# Patient Record
Sex: MALE | Race: WHITE | NOT HISPANIC OR LATINO | Employment: OTHER | URBAN - METROPOLITAN AREA
[De-identification: names, ages, dates, MRNs, and addresses within clinical notes are randomized per-mention and may not be internally consistent; named-entity substitution may affect disease eponyms.]

---

## 2017-09-20 PROBLEM — M77.32 CALCANEAL SPUR OF LEFT FOOT: Status: ACTIVE | Noted: 2017-09-20

## 2017-10-03 ENCOUNTER — OFFICE VISIT (OUTPATIENT)
Dept: UROLOGY | Facility: CLINIC | Age: 52
End: 2017-10-03
Payer: MEDICAID

## 2017-10-03 VITALS — HEIGHT: 66 IN | WEIGHT: 153 LBS | BODY MASS INDEX: 24.59 KG/M2

## 2017-10-03 DIAGNOSIS — N43.3 HYDROCELE, ACQUIRED: Primary | ICD-10-CM

## 2017-10-03 DIAGNOSIS — N50.819 ORCHALGIA: ICD-10-CM

## 2017-10-03 PROCEDURE — 99999 PR PBB SHADOW E&M-EST. PATIENT-LVL III: CPT | Mod: PBBFAC,,, | Performed by: UROLOGY

## 2017-10-03 PROCEDURE — 99213 OFFICE O/P EST LOW 20 MIN: CPT | Mod: PBBFAC,PN | Performed by: UROLOGY

## 2017-10-03 PROCEDURE — 99205 OFFICE O/P NEW HI 60 MIN: CPT | Mod: S$PBB,,, | Performed by: UROLOGY

## 2017-10-03 RX ORDER — LIDOCAINE HYDROCHLORIDE AND EPINEPHRINE 20; 10 MG/ML; UG/ML
1 INJECTION, SOLUTION INFILTRATION; PERINEURAL ONCE
Status: CANCELLED | OUTPATIENT
Start: 2017-10-03 | End: 2017-10-03

## 2017-10-03 RX ORDER — SODIUM CHLORIDE 9 MG/ML
INJECTION, SOLUTION INTRAVENOUS CONTINUOUS
Status: CANCELLED | OUTPATIENT
Start: 2017-10-03

## 2017-10-03 RX ORDER — CLONIDINE HYDROCHLORIDE 0.1 MG/1
0.1 TABLET ORAL DAILY
COMMUNITY
Start: 2017-09-07 | End: 2018-01-05

## 2017-10-03 RX ORDER — METFORMIN HYDROCHLORIDE 1000 MG/1
1000 TABLET ORAL 2 TIMES DAILY WITH MEALS
Status: ON HOLD | COMMUNITY
Start: 2017-09-19 | End: 2022-12-28 | Stop reason: CLARIF

## 2017-10-03 RX ORDER — BUPROPION HYDROCHLORIDE 150 MG/1
150 TABLET ORAL DAILY
COMMUNITY
Start: 2017-09-07 | End: 2018-01-05

## 2017-10-03 RX ORDER — INSULIN GLARGINE 100 [IU]/ML
22 INJECTION, SOLUTION SUBCUTANEOUS DAILY
COMMUNITY
Start: 2017-09-23 | End: 2022-01-12 | Stop reason: ALTCHOICE

## 2017-10-03 RX ORDER — CIPROFLOXACIN 2 MG/ML
400 INJECTION, SOLUTION INTRAVENOUS
Status: CANCELLED | OUTPATIENT
Start: 2017-10-03

## 2017-10-03 NOTE — PROGRESS NOTES
Subjective:       Patient ID: Ross Crystal is a 52 y.o. male.    Chief Complaint: Other (hydrocele)    51 yo WM with history  of right orchalgia x 4 weeks.. Here for treatment.      Testicle Pain   The patient's primary symptoms include testicular pain. The patient's pertinent negatives include no penile discharge, penile pain or scrotal swelling. This is a new problem. The current episode started 1 to 4 weeks ago. The problem occurs constantly. The problem has been gradually worsening. The pain is medium. Associated symptoms include frequency. Pertinent negatives include no abdominal pain, anorexia, chest pain, chills, constipation, coughing, diarrhea, discolored urine, dysuria, fever, flank pain, headaches, hematuria, hesitancy, joint pain, joint swelling, nausea, painful intercourse, rash, shortness of breath, sore throat, urgency, urinary retention or vomiting.     Review of Systems   Constitutional: Negative for activity change, appetite change, chills, diaphoresis, fatigue, fever and unexpected weight change.   HENT: Negative for congestion, hearing loss, sinus pressure, sore throat and trouble swallowing.    Eyes: Negative for photophobia, pain, discharge and visual disturbance.   Respiratory: Negative for apnea, cough and shortness of breath.    Cardiovascular: Negative for chest pain, palpitations and leg swelling.   Gastrointestinal: Negative for abdominal distention, abdominal pain, anal bleeding, anorexia, blood in stool, constipation, diarrhea, nausea, rectal pain and vomiting.   Endocrine: Negative for cold intolerance, heat intolerance, polydipsia, polyphagia and polyuria.   Genitourinary: Positive for frequency and testicular pain. Negative for decreased urine volume, difficulty urinating, discharge, dysuria, enuresis, flank pain, genital sores, hematuria, hesitancy, penile pain, penile swelling, scrotal swelling and urgency.   Musculoskeletal: Negative for arthralgias, back pain, joint pain and  myalgias.   Skin: Negative for color change, pallor, rash and wound.   Allergic/Immunologic: Negative for environmental allergies, food allergies and immunocompromised state.   Neurological: Negative for dizziness, seizures, weakness and headaches.   Hematological: Negative for adenopathy. Does not bruise/bleed easily.   Psychiatric/Behavioral: Negative.        Objective:      Physical Exam   Nursing note and vitals reviewed.  Constitutional: He is oriented to person, place, and time. He appears well-developed and well-nourished.   HENT:   Head: Normocephalic.   Nose: Nose normal.   Mouth/Throat: Oropharynx is clear and moist.   Eyes: Conjunctivae and EOM are normal. Pupils are equal, round, and reactive to light.   Neck: Normal range of motion. Neck supple.   Cardiovascular: Normal rate, regular rhythm, normal heart sounds and intact distal pulses.    Pulmonary/Chest: Effort normal and breath sounds normal.   Abdominal: Soft. Bowel sounds are normal.   Genitourinary: Penis normal. Right testis shows swelling. Right testis shows no mass and no tenderness. Right testis is descended. Cremasteric reflex is not absent on the right side. Left testis shows no mass, no swelling and no tenderness. Left testis is descended. Cremasteric reflex is not absent on the left side. Circumcised.         Musculoskeletal: Normal range of motion.   Neurological: He is alert and oriented to person, place, and time. He has normal reflexes.   Skin: Skin is warm and dry.     Psychiatric: He has a normal mood and affect. His behavior is normal. Judgment and thought content normal.       Assessment:       1. Hydrocele, acquired    2. Orchalgia        Plan:       Patient Instructions   Right Hydrocelectomy 10/9/17

## 2017-10-03 NOTE — LETTER
October 3, 2017      Ross Valverde MD  [address]  [address] [facility] [place] [address]           [facility]  [id number] [place] [address] [address]  [place] [address]  Phone: [phone]  Fax: [phone]          Patient: Ross Crystal   MR Number: [id number]   YOB: 1965   Date of Visit: 10/3/2017       Dear Dr. Ross Valverde:    Thank you for referring Ross Crystal to me for evaluation. Attached you will find relevant portions of my assessment and plan of care.    If you have questions, please do not hesitate to call me. I look forward to following Ross Crystal along with you.    Sincerely,    Zelalem Osman MD    Enclosure  CC:  No Recipients    If you would like to receive this communication electronically, please contact [email or web address] or [phone] to request more information on [facility] Link access.    For providers and/or their staff who would like to refer a patient to [facility], please contact us through our one-stop-shop provider referral line, [facility], at [phone].    If you feel you have received this communication in error or would no longer like to receive these types of communications, please e-mail [email or web address]

## 2017-10-04 ENCOUNTER — TELEPHONE (OUTPATIENT)
Dept: UROLOGY | Facility: CLINIC | Age: 52
End: 2017-10-04

## 2017-10-04 NOTE — TELEPHONE ENCOUNTER
----- Message from Yesenia Chamberlain sent at 10/4/2017 11:33 AM CDT -----  Contact: Daughter Leanne/[phone]  Patient's daughter requesting to speak with you concerning patient's upcoming surgery. Please call and advise.

## 2017-10-04 NOTE — TELEPHONE ENCOUNTER
----- Message from Yesenia Chamberlain sent at 10/4/2017  1:28 PM CDT -----  Contact: [phone]/Daughter Leanne  Patient's daughter states she is returning your call. Please advise.

## 2017-10-12 ENCOUNTER — TELEPHONE (OUTPATIENT)
Dept: UROLOGY | Facility: CLINIC | Age: 52
End: 2017-10-12

## 2017-10-12 NOTE — TELEPHONE ENCOUNTER
----- Message from Zelalem Osman MD sent at 10/12/2017  8:31 AM CDT -----  Tissue is a benign hydrocele

## 2017-11-01 ENCOUNTER — OFFICE VISIT (OUTPATIENT)
Dept: UROLOGY | Facility: CLINIC | Age: 52
End: 2017-11-01
Payer: MEDICAID

## 2017-11-01 VITALS — WEIGHT: 156 LBS | HEIGHT: 66 IN | BODY MASS INDEX: 25.07 KG/M2

## 2017-11-01 DIAGNOSIS — Z98.890 POSTOPERATIVE STATE: ICD-10-CM

## 2017-11-01 DIAGNOSIS — N43.3 HYDROCELE, ACQUIRED: Primary | ICD-10-CM

## 2017-11-01 DIAGNOSIS — N50.819 ORCHALGIA: ICD-10-CM

## 2017-11-01 PROCEDURE — 99212 OFFICE O/P EST SF 10 MIN: CPT | Mod: PBBFAC,PN | Performed by: UROLOGY

## 2017-11-01 PROCEDURE — 99999 PR PBB SHADOW E&M-EST. PATIENT-LVL II: CPT | Mod: PBBFAC,,, | Performed by: UROLOGY

## 2017-11-01 PROCEDURE — 99024 POSTOP FOLLOW-UP VISIT: CPT | Mod: ,,, | Performed by: UROLOGY

## 2017-11-01 RX ORDER — GABAPENTIN 300 MG/1
CAPSULE ORAL
COMMUNITY
Start: 2017-10-23 | End: 2018-02-22

## 2017-11-01 RX ORDER — SYRINGE,SAFETY WITH NEEDLE,1ML 25GX1"
SYRINGE (EA) MISCELLANEOUS
COMMUNITY
Start: 2017-10-31

## 2017-11-01 NOTE — PROGRESS NOTES
"Ross Crystal is a 52 y.o. male patient.   1. Hydrocele, acquired    2. Orchalgia    3. Postoperative state      Past Medical History:   Diagnosis Date    Diabetes mellitus     Diabetes mellitus type I     Hyperlipemia     Renal disorder     Urinary tract infection      Past Surgical History Pertinent Negatives:   Procedure Date Noted    CYSTOSCOPY 10/03/2017    PROSTATE SURGERY 10/03/2017    VASECTOMY 10/03/2017     Scheduled Meds:  Continuous Infusions:  PRN Meds:    Review of patient's allergies indicates:   Allergen Reactions    Bactrim [sulfamethoxazole-trimethoprim] Rash     There are no hospital problems to display for this patient.    Height 5' 6" (1.676 m), weight 70.8 kg (156 lb).    Subjective:   Diet: Adequate intake.  Patient reports no nausea or vomiting.    Activity level: Returning to normal.    Pain control: Well controlled.      Objective:  Vital signs (most recent): Height 5' 6" (1.676 m), weight 70.8 kg (156 lb).  General appearance: Comfortable, well-appearing, in no acute distress and not in pain.    Lungs:  Normal respiratory rate and normal effort.  Breath sounds normal.    Heart: Normal rate.  Regular rhythm.  S1 normal.    Chest: Symmetric chest wall expansion.    Abdomen: Abdomen is soft.    Bowel sounds:  Bowel sounds are normal.    Tenderness: There is no abdominal tenderness tenderness.    Wound:  Clean (Still with some edema to cord).  There is no drainage.    Extremities: There is normal range of motion.    Neurological: The patient is alert and oriented to person, place and time.  Right pupil is reactive.       Assessment & Plan       Zelalem Osman MD  11/1/2017  "

## 2018-04-10 ENCOUNTER — OFFICE VISIT (OUTPATIENT)
Dept: UROLOGY | Facility: CLINIC | Age: 53
End: 2018-04-10
Payer: MEDICAID

## 2018-04-10 VITALS
BODY MASS INDEX: 25.51 KG/M2 | WEIGHT: 158.75 LBS | OXYGEN SATURATION: 98 % | RESPIRATION RATE: 17 BRPM | DIASTOLIC BLOOD PRESSURE: 78 MMHG | HEART RATE: 76 BPM | HEIGHT: 66 IN | SYSTOLIC BLOOD PRESSURE: 110 MMHG

## 2018-04-10 DIAGNOSIS — R30.0 DYSURIA: ICD-10-CM

## 2018-04-10 DIAGNOSIS — M54.5 BILATERAL LOW BACK PAIN, UNSPECIFIED CHRONICITY, WITH SCIATICA PRESENCE UNSPECIFIED: ICD-10-CM

## 2018-04-10 DIAGNOSIS — R10.30 LOWER ABDOMINAL PAIN: ICD-10-CM

## 2018-04-10 DIAGNOSIS — N50.819 ORCHALGIA: Primary | ICD-10-CM

## 2018-04-10 PROCEDURE — 99999 PR PBB SHADOW E&M-EST. PATIENT-LVL V: CPT | Mod: PBBFAC,,, | Performed by: NURSE PRACTITIONER

## 2018-04-10 PROCEDURE — 87086 URINE CULTURE/COLONY COUNT: CPT

## 2018-04-10 PROCEDURE — 99214 OFFICE O/P EST MOD 30 MIN: CPT | Mod: S$PBB,,, | Performed by: NURSE PRACTITIONER

## 2018-04-10 PROCEDURE — 99215 OFFICE O/P EST HI 40 MIN: CPT | Mod: PBBFAC,PO | Performed by: NURSE PRACTITIONER

## 2018-04-10 RX ORDER — CALCIUM CITRATE/VITAMIN D3 200MG-6.25
TABLET ORAL
COMMUNITY
Start: 2018-03-16 | End: 2022-12-28

## 2018-04-10 RX ORDER — BUPROPION HYDROCHLORIDE 150 MG/1
TABLET ORAL
COMMUNITY
Start: 2018-02-28 | End: 2022-01-12 | Stop reason: ALTCHOICE

## 2018-04-10 RX ORDER — PRAVASTATIN SODIUM 40 MG/1
40 TABLET ORAL NIGHTLY
COMMUNITY
Start: 2018-03-21

## 2018-04-10 RX ORDER — IBUPROFEN 800 MG/1
800 TABLET ORAL 3 TIMES DAILY
Qty: 42 TABLET | Refills: 0 | Status: SHIPPED | OUTPATIENT
Start: 2018-04-10 | End: 2018-04-24

## 2018-04-10 RX ORDER — GLIPIZIDE 10 MG/1
TABLET ORAL
COMMUNITY
Start: 2018-03-21 | End: 2022-01-12 | Stop reason: ALTCHOICE

## 2018-04-10 NOTE — PROGRESS NOTES
Subjective:       Patient ID: Ross Crystal is a 52 y.o. male.    Chief Complaint: Testicle Pain    Patient is a 51 yo WM who is here today with c/o right orchalgia, lower abdominal pain, and low back pain. Patient had a hydrocelectomy (right scrotum) on 10/9/2017.       Testicle Pain   The patient's primary symptoms include scrotal swelling and testicular pain. The patient's pertinent negatives include no penile discharge or penile pain. This is a recurrent problem. Episode onset: 2 weeks ago after fishing. The problem occurs constantly. The problem has been unchanged. The pain is medium (7/10 pain with numbness). Associated symptoms include abdominal pain, dysuria and nausea. Pertinent negatives include no chest pain, chills, constipation, coughing, diarrhea, discolored urine, fever, flank pain, frequency, headaches, hematuria, hesitancy, shortness of breath, urgency, urinary retention or vomiting. The testicular pain affects the right testicle. There is swelling in the right testicle. The color of the testicles is blue. The symptoms are aggravated by heavy lifting. He has tried prescription analgesics for the symptoms. The treatment provided mild relief. He is not sexually active. There is no history of kidney stones, prostatitis or varicocele.     Review of Systems   Constitutional: Negative for chills and fever.   Respiratory: Negative for cough and shortness of breath.    Cardiovascular: Negative for chest pain.   Gastrointestinal: Positive for abdominal pain and nausea. Negative for constipation, diarrhea and vomiting.   Genitourinary: Positive for dysuria, scrotal swelling and testicular pain. Negative for decreased urine volume, difficulty urinating, discharge, flank pain, frequency, hematuria, hesitancy, penile pain, penile swelling and urgency.   Musculoskeletal: Positive for back pain.   Neurological: Negative for dizziness, weakness, light-headedness and headaches.   Psychiatric/Behavioral: Negative.         Objective:      Physical Exam   Constitutional: He is oriented to person, place, and time. He appears well-developed and well-nourished. No distress.   HENT:   Head: Normocephalic and atraumatic.   Eyes: EOM are normal. Pupils are equal, round, and reactive to light.   Neck: Normal range of motion.   Cardiovascular: Normal rate.    Pulmonary/Chest: Effort normal. No respiratory distress.   Abdominal: Soft. There is no tenderness.       Abdominal pain and pressure.   Genitourinary: Right testis shows swelling (mild) and tenderness. Right testis shows no mass. Left testis shows no mass, no swelling and no tenderness.   Genitourinary Comments: No abnormal skin color.   Musculoskeletal:        Back:    Low back pain.  No CVA tenderness.   Neurological: He is alert and oriented to person, place, and time. Coordination normal.   Skin: Skin is warm and dry.   Psychiatric: He has a normal mood and affect. His behavior is normal. Judgment and thought content normal.   Nursing note and vitals reviewed.      Assessment:       1. Orchalgia    2. Dysuria    3. Lower abdominal pain    4. Bilateral low back pain, unspecified chronicity, with sciatica presence unspecified        Plan:       Ross was seen today for testicle pain.    Diagnoses and all orders for this visit:    Orchalgia  -     ibuprofen (ADVIL,MOTRIN) 800 MG tablet; Take 1 tablet (800 mg total) by mouth 3 (three) times daily.  -     US Scrotum And Testicles; Future        -     Scrotal support    Dysuria  -     POCT URINE DIPSTICK WITHOUT MICROSCOPE  -     Urine culture    Lower abdominal pain  -     CT Renal Stone Study ABD Pelvis WO; Future    Bilateral low back pain, unspecified chronicity, with sciatica presence unspecified  -     CT Renal Stone Study ABD Pelvis WO; Future    Follow-up in 1 week    Y'Sabrina Guthrie NP

## 2018-04-10 NOTE — PATIENT INSTRUCTIONS
U/A  Urine culture  US scrotum and testicles  CT renal stone study  Start Ibuprofen 800 mg by mouth  Scrotal support  Follow-up in 1 week

## 2018-04-12 LAB — BACTERIA UR CULT: NO GROWTH

## 2018-04-18 ENCOUNTER — OFFICE VISIT (OUTPATIENT)
Dept: UROLOGY | Facility: CLINIC | Age: 53
End: 2018-04-18
Payer: MEDICAID

## 2018-04-18 VITALS — DIASTOLIC BLOOD PRESSURE: 67 MMHG | SYSTOLIC BLOOD PRESSURE: 98 MMHG | HEART RATE: 79 BPM

## 2018-04-18 DIAGNOSIS — N45.1 EPIDIDYMITIS, RIGHT: Primary | ICD-10-CM

## 2018-04-18 DIAGNOSIS — N50.819 ORCHALGIA: ICD-10-CM

## 2018-04-18 PROCEDURE — 99213 OFFICE O/P EST LOW 20 MIN: CPT | Mod: PBBFAC,PO | Performed by: NURSE PRACTITIONER

## 2018-04-18 PROCEDURE — 99999 PR PBB SHADOW E&M-EST. PATIENT-LVL III: CPT | Mod: PBBFAC,,, | Performed by: NURSE PRACTITIONER

## 2018-04-18 PROCEDURE — 99214 OFFICE O/P EST MOD 30 MIN: CPT | Mod: S$PBB,,, | Performed by: NURSE PRACTITIONER

## 2018-04-18 RX ORDER — CIPROFLOXACIN 500 MG/1
500 TABLET ORAL EVERY 12 HOURS
Qty: 28 TABLET | Refills: 0 | Status: SHIPPED | OUTPATIENT
Start: 2018-04-18 | End: 2018-05-02

## 2018-04-18 NOTE — PROGRESS NOTES
Subjective:       Patient ID: Ross Crystal is a 52 y.o. male.    Chief Complaint: No chief complaint on file.    Patient is a 52 WM who is here today for results and c/o right orchalgia. Patient was seen by me for same problem on 4/10/2018. CT renal stone study and US of scrotum and testicles were ordered at that time. CT showed no acute findings and US of scrotum and testicles showed right epididymal cyst vs spermatocele. Patient currently taking ibuprofen 800 mg for pain.       Other   This is a new (right orchalgia) problem. Episode onset: Approximately 4 weeks ago. The problem occurs intermittently. The problem has been waxing and waning. Associated symptoms include abdominal pain (pressure). Pertinent negatives include no change in bowel habit, chest pain, chills, coughing, fatigue, fever, headaches, myalgias, nausea, numbness, urinary symptoms, vertigo, visual change, vomiting or weakness. Exacerbated by: sitting. He has tried NSAIDs and oral narcotics for the symptoms. The treatment provided mild relief.     Review of Systems   Constitutional: Negative for chills, fatigue and fever.   HENT: Negative.    Eyes: Negative.    Respiratory: Negative for cough, chest tightness and shortness of breath.    Cardiovascular: Negative for chest pain, palpitations and leg swelling.   Gastrointestinal: Positive for abdominal pain (pressure). Negative for anal bleeding, change in bowel habit, diarrhea, nausea and vomiting.   Genitourinary: Negative for decreased urine volume, difficulty urinating, discharge, dysuria, flank pain, frequency, hematuria, penile pain, penile swelling, scrotal swelling, testicular pain and urgency.   Musculoskeletal: Negative for myalgias.   Neurological: Negative for dizziness, vertigo, facial asymmetry, speech difficulty, weakness, light-headedness, numbness and headaches.       Objective:      Physical Exam   Constitutional: He is oriented to person, place, and time. He appears well-developed  and well-nourished. No distress.   HENT:   Head: Normocephalic and atraumatic.   Eyes: EOM are normal. Pupils are equal, round, and reactive to light.   Neck: Normal range of motion.   Cardiovascular: Normal rate.    Pulmonary/Chest: Effort normal. No respiratory distress.   Abdominal: Soft. There is no tenderness.   Genitourinary: Penis normal. Right testis shows tenderness. Right testis shows no swelling. Left testis shows no mass, no swelling and no tenderness. Circumcised.   Musculoskeletal: Normal range of motion.   Neurological: He is alert and oriented to person, place, and time. Coordination normal.   Skin: Skin is warm and dry.   Psychiatric: He has a normal mood and affect. His behavior is normal. Judgment and thought content normal.   Nursing note and vitals reviewed.      Assessment:       1. Epididymitis, right    2. Orchalgia        Plan:       Diagnoses and all orders for this visit:    Epididymitis, right  -     ciprofloxacin HCl (CIPRO) 500 MG tablet; Take 1 tablet (500 mg total) by mouth every 12 (twelve) hours.    Orchalgia    Other orders  Continue ibuprofen 800 mg for right testicular pain.  Start Cipro 500 twice daily for 2 weeks.    Follow-up in 2 weeks.     Vonda Guthrie NP

## 2018-04-18 NOTE — PATIENT INSTRUCTIONS
Continue ibuprofen 800 mg for right testicular pain.  Start Cipro 500 twice daily for 2 weeks.  Follow-up in 2 weeks.

## 2019-08-07 DIAGNOSIS — M25.579 PAIN IN UNSPECIFIED ANKLE AND JOINTS OF UNSPECIFIED FOOT: Primary | ICD-10-CM

## 2021-05-18 ENCOUNTER — OFFICE VISIT (OUTPATIENT)
Dept: URGENT CARE | Facility: CLINIC | Age: 56
End: 2021-05-18
Payer: MEDICAID

## 2021-05-18 VITALS
OXYGEN SATURATION: 98 % | DIASTOLIC BLOOD PRESSURE: 85 MMHG | RESPIRATION RATE: 16 BRPM | TEMPERATURE: 98 F | WEIGHT: 164 LBS | HEART RATE: 74 BPM | SYSTOLIC BLOOD PRESSURE: 157 MMHG | BODY MASS INDEX: 26.36 KG/M2 | HEIGHT: 66 IN

## 2021-05-18 DIAGNOSIS — L23.1 ALLERGIC CONTACT DERMATITIS DUE TO ADHESIVES: ICD-10-CM

## 2021-05-18 DIAGNOSIS — L03.114 CELLULITIS OF LEFT UPPER EXTREMITY: Primary | ICD-10-CM

## 2021-05-18 PROCEDURE — 99203 OFFICE O/P NEW LOW 30 MIN: CPT | Mod: S$GLB,,, | Performed by: PHYSICIAN ASSISTANT

## 2021-05-18 PROCEDURE — 99203 PR OFFICE/OUTPT VISIT, NEW, LEVL III, 30-44 MIN: ICD-10-PCS | Mod: S$GLB,,, | Performed by: PHYSICIAN ASSISTANT

## 2021-05-18 RX ORDER — MIRTAZAPINE 15 MG/1
15 TABLET, FILM COATED ORAL NIGHTLY
COMMUNITY
Start: 2021-04-19 | End: 2022-01-12 | Stop reason: ALTCHOICE

## 2021-05-18 RX ORDER — CLINDAMYCIN HYDROCHLORIDE 300 MG/1
300 CAPSULE ORAL EVERY 6 HOURS
Qty: 20 CAPSULE | Refills: 0 | Status: SHIPPED | OUTPATIENT
Start: 2021-05-18 | End: 2021-05-23

## 2021-05-18 RX ORDER — TRIAMCINOLONE ACETONIDE 1 MG/G
CREAM TOPICAL 2 TIMES DAILY
Qty: 15 G | Refills: 0 | Status: SHIPPED | OUTPATIENT
Start: 2021-05-18 | End: 2022-01-12 | Stop reason: ALTCHOICE

## 2022-01-06 ENCOUNTER — OFFICE VISIT (OUTPATIENT)
Dept: GASTROENTEROLOGY | Facility: CLINIC | Age: 57
End: 2022-01-06
Payer: MEDICAID

## 2022-01-06 VITALS
SYSTOLIC BLOOD PRESSURE: 115 MMHG | BODY MASS INDEX: 25.86 KG/M2 | WEIGHT: 160.94 LBS | HEIGHT: 66 IN | DIASTOLIC BLOOD PRESSURE: 80 MMHG | OXYGEN SATURATION: 98 % | HEART RATE: 78 BPM

## 2022-01-06 DIAGNOSIS — Z01.818 PREOPERATIVE EXAMINATION: ICD-10-CM

## 2022-01-06 DIAGNOSIS — Z86.010 PERSONAL HISTORY OF COLONIC POLYPS: Primary | ICD-10-CM

## 2022-01-06 PROBLEM — F33.0 MILD RECURRENT MAJOR DEPRESSION: Status: ACTIVE | Noted: 2022-01-06

## 2022-01-06 PROBLEM — M54.50 LOW BACK PAIN: Status: ACTIVE | Noted: 2022-01-06

## 2022-01-06 PROBLEM — E11.9 TYPE 2 DIABETES MELLITUS WITHOUT COMPLICATION: Status: ACTIVE | Noted: 2022-01-06

## 2022-01-06 PROBLEM — N40.0 BENIGN PROSTATIC HYPERPLASIA WITHOUT URINARY OBSTRUCTION: Status: ACTIVE | Noted: 2022-01-06

## 2022-01-06 PROBLEM — F17.200 SMOKER: Status: ACTIVE | Noted: 2022-01-06

## 2022-01-06 PROBLEM — Z98.890 POSTOPERATIVE STATE: Status: RESOLVED | Noted: 2017-11-01 | Resolved: 2022-01-06

## 2022-01-06 PROBLEM — G89.4 CHRONIC PAIN SYNDROME: Status: ACTIVE | Noted: 2022-01-06

## 2022-01-06 PROBLEM — Z79.4 LONG TERM CURRENT USE OF INSULIN: Status: ACTIVE | Noted: 2022-01-06

## 2022-01-06 PROBLEM — E78.00 PURE HYPERCHOLESTEROLEMIA: Status: ACTIVE | Noted: 2022-01-06

## 2022-01-06 PROBLEM — Z86.0100 PERSONAL HISTORY OF COLONIC POLYPS: Status: ACTIVE | Noted: 2022-01-06

## 2022-01-06 PROBLEM — I10 ESSENTIAL HYPERTENSION: Status: ACTIVE | Noted: 2022-01-06

## 2022-01-06 PROBLEM — F11.20 OPIOID DEPENDENCE: Status: ACTIVE | Noted: 2022-01-06

## 2022-01-06 PROCEDURE — 99215 OFFICE O/P EST HI 40 MIN: CPT | Mod: PBBFAC,PO | Performed by: NURSE PRACTITIONER

## 2022-01-06 PROCEDURE — 1160F PR REVIEW ALL MEDS BY PRESCRIBER/CLIN PHARMACIST DOCUMENTED: ICD-10-PCS | Mod: CPTII,,, | Performed by: NURSE PRACTITIONER

## 2022-01-06 PROCEDURE — 99203 PR OFFICE/OUTPT VISIT, NEW, LEVL III, 30-44 MIN: ICD-10-PCS | Mod: S$PBB,,, | Performed by: NURSE PRACTITIONER

## 2022-01-06 PROCEDURE — 99999 PR PBB SHADOW E&M-EST. PATIENT-LVL V: CPT | Mod: PBBFAC,,, | Performed by: NURSE PRACTITIONER

## 2022-01-06 PROCEDURE — 1159F PR MEDICATION LIST DOCUMENTED IN MEDICAL RECORD: ICD-10-PCS | Mod: CPTII,,, | Performed by: NURSE PRACTITIONER

## 2022-01-06 PROCEDURE — 1160F RVW MEDS BY RX/DR IN RCRD: CPT | Mod: CPTII,,, | Performed by: NURSE PRACTITIONER

## 2022-01-06 PROCEDURE — 99999 PR PBB SHADOW E&M-EST. PATIENT-LVL V: ICD-10-PCS | Mod: PBBFAC,,, | Performed by: NURSE PRACTITIONER

## 2022-01-06 PROCEDURE — 3008F BODY MASS INDEX DOCD: CPT | Mod: CPTII,,, | Performed by: NURSE PRACTITIONER

## 2022-01-06 PROCEDURE — 99203 OFFICE O/P NEW LOW 30 MIN: CPT | Mod: S$PBB,,, | Performed by: NURSE PRACTITIONER

## 2022-01-06 PROCEDURE — 1159F MED LIST DOCD IN RCRD: CPT | Mod: CPTII,,, | Performed by: NURSE PRACTITIONER

## 2022-01-06 PROCEDURE — 3079F DIAST BP 80-89 MM HG: CPT | Mod: CPTII,,, | Performed by: NURSE PRACTITIONER

## 2022-01-06 PROCEDURE — 3079F PR MOST RECENT DIASTOLIC BLOOD PRESSURE 80-89 MM HG: ICD-10-PCS | Mod: CPTII,,, | Performed by: NURSE PRACTITIONER

## 2022-01-06 PROCEDURE — 3074F PR MOST RECENT SYSTOLIC BLOOD PRESSURE < 130 MM HG: ICD-10-PCS | Mod: CPTII,,, | Performed by: NURSE PRACTITIONER

## 2022-01-06 PROCEDURE — 3074F SYST BP LT 130 MM HG: CPT | Mod: CPTII,,, | Performed by: NURSE PRACTITIONER

## 2022-01-06 PROCEDURE — 3008F PR BODY MASS INDEX (BMI) DOCUMENTED: ICD-10-PCS | Mod: CPTII,,, | Performed by: NURSE PRACTITIONER

## 2022-01-06 RX ORDER — INSULIN ASPART 100 [IU]/ML
INJECTION, SOLUTION INTRAVENOUS; SUBCUTANEOUS
COMMUNITY
Start: 2021-12-28

## 2022-01-06 RX ORDER — IBUPROFEN 800 MG/1
800 TABLET ORAL 3 TIMES DAILY
Status: ON HOLD | COMMUNITY
Start: 2021-12-28 | End: 2022-12-28 | Stop reason: CLARIF

## 2022-01-06 RX ORDER — SODIUM, POTASSIUM,MAG SULFATES 17.5-3.13G
1 SOLUTION, RECONSTITUTED, ORAL ORAL DAILY
Qty: 1 KIT | Refills: 0 | Status: SHIPPED | OUTPATIENT
Start: 2022-01-06 | End: 2022-01-08

## 2022-01-06 RX ORDER — DULOXETIN HYDROCHLORIDE 60 MG/1
60 CAPSULE, DELAYED RELEASE ORAL DAILY
COMMUNITY
Start: 2021-08-17 | End: 2022-01-12 | Stop reason: ALTCHOICE

## 2022-01-06 RX ORDER — DULOXETIN HYDROCHLORIDE 30 MG/1
30 CAPSULE, DELAYED RELEASE ORAL DAILY
COMMUNITY
Start: 2021-08-20 | End: 2022-01-12 | Stop reason: ALTCHOICE

## 2022-01-06 RX ORDER — INSULIN DETEMIR 100 [IU]/ML
INJECTION, SOLUTION SUBCUTANEOUS
COMMUNITY
Start: 2021-11-18 | End: 2022-12-28 | Stop reason: DRUGHIGH

## 2022-01-06 RX ORDER — TAMSULOSIN HYDROCHLORIDE 0.4 MG/1
0.4 CAPSULE ORAL DAILY
COMMUNITY
Start: 2021-12-28

## 2022-01-06 RX ORDER — LIDOCAINE AND PRILOCAINE 25; 25 MG/G; MG/G
CREAM TOPICAL
Status: ON HOLD | COMMUNITY
Start: 2021-11-18 | End: 2022-12-28 | Stop reason: CLARIF

## 2022-01-06 RX ORDER — PREGABALIN 50 MG/1
50 CAPSULE ORAL 3 TIMES DAILY
COMMUNITY
Start: 2021-09-21 | End: 2022-01-12 | Stop reason: ALTCHOICE

## 2022-01-06 NOTE — PROGRESS NOTES
Subjective:       Patient ID: Ross Crystal is a 56 y.o. male.    Chief Complaint: Colon Cancer Screening    57 y/o male with multiple diagnoses as listed in problem list and medical history presents to clinic to schedule colonoscopy. Last colonoscopy in 2016 with multiple polyps. Patient denies any concerning symptoms today. No chest or abdominal pain, n/v, blood in stool, melena, weight loss, fatigue or night sweats.     Old records from [facility] reviewed and summarized, significant for:  -1/25/2016 colonoscopy revealed polyp(s) #1, 4 mm in size, located in the cecum removed by snare cautery and retrieved for pathology;  #2, 4 mm in size, located in the cecum removed by snare cautery and retrieved for pathology; #3, 6 mm in size, located in the splenic flexure removed by snare cautery and retrieved for pathology; #4, 10 mm in size, located in the rectosigmoid removed by snare cautery and retrieved for pathology. Otherwise normal colonoscopy to the cecum.      Past Medical History:   Diagnosis Date    Diabetes mellitus     Diabetes mellitus type I     Hyperlipemia     Renal disorder     Urinary tract infection        Past Surgical History:   Procedure Laterality Date    CARPAL TUNNEL RELEASE  25 YEARS AGO    COLONOSCOPY      COLONOSCOPY N/A 1/25/2016    Procedure: COLONOSCOPY;  Surgeon: Luis Bogran-Reyes, MD;  Location: [facility];  Service: Endoscopy;  Laterality: N/A;    ESOPHAGOGASTRODUODENOSCOPY      HERNIA REPAIR      HERNIA REPAIR         Family History   Problem Relation Age of Onset    Cancer Father     Alzheimer's disease Mother     Prostate cancer Neg Hx     Kidney disease Neg Hx        Social History     Socioeconomic History    Marital status: Single    Years of education: 9th   Tobacco Use    Smoking status: Current Every Day Smoker     Packs/day: 2.00     Years: 25.00     Pack years: 50.00    Smokeless tobacco: Never Used   Substance and Sexual Activity    Alcohol use: No      "Alcohol/week: 0.0 standard drinks    Drug use: No     Comment: 30YEARS AGO, PATIENT ON PRESCRIBED PAIN MEDICATION.     Sexual activity: Not Currently       Review of Systems   Constitutional: Negative for appetite change, fatigue, fever and unexpected weight change.   HENT: Negative for trouble swallowing.    Eyes: Negative for visual disturbance.   Respiratory: Negative for shortness of breath.    Cardiovascular: Negative for chest pain and palpitations.   Gastrointestinal: Negative for abdominal pain, blood in stool and constipation.   Neurological: Negative for weakness and headaches.   Hematological: Negative for adenopathy. Does not bruise/bleed easily.   Psychiatric/Behavioral: Negative for dysphoric mood.         Objective:     Vitals:    01/06/22 0837   BP: 115/80   Pulse: 78   SpO2: 98%   Weight: 73 kg (160 lb 15 oz)   Height: 5' 6" (1.676 m)          Physical Exam  Constitutional:       General: He is not in acute distress.     Appearance: Normal appearance. He is well-developed and well-nourished. He is not ill-appearing.   HENT:      Head: Normocephalic.   Eyes:      Conjunctiva/sclera: Conjunctivae normal.      Pupils: Pupils are equal, round, and reactive to light.   Pulmonary:      Effort: Pulmonary effort is normal. No respiratory distress.   Musculoskeletal:         General: No swelling. Normal range of motion.      Cervical back: Normal range of motion.   Skin:     General: Skin is warm and dry.   Neurological:      Mental Status: He is alert and oriented to person, place, and time.   Psychiatric:         Mood and Affect: Mood and affect and mood normal.         Behavior: Behavior normal.               Assessment:         ICD-10-CM ICD-9-CM   1. Personal history of colonic polyps  Z86.010 V12.72   2. Preoperative examination  Z01.818 V72.84       Plan:       Personal history of colonic polyps  -     SUPREP BOWEL PREP KIT 17.5-3.13-1.6 gram SolR; Take 177 mLs by mouth once daily. for 2 days  " Dispense: 1 kit; Refill: 0  -     Case Request Endoscopy: COLONOSCOPY    Preoperative examination  -     COVID-19 Routine Screening; Future; Expected date: 2022    Follow up if symptoms worsen or fail to improve.     Patient's Medications   New Prescriptions    SUPREP BOWEL PREP KIT 17.5-3.13-1.6 GRAM SOLR    Take 177 mLs by mouth once daily. for 2 days   Previous Medications    ASPIRIN (ECOTRIN) 81 MG EC TABLET    Take 81 mg by mouth once daily.    BUPROPION (WELLBUTRIN XL) 150 MG TB24 TABLET        DICLOFENAC (VOLTAREN) 75 MG EC TABLET    Take 1 tablet (75 mg total) by mouth 2 (two) times daily.    DOXYCYCLINE (MONODOX) 100 MG CAPSULE    Take 1 capsule (100 mg total) by mouth 2 (two) times daily.    DULOXETINE (CYMBALTA) 30 MG CAPSULE    Take 30 mg by mouth once daily.    DULOXETINE (CYMBALTA) 60 MG CAPSULE    Take 60 mg by mouth once daily.    FISH OIL-OMEGA-3 FATTY ACIDS 300-1,000 MG CAPSULE    Take 2 g by mouth once daily.    GLIPIZIDE (GLUCOTROL) 10 MG TABLET        HYDROCODONE-ACETAMINOPHEN 10-325MG (NORCO)  MG TAB    Take by mouth.    IBUPROFEN (ADVIL,MOTRIN) 800 MG TABLET    Take 800 mg by mouth 3 (three) times daily.    INSULIN SYRINGE-NEEDLE U-100 1 ML 31 GAUGE X 5/16 SYRG        LANTUS 100 UNIT/ML INJECTION    Inject 22 Units into the skin once daily.     LEVEMIR U-100 INSULIN 100 UNIT/ML INJECTION    SMARTSI Unit(s) SUB-Q Twice Daily    LIDOCAINE-PRILOCAINE (EMLA) CREAM    Apply topically.    METFORMIN (GLUCOPHAGE) 1000 MG TABLET    1,000 mg 2 (two) times daily with meals.     MIRTAZAPINE (REMERON) 15 MG TABLET    Take 15 mg by mouth nightly.    NOVOLOG FLEXPEN U-100 INSULIN 100 UNIT/ML (3 ML) INPN PEN    SMARTSI-12 Unit(s) SUB-Q As Directed    PRAVASTATIN (PRAVACHOL) 40 MG TABLET        PREGABALIN (LYRICA) 50 MG CAPSULE    Take 50 mg by mouth 3 (three) times daily.    TAMSULOSIN (FLOMAX) 0.4 MG CAP    Take 1 capsule by mouth once daily.    TRIAMCINOLONE ACETONIDE 0.1% (KENALOG) 0.1 %  CREAM    Apply topically 2 (two) times daily. for 5 days    TRUE METRIX GLUCOSE TEST STRIP STRP       Modified Medications    No medications on file   Discontinued Medications    No medications on file

## 2022-04-28 ENCOUNTER — PATIENT OUTREACH (OUTPATIENT)
Dept: EMERGENCY MEDICINE | Facility: HOSPITAL | Age: 57
End: 2022-04-28
Payer: MEDICAID

## 2022-04-28 NOTE — PROGRESS NOTES
Gopal Mckeon  ED Navigator  Emergency Department    [place] [facility] [place]  Role: Community Health Worker    Date: 04/28/2022  Patient Name: Ross Crystal  MRN: [id number]  PCP: Ross Valverde MD    Assessment:     Ross Crystal is a 56 y.o. male who has presented to ED for abscess. Patient has visited the ED 3 times in the past 3 months. Patient did contact PCP.     ED Navigator Initial Assessment    ED Navigator Enrollment Documentation  Consent to Services  Does patient consent to completing the assessment?: Yes  Contact  Method of Initial Contact: Phone  Transportation  Does the patient have issues with Transportation?: No  Does the patient have transportation to and from healthcare appointments?: Yes  Insurance Coverage  Do you have coverage/adequate coverage?: Yes  Type/kind of coverage: MEDICAID HEALTHY BLUE  Is patient able to afford co-pays/deductibles?: Yes  Is patient able to afford HME or supplies?: Yes  Does patient have an established [facility] PCP?: Yes  Able to access?: Yes  Does the patient have a lack of adequate coverage?: No  Specialist Appointment  Did the patient come to the ED to see a specialist?: No  Does the patient have a pending specialist referral?: No  Does the patient have a specialist appointment made?: No  PCP Follow Up Appointment  Has the patient had an appointment with a primary care provider in the past year?: Yes  Approximate date: 2/28/22  Provider: Ross Valverde MD  Does the patient have a follow up appontment with a PCP?: Yes  Upcoming appointment date: 5/3/22  Provider: Ross Valverde MD  When was the last time you saw your PCP?: 2/28/22  Medications  Is patient able to afford medication?: Yes  Is patient unable to get medication due to lack of transportation?: No  Psychological  Does the patient have psycho-social concerns?: No  Food  Does the patient have concerns about food?: No  Communication/Education  Does the patient have limited English proficiency/English not  primary language?: No  Does patient have low literacy and/or low health literacy?: No  Does patient have concerns with care?: No  Does patient have dissatisfaction with care?: No  Other Financial Concerns  Does the patient have immediate financial distress?: No  Does the patient have general financial concerns?: No  Other Social Barriers/Concerns  Does the patient have any additional barriers or concerns?: Work  Primary Barrier  Barriers identified: Structural barrier (service availability, waiting times, etc.)  Root Cause of ED Utilization: Lack of Access to Primary Care  Plan to address Lack of Access to Primary Care: Provided patient with information about the [facility] in their area, Provided [facility] PCP assistance line [phone], Provided information for [facility] ([facility], its learning, etc.), Provided information for [facility] 24/7 Nurse Triage line [phone] or 1-866-[facility] ([phone]), Provided information on COVID-[employer] Line resources and hotline ([phone])  Next steps: Provided Education  Was education/educational materials provided surrounding PCP services/creating a medical home?: No    Was education/educational materials provided surrounding low cost, healthy foods?: No    Was education/educational materials provided surrounding other items? If so, use comment to explain.: No    Plan: Provided information for [facility] 24/7 Nurse triage line, [phone] or [facility] ([phone])  Expected Date of Follow Up 1: 5/13/22  Additional Documentation: Spoke with patient that presented to the ED for an abscess. Patient stated he was doing fine. Patient was asked if he had a PCP he stated he does and was last seen about 2 months ago and he has an appointment on next week. Patient denied needing any other assistance at this time.         Social History     Socioeconomic History     Marital status: Single    Years of education: 9th   Tobacco Use    Smoking status: Current Every Day Smoker     Packs/day: 2.00     Years: 25.00     Pack years: 50.00    Smokeless tobacco: Never Used   Substance and Sexual Activity    Alcohol use: No     Alcohol/week: 0.0 standard drinks    Drug use: No     Comment: 30YEARS AGO, PATIENT ON PRESCRIBED PAIN MEDICATION.     Sexual activity: Not Currently     Social Determinants of Health     Financial Resource Strain: Low Risk     Difficulty of Paying Living Expenses: Not very hard   Food Insecurity: No Food Insecurity    Worried About Running Out of Food in the Last Year: Never true    Ran Out of Food in the Last Year: Never true   Transportation Needs: No Transportation Needs    Lack of Transportation (Medical): No    Lack of Transportation (Non-Medical): No   Physical Activity: Inactive    Days of Exercise per Week: 0 days    Minutes of Exercise per Session: 0 min   Stress: No Stress Concern Present    Feeling of Stress : Only a little   Social Connections: Socially Isolated    Frequency of Communication with Friends and Family: More than three times a week    Frequency of Social Gatherings with Friends and Family: More than three times a week    Attends Gnosticism Services: Never    Active Member of Clubs or Organizations: No    Attends Club or Organization Meetings: Never    Marital Status: Never    Housing Stability: Unknown    Unable to Pay for Housing in the Last Year: No    Unstable Housing in the Last Year: No       Plan:     Spoke with patient that presented to the ED for an abscess. Patient stated he was doing fine. Patient was asked if he had a PCP he stated he does and was last seen about 2 months ago and he has an appointment on next week. Patient denied needing any other assistance at this time.      Appointment made with: Ross Valverde MD

## 2022-05-16 ENCOUNTER — PATIENT OUTREACH (OUTPATIENT)
Dept: EMERGENCY MEDICINE | Facility: HOSPITAL | Age: 57
End: 2022-05-16
Payer: MEDICAID

## 2022-05-16 NOTE — PROGRESS NOTES
Spoke with patient and he stated he was doing fine, with the exception of some pain in his hands. Patient stated he was having problems getting medicine he had been seeing advertised on television but his insurance would not pay for it. Patient denied needing any other assistance at this time.

## 2022-05-30 ENCOUNTER — PATIENT OUTREACH (OUTPATIENT)
Dept: EMERGENCY MEDICINE | Facility: HOSPITAL | Age: 57
End: 2022-05-30
Payer: MEDICAID

## 2022-05-30 NOTE — PROGRESS NOTES
Spoke with patient and he stated he was doing ok. Patient was asked if he had been to see his PCP he stated he had been but he is currently looking for a new PCP and trying to decide which provider to go with. Patient denied needing any other assistance at this time.

## 2022-06-20 ENCOUNTER — PATIENT OUTREACH (OUTPATIENT)
Dept: EMERGENCY MEDICINE | Facility: HOSPITAL | Age: 57
End: 2022-06-20
Payer: MEDICAID

## 2022-06-20 NOTE — PROGRESS NOTES
Spoke with patient and he stated he was doing fine. Patient was asked if he had been to see his PCP he stated he has an appointment next month. Patient denied needing any other assistance at this time.

## 2022-12-28 ENCOUNTER — HOSPITAL ENCOUNTER (INPATIENT)
Facility: HOSPITAL | Age: 57
LOS: 3 days | Discharge: HOME | DRG: 513 | End: 2022-12-31
Attending: FAMILY MEDICINE | Admitting: FAMILY MEDICINE
Payer: MEDICAID

## 2022-12-28 DIAGNOSIS — L03.012 CELLULITIS OF LEFT LITTLE FINGER: Primary | ICD-10-CM

## 2022-12-28 DIAGNOSIS — M79.5 FOREIGN BODY (FB) IN SOFT TISSUE: ICD-10-CM

## 2022-12-28 PROBLEM — L03.114 CELLULITIS OF LEFT UPPER EXTREMITY: Status: ACTIVE | Noted: 2022-12-28

## 2022-12-28 PROBLEM — D72.829 LEUCOCYTOSIS: Status: ACTIVE | Noted: 2022-12-28

## 2022-12-28 PROBLEM — Z72.0 TOBACCO ABUSE: Status: ACTIVE | Noted: 2022-01-06

## 2022-12-28 LAB
ESTIMATED AVG GLUCOSE: 212 MG/DL (ref 68–131)
HBA1C MFR BLD: 9 % (ref 4–5.6)
POCT GLUCOSE: 359 MG/DL (ref 70–110)

## 2022-12-28 PROCEDURE — 63600175 PHARM REV CODE 636 W HCPCS: Performed by: FAMILY MEDICINE

## 2022-12-28 PROCEDURE — 83036 HEMOGLOBIN GLYCOSYLATED A1C: CPT | Performed by: FAMILY MEDICINE

## 2022-12-28 PROCEDURE — 63600175 PHARM REV CODE 636 W HCPCS: Performed by: ORTHOPAEDIC SURGERY

## 2022-12-28 PROCEDURE — 36415 COLL VENOUS BLD VENIPUNCTURE: CPT | Performed by: FAMILY MEDICINE

## 2022-12-28 PROCEDURE — 11000001 HC ACUTE MED/SURG PRIVATE ROOM

## 2022-12-28 PROCEDURE — 25000003 PHARM REV CODE 250: Performed by: FAMILY MEDICINE

## 2022-12-28 PROCEDURE — 87040 BLOOD CULTURE FOR BACTERIA: CPT | Performed by: FAMILY MEDICINE

## 2022-12-28 RX ORDER — VANCOMYCIN HCL IN 5 % DEXTROSE 1G/250ML
1000 PLASTIC BAG, INJECTION (ML) INTRAVENOUS
Status: DISCONTINUED | OUTPATIENT
Start: 2022-12-29 | End: 2022-12-29

## 2022-12-28 RX ORDER — IBUPROFEN 200 MG
24 TABLET ORAL
Status: DISCONTINUED | OUTPATIENT
Start: 2022-12-28 | End: 2022-12-31 | Stop reason: HOSPADM

## 2022-12-28 RX ORDER — KETOROLAC TROMETHAMINE 30 MG/ML
30 INJECTION, SOLUTION INTRAMUSCULAR; INTRAVENOUS ONCE
Status: COMPLETED | OUTPATIENT
Start: 2022-12-29 | End: 2022-12-29

## 2022-12-28 RX ORDER — SODIUM CHLORIDE 9 MG/ML
INJECTION, SOLUTION INTRAVENOUS
Status: DISCONTINUED | OUTPATIENT
Start: 2022-12-28 | End: 2022-12-31 | Stop reason: HOSPADM

## 2022-12-28 RX ORDER — KETOROLAC TROMETHAMINE 30 MG/ML
30 INJECTION, SOLUTION INTRAMUSCULAR; INTRAVENOUS ONCE
Status: COMPLETED | OUTPATIENT
Start: 2022-12-28 | End: 2022-12-28

## 2022-12-28 RX ORDER — INSULIN ASPART 100 [IU]/ML
0-5 INJECTION, SOLUTION INTRAVENOUS; SUBCUTANEOUS
Status: DISCONTINUED | OUTPATIENT
Start: 2022-12-28 | End: 2022-12-31 | Stop reason: HOSPADM

## 2022-12-28 RX ORDER — MUPIROCIN 20 MG/G
OINTMENT TOPICAL 2 TIMES DAILY
Status: DISCONTINUED | OUTPATIENT
Start: 2022-12-28 | End: 2022-12-31 | Stop reason: HOSPADM

## 2022-12-28 RX ORDER — HEPARIN SODIUM 5000 [USP'U]/ML
5000 INJECTION, SOLUTION INTRAVENOUS; SUBCUTANEOUS EVERY 8 HOURS
Status: DISCONTINUED | OUTPATIENT
Start: 2022-12-28 | End: 2022-12-31 | Stop reason: HOSPADM

## 2022-12-28 RX ORDER — SODIUM CHLORIDE 0.9 % (FLUSH) 0.9 %
10 SYRINGE (ML) INJECTION EVERY 12 HOURS PRN
Status: DISCONTINUED | OUTPATIENT
Start: 2022-12-28 | End: 2022-12-31 | Stop reason: HOSPADM

## 2022-12-28 RX ORDER — CEFEPIME HYDROCHLORIDE 1 G/50ML
1 INJECTION, SOLUTION INTRAVENOUS
Status: DISCONTINUED | OUTPATIENT
Start: 2022-12-28 | End: 2022-12-31 | Stop reason: HOSPADM

## 2022-12-28 RX ORDER — PRAVASTATIN SODIUM 40 MG/1
40 TABLET ORAL NIGHTLY
Status: DISCONTINUED | OUTPATIENT
Start: 2022-12-28 | End: 2022-12-31 | Stop reason: HOSPADM

## 2022-12-28 RX ORDER — INSULIN ASPART 100 [IU]/ML
3 INJECTION, SOLUTION INTRAVENOUS; SUBCUTANEOUS
Status: DISCONTINUED | OUTPATIENT
Start: 2022-12-29 | End: 2022-12-30

## 2022-12-28 RX ORDER — GLUCAGON 1 MG
1 KIT INJECTION
Status: DISCONTINUED | OUTPATIENT
Start: 2022-12-28 | End: 2022-12-31 | Stop reason: HOSPADM

## 2022-12-28 RX ORDER — HYDROCODONE BITARTRATE AND ACETAMINOPHEN 10; 325 MG/1; MG/1
1 TABLET ORAL EVERY 6 HOURS PRN
Status: DISCONTINUED | OUTPATIENT
Start: 2022-12-28 | End: 2022-12-31 | Stop reason: HOSPADM

## 2022-12-28 RX ORDER — VANCOMYCIN/0.9 % SOD CHLORIDE 1 G/100 ML
1000 PLASTIC BAG, INJECTION (ML) INTRAVENOUS
Status: DISCONTINUED | OUTPATIENT
Start: 2022-12-29 | End: 2022-12-28

## 2022-12-28 RX ORDER — IBUPROFEN 200 MG
16 TABLET ORAL
Status: DISCONTINUED | OUTPATIENT
Start: 2022-12-28 | End: 2022-12-31 | Stop reason: HOSPADM

## 2022-12-28 RX ORDER — TAMSULOSIN HYDROCHLORIDE 0.4 MG/1
0.4 CAPSULE ORAL DAILY
Status: DISCONTINUED | OUTPATIENT
Start: 2022-12-29 | End: 2022-12-31 | Stop reason: HOSPADM

## 2022-12-28 RX ADMIN — HYDROCODONE BITARTRATE AND ACETAMINOPHEN 1 TABLET: 10; 325 TABLET ORAL at 11:12

## 2022-12-28 RX ADMIN — KETOROLAC TROMETHAMINE 30 MG: 30 INJECTION, SOLUTION INTRAMUSCULAR at 06:12

## 2022-12-28 RX ADMIN — HYDROCODONE BITARTRATE AND ACETAMINOPHEN 1 TABLET: 10; 325 TABLET ORAL at 05:12

## 2022-12-28 RX ADMIN — INSULIN DETEMIR 15 UNITS: 100 INJECTION, SOLUTION SUBCUTANEOUS at 09:12

## 2022-12-28 RX ADMIN — MUPIROCIN: 20 OINTMENT TOPICAL at 09:12

## 2022-12-28 RX ADMIN — PRAVASTATIN SODIUM 40 MG: 40 TABLET ORAL at 09:12

## 2022-12-28 RX ADMIN — INSULIN ASPART 2 UNITS: 100 INJECTION, SOLUTION INTRAVENOUS; SUBCUTANEOUS at 09:12

## 2022-12-28 RX ADMIN — INSULIN ASPART 2 UNITS: 100 INJECTION, SOLUTION INTRAVENOUS; SUBCUTANEOUS at 05:12

## 2022-12-28 RX ADMIN — SODIUM CHLORIDE: 0.9 INJECTION, SOLUTION INTRAVENOUS at 05:12

## 2022-12-28 RX ADMIN — HEPARIN SODIUM 5000 UNITS: 5000 INJECTION INTRAVENOUS; SUBCUTANEOUS at 09:12

## 2022-12-28 RX ADMIN — CEFEPIME HYDROCHLORIDE 1 G: 1 INJECTION, SOLUTION INTRAVENOUS at 05:12

## 2022-12-28 NOTE — PROGRESS NOTES
Pt arrived to unit. Introduced self as VN for this shift. Admission questions completed by VN. Educated pt on safety precautions, and VN's role in pt care. Opportunity given for pt's questions. All questions answered.    12/28/22 1528   Admission   Initial VN Admission Questions Complete   Communication Issues? None   Shift   Virtual Nurse - Patient Verbalized Approval Of Camera Use   Type of Frequent Check   Type Other (see comments)  (Admission)   Safety/Activity   Patient Rounds bed in low position;visualized patient;call light in patient/parent reach;placement of personal items at bedside   Safety Promotion/Fall Prevention assistive device/personal item within reach;Fall Risk reviewed with patient/family;medications reviewed;side rails raised x 2;instructed to call staff for mobility   Positioning   Body Position position changed independently   Pain/Comfort/Sleep   Preferred Pain Scale number (Numeric Rating Pain Scale)   Comfort/Acceptable Pain Level 2   Pain Rating (0-10): Rest 2

## 2022-12-28 NOTE — SUBJECTIVE & OBJECTIVE
Past Medical History:   Diagnosis Date    Benign paroxysmal vertigo, bilateral     Diabetes mellitus     Diabetes mellitus type I     Hyperlipemia     Renal disorder     Urinary tract infection        Past Surgical History:   Procedure Laterality Date    CARPAL TUNNEL RELEASE  25 YEARS AGO    COLONOSCOPY      COLONOSCOPY N/A 2016    Procedure: COLONOSCOPY;  Surgeon: Luis Bogran-Reyes, MD;  Location: [facility];  Service: Endoscopy;  Laterality: N/A;    ESOPHAGOGASTRODUODENOSCOPY      HERNIA REPAIR      HERNIA REPAIR         Review of patient's allergies indicates:   Allergen Reactions    Bactrim [sulfamethoxazole-trimethoprim] Rash       Current Facility-Administered Medications on File Prior to Encounter   Medication    [COMPLETED] cefepime in dextrose 5 % IVPB 2 g    [COMPLETED] ciprofloxacin (CIPRO)400mg/200ml D5W IVPB 400 mg    [COMPLETED] ciprofloxacin (CIPRO)400mg/200ml D5W IVPB 400 mg    [COMPLETED] doxycycline capsule 100 mg    [COMPLETED] doxycycline tablet 100 mg    [COMPLETED] HYDROcodone-acetaminophen 5-325 mg per tablet 2 tablet    [COMPLETED] ketorolac tablet 10 mg    [COMPLETED] LIDOcaine HCL 10 mg/ml (1%) injection 1 mL    [COMPLETED] Tdap (BOOSTRIX) vaccine injection 0.5 mL    [] vancomycin 2 g in 0.9% sodium chloride 500 mL IVPB    [COMPLETED] vancomycin in dextrose 5 % 1 gram/250 mL IVPB    [DISCONTINUED] cefepime in dextrose 5 % IVPB 2 g    [DISCONTINUED] cefepime in dextrose 5 % IVPB 2 g    [DISCONTINUED] dextrose 10% bolus 125 mL 125 mL    [DISCONTINUED] dextrose 10% bolus 250 mL 250 mL    [DISCONTINUED] doxycycline tablet 100 mg    [DISCONTINUED] glucagon (human recombinant) injection 1 mg    [DISCONTINUED] glucose chewable tablet 16 g    [DISCONTINUED] glucose chewable tablet 24 g    [DISCONTINUED] HYDROcodone-acetaminophen  mg per tablet 1 tablet    [DISCONTINUED] HYDROcodone-acetaminophen 5-325 mg per tablet 2 tablet    [DISCONTINUED] insulin aspart U-100 pen 1-10 Units     "[DISCONTINUED] insulin aspart U-100 pen 5 Units    [DISCONTINUED] insulin detemir U-100 pen 25 Units    [DISCONTINUED] pravastatin tablet 40 mg    [DISCONTINUED] tamsulosin 24 hr capsule 0.4 mg    [DISCONTINUED] vancomycin - pharmacy to dose    [DISCONTINUED] vancomycin in dextrose 5 % 1 gram/250 mL IVPB 1,000 mg     Current Outpatient Medications on File Prior to Encounter   Medication Sig    aspirin (ECOTRIN) 81 MG EC tablet Take 81 mg by mouth once daily.    fish oil-omega-3 fatty acids 300-1,000 mg capsule Take 1 capsule by mouth 2 (two) times a day.    hydrocodone-acetaminophen 10-325mg (NORCO)  mg Tab Take 1 tablet by mouth 2 (two) times a day.    LEVEMIR FLEXTOUCH U-100 INSULN 100 unit/mL (3 mL) InPn pen Inject 35 Units into the skin 2 (two) times daily.    meclizine (ANTIVERT) 50 MG tablet Take 50 mg by mouth 2 (two) times daily.    NOVOLOG FLEXPEN U-100 INSULIN 100 unit/mL (3 mL) InPn pen SMARTSI-12 Unit(s) SUB-Q As Directed    pravastatin (PRAVACHOL) 40 MG tablet Take 40 mg by mouth every evening.    tamsulosin (FLOMAX) 0.4 mg Cap Take 0.4 mg by mouth once daily.    BD ULTRA-FINE JENNY PEN NEEDLE 32 gauge x 5/32" Ndle 4 (four) times daily.    DEXCOM G6  Misc     DEXCOM G6 SENSOR Sadaf SMARTSI Each Topical Every 10 Days    DEXCOM G6 TRANSMITTER Sadaf     insulin syringe-needle U-100 1 mL 31 gauge x 5/16 Syrg     [DISCONTINUED] BAQSIMI 3 mg/actuation Spry SMARTSI Spray(s) In Nostril    [DISCONTINUED] BINAXNOW COVID-19 AG SELF TEST Kit Use as Directed on the Package    [DISCONTINUED] flash glucose scanning reader (FREESTYLE JULIO 10 DAY READER) Misc as directed    [DISCONTINUED] FREESTYLE JULIO 14 DAY SENSOR Kit use as directed    [DISCONTINUED] ibuprofen (ADVIL,MOTRIN) 800 MG tablet Take 800 mg by mouth 3 (three) times daily.    [DISCONTINUED] LEVEMIR U-100 INSULIN 100 unit/mL injection SMARTSI Unit(s) SUB-Q Twice Daily    [DISCONTINUED] LIDOcaine-prilocaine (EMLA) cream Apply " topically.    [DISCONTINUED] metformin (GLUCOPHAGE) 1000 MG tablet 1,000 mg 2 (two) times daily with meals.     [DISCONTINUED] mirtazapine (REMERON) 30 MG tablet Take 30 mg by mouth every evening.    [DISCONTINUED] TRUE METRIX GLUCOSE TEST STRIP Strp      Family History       Problem Relation (Age of Onset)    Alzheimer's disease Mother    Cancer Father          Tobacco Use    Smoking status: Every Day     Packs/day: 2.00     Years: 25.00     Pack years: 50.00     Types: Cigarettes    Smokeless tobacco: Never   Substance and Sexual Activity    Alcohol use: No     Alcohol/week: 0.0 standard drinks    Drug use: No     Comment: 30YEARS AGO, PATIENT ON PRESCRIBED PAIN MEDICATION.     Sexual activity: Not Currently     Review of Systems   Constitutional:  Positive for chills. Negative for activity change and fever.   HENT:  Negative for congestion.    Eyes:  Negative for photophobia.   Respiratory:  Negative for shortness of breath.    Gastrointestinal:  Negative for abdominal distention, blood in stool, nausea and vomiting.   Endocrine: Negative for cold intolerance and polyuria.   Genitourinary:  Negative for dysuria and urgency.   Musculoskeletal:  Positive for arthralgias, joint swelling and myalgias.   Skin:  Positive for color change, rash and wound.   Neurological:  Positive for headaches. Negative for dizziness, tremors, weakness and light-headedness.   Psychiatric/Behavioral:  Negative for agitation.    Objective:     Vital Signs (Most Recent):  Temp: 98 °F (36.7 °C) (12/28/22 1525)  Pulse: 70 (12/28/22 1525)  Resp: 18 (12/28/22 1525)  BP: 130/66 (12/28/22 1525)  SpO2: 95 % (12/28/22 1525) Vital Signs (24h Range):  Temp:  [96.7 °F (35.9 °C)-98.1 °F (36.7 °C)] 98 °F (36.7 °C)  Pulse:  [70-88] 70  Resp:  [18-20] 18  SpO2:  [95 %-100 %] 95 %  BP: (121-154)/(66-94) 130/66     Weight: 71 kg (156 lb 8.4 oz)  Body mass index is 28.63 kg/m².    Physical Exam  Constitutional:       General: He is in acute distress.       Appearance: Normal appearance.   HENT:      Head: Normocephalic and atraumatic.      Right Ear: There is no impacted cerumen.      Left Ear: There is no impacted cerumen.      Nose: Nose normal.   Eyes:      Extraocular Movements: Extraocular movements intact.      Pupils: Pupils are equal, round, and reactive to light.   Cardiovascular:      Rate and Rhythm: Normal rate.      Pulses: Normal pulses.   Pulmonary:      Effort: Pulmonary effort is normal.   Abdominal:      General: Bowel sounds are normal.      Palpations: Abdomen is soft.      Tenderness: There is no abdominal tenderness.   Musculoskeletal:         General: Normal range of motion.      Cervical back: Normal range of motion and neck supple.   Skin:     General: Skin is warm.      Findings: Erythema and rash present.      Comments: Erythema of left little finger extending to flexor surface of the UE up to the axilla.    Neurological:      General: No focal deficit present.      Mental Status: He is alert and oriented to person, place, and time.   Psychiatric:         Mood and Affect: Mood normal.         Behavior: Behavior normal.         CRANIAL NERVES     CN III, IV, VI   Pupils are equal, round, and reactive to light.     Significant Labs: A1C:   Recent Labs   Lab 07/13/22  0748 09/26/22  0621   HGBA1C 8.0* 7.9*     ABGs: No results for input(s): PH, PCO2, HCO3, POCSATURATED, BE, TOTALHB, COHB, METHB, O2HB, POCFIO2, PO2 in the last 48 hours.  Blood Culture: No results for input(s): LABBLOO in the last 48 hours.  CBC:   Recent Labs   Lab 12/27/22 2002   WBC 15.95*   HGB 14.7   HCT 43.8        CMP:   Recent Labs   Lab 12/27/22 2035 12/28/22  1048    137   K 3.8 4.4    101   CO2 29 27   * 404*   BUN 20 15   CREATININE 0.86 0.70   CALCIUM 9.2 9.0   PROT 7.2  --    ALBUMIN 4.3  --    BILITOT 0.4  --    ALKPHOS 78  --    AST 23  --    ALT 24  --    ANIONGAP 6* 9     Cardiac Markers: No results for input(s): CKMB, MYOGLOBIN, BNP,  TROPISTAT in the last 48 hours.  Lactic Acid: No results for input(s): LACTATE in the last 48 hours.  Lipase: No results for input(s): LIPASE in the last 48 hours.  Lipid Panel: No results for input(s): CHOL, HDL, LDLCALC, TRIG, CHOLHDL in the last 48 hours.  Magnesium: No results for input(s): MG in the last 48 hours.  Troponin: No results for input(s): TROPONINI, TROPONINIHS in the last 48 hours.  TSH:   Recent Labs   Lab 09/26/22  0621   TSH 2.770     Urine Culture: No results for input(s): LABURIN in the last 48 hours.  Urine Studies: No results for input(s): COLORU, APPEARANCEUA, PHUR, SPECGRAV, PROTEINUA, GLUCUA, KETONESU, BILIRUBINUA, OCCULTUA, NITRITE, UROBILINOGEN, LEUKOCYTESUR, RBCUA, WBCUA, BACTERIA, SQUAMEPITHEL, HYALINECASTS in the last 48 hours.    Invalid input(s): WRIGHTSUR    Significant Imaging: I have reviewed all pertinent imaging results/findings within the past 24 hours.

## 2022-12-28 NOTE — NURSING
Report received from Margie. Received in stable condition in no acute distress. Pt and daughter oriented to room and call light. VS and Bed weight taken. Will continue to monitor.

## 2022-12-28 NOTE — ASSESSMENT & PLAN NOTE
Long term current use of insulin  Patient's FSGs are uncontrolled due to hyperglycemia on current medication regimen.  Last A1c reviewed-   Lab Results   Component Value Date    HGBA1C 7.9 (H) 09/26/2022     Most recent fingerstick glucose reviewed-   Recent Labs   Lab 12/28/22  0753 12/28/22  1018 12/28/22  1148 12/28/22  1542   POCTGLUCOSE 327* 391* 329* 246*     Current correctional scale  High  Maintain anti-hyperglycemic dose as follows-   Antihyperglycemics (From admission, onward)    Start     Stop Route Frequency Ordered    12/29/22 0715  insulin aspart U-100 pen 3 Units         -- SubQ 3 times daily with meals 12/28/22 1648    12/28/22 2100  insulin detemir U-100 pen 15 Units         -- SubQ 2 times daily 12/28/22 1648    12/28/22 1746  insulin aspart U-100 pen 0-5 Units         -- SubQ Before meals & nightly PRN 12/28/22 1648        Hold Oral hypoglycemics while patient is in the hospital.  Continue Levemir  Add prandial insulin

## 2022-12-28 NOTE — ASSESSMENT & PLAN NOTE
Cellulitis of left upper extremity  Blood cx pending  started on Vanc/ Cefepime- continue   Pain control  Elevated Left upper arm  Consult ortho

## 2022-12-28 NOTE — PROGRESS NOTES
Pharmacokinetic Assessment Follow Up: IV Vancomycin    Vancomycin Regimen Plan:    Continue regimen to Vancomycin 1000 mg IV every 12 hours with next serum trough concentration measured at 1100 prior to 4th dose on 12/29/22    Drug levels (last 3 results):  No results for input(s): VANCOMYCINRA, VANCORANDOM, VANCOMYCINPE, VANCOPEAK, VANCOMYCINTR, VANCOTROUGH in the last 72 hours.    Pharmacy will continue to follow and monitor vancomycin.    Please contact pharmacy at extension [phone] for questions regarding this assessment.    Thank you for the consult,   Ebenezer Akins       Patient brief summary:  Ross Crystal is a 57 y.o. male initiated on antimicrobial therapy with IV Vancomycin for treatment of skin & soft tissue infection    The patient's current regimen is Vancomycin 1000 mg q12h    Drug Allergies:   Review of patient's allergies indicates:   Allergen Reactions    Bactrim [sulfamethoxazole-trimethoprim] Rash       Actual Body Weight:   71 kg    Renal Function:   Estimated Creatinine Clearance: 99.6 mL/min (based on SCr of 0.7 mg/dL).,     Dialysis Method (if applicable):  N/A    CBC (last 72 hours):  Recent Labs   Lab Result Units 12/27/22 2002   WBC K/uL 15.95*   Hemoglobin g/dL 14.7   Hematocrit % 43.8   Platelets K/uL 259   Gran % % 68.5   Lymph % % 22.1   Mono % % 6.7   Eosinophil % % 1.9   Basophil % % 0.3   Differential Method  Automated       Metabolic Panel (last 72 hours):  Recent Labs   Lab Result Units 12/27/22 2035 12/28/22  1048   Sodium mmol/L 136 137   Potassium mmol/L 3.8 4.4   Chloride mmol/L 101 101   CO2 mmol/L 29 27   Glucose mg/dL 236* 404*   BUN mg/dL 20 15   Creatinine mg/dL 0.86 0.70   Albumin g/dL 4.3  --    Total Bilirubin mg/dL 0.4  --    Alkaline Phosphatase U/L 78  --    AST U/L 23  --    ALT U/L 24  --        Vancomycin Administrations:  vancomycin given in the last 96 hours                     vancomycin in dextrose 5 % 1 gram/250 mL IVPB 1,000 mg (mg) 1,000 mg New Bag 12/28/22  1207    vancomycin in dextrose 5 % 1 gram/250 mL IVPB (mg) 2,000 mg New Bag 12/27/22 2200                    Microbiologic Results:  Microbiology Results (last 7 days)       Procedure Component Value Units Date/Time    Blood culture [[id number]] Collected: 12/28/22 1701    Order Status: Sent Specimen: Blood Updated: 12/28/22 1701

## 2022-12-29 ENCOUNTER — ANESTHESIA EVENT (OUTPATIENT)
Dept: SURGERY | Facility: HOSPITAL | Age: 57
DRG: 513 | End: 2022-12-29
Payer: MEDICAID

## 2022-12-29 ENCOUNTER — ANESTHESIA (OUTPATIENT)
Dept: SURGERY | Facility: HOSPITAL | Age: 57
DRG: 513 | End: 2022-12-29
Payer: MEDICAID

## 2022-12-29 ENCOUNTER — CLINICAL SUPPORT (OUTPATIENT)
Dept: SMOKING CESSATION | Facility: CLINIC | Age: 57
End: 2022-12-29

## 2022-12-29 DIAGNOSIS — F17.210 CIGARETTE SMOKER: Primary | ICD-10-CM

## 2022-12-29 LAB
ANION GAP SERPL CALC-SCNC: 10 MMOL/L (ref 8–16)
BASOPHILS # BLD AUTO: 0.05 K/UL (ref 0–0.2)
BASOPHILS NFR BLD: 0.4 % (ref 0–1.9)
BUN SERPL-MCNC: 12 MG/DL (ref 6–20)
CALCIUM SERPL-MCNC: 9.4 MG/DL (ref 8.7–10.5)
CHLORIDE SERPL-SCNC: 103 MMOL/L (ref 95–110)
CO2 SERPL-SCNC: 23 MMOL/L (ref 23–29)
CREAT SERPL-MCNC: 0.8 MG/DL (ref 0.5–1.4)
DIFFERENTIAL METHOD: ABNORMAL
EOSINOPHIL # BLD AUTO: 0.3 K/UL (ref 0–0.5)
EOSINOPHIL NFR BLD: 2.6 % (ref 0–8)
ERYTHROCYTE [DISTWIDTH] IN BLOOD BY AUTOMATED COUNT: 12.9 % (ref 11.5–14.5)
EST. GFR  (NO RACE VARIABLE): >60 ML/MIN/1.73 M^2
GLUCOSE SERPL-MCNC: 149 MG/DL (ref 70–110)
HCT VFR BLD AUTO: 43.2 % (ref 40–54)
HGB BLD-MCNC: 14.5 G/DL (ref 14–18)
IMM GRANULOCYTES # BLD AUTO: 0.07 K/UL (ref 0–0.04)
IMM GRANULOCYTES NFR BLD AUTO: 0.6 % (ref 0–0.5)
LYMPHOCYTES # BLD AUTO: 3 K/UL (ref 1–4.8)
LYMPHOCYTES NFR BLD: 25.1 % (ref 18–48)
MCH RBC QN AUTO: 28.5 PG (ref 27–31)
MCHC RBC AUTO-ENTMCNC: 33.6 G/DL (ref 32–36)
MCV RBC AUTO: 85 FL (ref 82–98)
MONOCYTES # BLD AUTO: 0.8 K/UL (ref 0.3–1)
MONOCYTES NFR BLD: 6.3 % (ref 4–15)
NEUTROPHILS # BLD AUTO: 7.8 K/UL (ref 1.8–7.7)
NEUTROPHILS NFR BLD: 65 % (ref 38–73)
NRBC BLD-RTO: 0 /100 WBC
PLATELET # BLD AUTO: 235 K/UL (ref 150–450)
PMV BLD AUTO: 8.7 FL (ref 9.2–12.9)
POCT GLUCOSE: 123 MG/DL (ref 70–110)
POCT GLUCOSE: 133 MG/DL (ref 70–110)
POCT GLUCOSE: 143 MG/DL (ref 70–110)
POCT GLUCOSE: 256 MG/DL (ref 70–110)
POCT GLUCOSE: 382 MG/DL (ref 70–110)
POCT GLUCOSE: 67 MG/DL (ref 70–110)
POTASSIUM SERPL-SCNC: 4.2 MMOL/L (ref 3.5–5.1)
RBC # BLD AUTO: 5.09 M/UL (ref 4.6–6.2)
SODIUM SERPL-SCNC: 136 MMOL/L (ref 136–145)
VANCOMYCIN TROUGH SERPL-MCNC: 8.9 UG/ML (ref 10–22)
WBC # BLD AUTO: 11.92 K/UL (ref 3.9–12.7)

## 2022-12-29 PROCEDURE — D9220A PRA ANESTHESIA: Mod: ANES,,, | Performed by: ANESTHESIOLOGY

## 2022-12-29 PROCEDURE — 99406 PT REFUSED TOBACCO CESSATION: ICD-10-PCS | Mod: S$GLB,,,

## 2022-12-29 PROCEDURE — 80048 BASIC METABOLIC PNL TOTAL CA: CPT | Performed by: FAMILY MEDICINE

## 2022-12-29 PROCEDURE — 63600175 PHARM REV CODE 636 W HCPCS: Performed by: NURSE ANESTHETIST, CERTIFIED REGISTERED

## 2022-12-29 PROCEDURE — 25000003 PHARM REV CODE 250: Performed by: ORTHOPAEDIC SURGERY

## 2022-12-29 PROCEDURE — 87070 CULTURE OTHR SPECIMN AEROBIC: CPT | Performed by: ORTHOPAEDIC SURGERY

## 2022-12-29 PROCEDURE — 87102 FUNGUS ISOLATION CULTURE: CPT | Performed by: ORTHOPAEDIC SURGERY

## 2022-12-29 PROCEDURE — 36415 COLL VENOUS BLD VENIPUNCTURE: CPT | Performed by: FAMILY MEDICINE

## 2022-12-29 PROCEDURE — 99900035 HC TECH TIME PER 15 MIN (STAT)

## 2022-12-29 PROCEDURE — 87075 CULTR BACTERIA EXCEPT BLOOD: CPT | Performed by: ORTHOPAEDIC SURGERY

## 2022-12-29 PROCEDURE — 25000003 PHARM REV CODE 250: Performed by: NURSE ANESTHETIST, CERTIFIED REGISTERED

## 2022-12-29 PROCEDURE — D9220A PRA ANESTHESIA: Mod: CRNA,,, | Performed by: NURSE ANESTHETIST, CERTIFIED REGISTERED

## 2022-12-29 PROCEDURE — 63600175 PHARM REV CODE 636 W HCPCS: Performed by: ORTHOPAEDIC SURGERY

## 2022-12-29 PROCEDURE — 80202 ASSAY OF VANCOMYCIN: CPT | Performed by: FAMILY MEDICINE

## 2022-12-29 PROCEDURE — 36000707: Performed by: ORTHOPAEDIC SURGERY

## 2022-12-29 PROCEDURE — 85025 COMPLETE CBC W/AUTO DIFF WBC: CPT | Performed by: FAMILY MEDICINE

## 2022-12-29 PROCEDURE — 11000001 HC ACUTE MED/SURG PRIVATE ROOM

## 2022-12-29 PROCEDURE — 63600175 PHARM REV CODE 636 W HCPCS: Performed by: FAMILY MEDICINE

## 2022-12-29 PROCEDURE — D9220A PRA ANESTHESIA: ICD-10-PCS | Mod: ANES,,, | Performed by: ANESTHESIOLOGY

## 2022-12-29 PROCEDURE — 11043 PR DEBRIDEMENT, SKIN, SUB-Q TISSUE,MUSCLE,=<20 SQ CM: ICD-10-PCS | Mod: ,,, | Performed by: ORTHOPAEDIC SURGERY

## 2022-12-29 PROCEDURE — 71000033 HC RECOVERY, INTIAL HOUR: Performed by: ORTHOPAEDIC SURGERY

## 2022-12-29 PROCEDURE — 94761 N-INVAS EAR/PLS OXIMETRY MLT: CPT

## 2022-12-29 PROCEDURE — 87116 MYCOBACTERIA CULTURE: CPT | Performed by: ORTHOPAEDIC SURGERY

## 2022-12-29 PROCEDURE — 25000003 PHARM REV CODE 250: Performed by: FAMILY MEDICINE

## 2022-12-29 PROCEDURE — 37000009 HC ANESTHESIA EA ADD 15 MINS: Performed by: ORTHOPAEDIC SURGERY

## 2022-12-29 PROCEDURE — 87206 SMEAR FLUORESCENT/ACID STAI: CPT | Performed by: ORTHOPAEDIC SURGERY

## 2022-12-29 PROCEDURE — D9220A PRA ANESTHESIA: ICD-10-PCS | Mod: CRNA,,, | Performed by: NURSE ANESTHETIST, CERTIFIED REGISTERED

## 2022-12-29 PROCEDURE — 87106 FUNGI IDENTIFICATION YEAST: CPT | Performed by: ORTHOPAEDIC SURGERY

## 2022-12-29 PROCEDURE — S4991 NICOTINE PATCH NONLEGEND: HCPCS | Performed by: FAMILY MEDICINE

## 2022-12-29 PROCEDURE — 37000008 HC ANESTHESIA 1ST 15 MINUTES: Performed by: ORTHOPAEDIC SURGERY

## 2022-12-29 PROCEDURE — 99406 BEHAV CHNG SMOKING 3-10 MIN: CPT | Mod: S$GLB,,,

## 2022-12-29 PROCEDURE — 36000706: Performed by: ORTHOPAEDIC SURGERY

## 2022-12-29 PROCEDURE — 11043 DBRDMT MUSC&/FSCA 1ST 20/<: CPT | Mod: ,,, | Performed by: ORTHOPAEDIC SURGERY

## 2022-12-29 PROCEDURE — 87205 SMEAR GRAM STAIN: CPT | Performed by: ORTHOPAEDIC SURGERY

## 2022-12-29 RX ORDER — PROPOFOL 10 MG/ML
VIAL (ML) INTRAVENOUS CONTINUOUS PRN
Status: DISCONTINUED | OUTPATIENT
Start: 2022-12-29 | End: 2022-12-29

## 2022-12-29 RX ORDER — PROPOFOL 10 MG/ML
VIAL (ML) INTRAVENOUS
Status: DISCONTINUED | OUTPATIENT
Start: 2022-12-29 | End: 2022-12-29

## 2022-12-29 RX ORDER — FENTANYL CITRATE 50 UG/ML
25 INJECTION, SOLUTION INTRAMUSCULAR; INTRAVENOUS EVERY 5 MIN PRN
Status: DISCONTINUED | OUTPATIENT
Start: 2022-12-29 | End: 2022-12-29

## 2022-12-29 RX ORDER — IBUPROFEN 200 MG
1 TABLET ORAL DAILY
Status: DISCONTINUED | OUTPATIENT
Start: 2022-12-29 | End: 2022-12-31 | Stop reason: HOSPADM

## 2022-12-29 RX ORDER — SODIUM CHLORIDE 0.9 % (FLUSH) 0.9 %
3 SYRINGE (ML) INJECTION
Status: DISCONTINUED | OUTPATIENT
Start: 2022-12-29 | End: 2022-12-31 | Stop reason: HOSPADM

## 2022-12-29 RX ORDER — BUPIVACAINE HYDROCHLORIDE 2.5 MG/ML
INJECTION, SOLUTION EPIDURAL; INFILTRATION; INTRACAUDAL
Status: DISCONTINUED | OUTPATIENT
Start: 2022-12-29 | End: 2022-12-29 | Stop reason: HOSPADM

## 2022-12-29 RX ORDER — HYDROMORPHONE HYDROCHLORIDE 2 MG/ML
0.2 INJECTION, SOLUTION INTRAMUSCULAR; INTRAVENOUS; SUBCUTANEOUS EVERY 5 MIN PRN
Status: DISCONTINUED | OUTPATIENT
Start: 2022-12-29 | End: 2022-12-29

## 2022-12-29 RX ORDER — MIDAZOLAM HYDROCHLORIDE 1 MG/ML
INJECTION, SOLUTION INTRAMUSCULAR; INTRAVENOUS
Status: DISCONTINUED | OUTPATIENT
Start: 2022-12-29 | End: 2022-12-29

## 2022-12-29 RX ORDER — ONDANSETRON 2 MG/ML
4 INJECTION INTRAMUSCULAR; INTRAVENOUS ONCE AS NEEDED
Status: DISCONTINUED | OUTPATIENT
Start: 2022-12-29 | End: 2022-12-31 | Stop reason: HOSPADM

## 2022-12-29 RX ORDER — ONDANSETRON 2 MG/ML
4 INJECTION INTRAMUSCULAR; INTRAVENOUS DAILY PRN
Status: DISCONTINUED | OUTPATIENT
Start: 2022-12-29 | End: 2022-12-29

## 2022-12-29 RX ORDER — LIDOCAINE HYDROCHLORIDE 20 MG/ML
INJECTION INTRAVENOUS
Status: DISCONTINUED | OUTPATIENT
Start: 2022-12-29 | End: 2022-12-29

## 2022-12-29 RX ORDER — ONDANSETRON 2 MG/ML
INJECTION INTRAMUSCULAR; INTRAVENOUS
Status: DISCONTINUED | OUTPATIENT
Start: 2022-12-29 | End: 2022-12-29

## 2022-12-29 RX ORDER — LIDOCAINE HYDROCHLORIDE 10 MG/ML
INJECTION, SOLUTION EPIDURAL; INFILTRATION; INTRACAUDAL; PERINEURAL
Status: DISCONTINUED | OUTPATIENT
Start: 2022-12-29 | End: 2022-12-29 | Stop reason: HOSPADM

## 2022-12-29 RX ORDER — IPRATROPIUM BROMIDE AND ALBUTEROL SULFATE 2.5; .5 MG/3ML; MG/3ML
3 SOLUTION RESPIRATORY (INHALATION) EVERY 4 HOURS PRN
Status: DISCONTINUED | OUTPATIENT
Start: 2022-12-29 | End: 2022-12-29

## 2022-12-29 RX ORDER — KETOROLAC TROMETHAMINE 30 MG/ML
30 INJECTION, SOLUTION INTRAMUSCULAR; INTRAVENOUS ONCE
Status: COMPLETED | OUTPATIENT
Start: 2022-12-29 | End: 2022-12-29

## 2022-12-29 RX ADMIN — SODIUM CHLORIDE, SODIUM LACTATE, POTASSIUM CHLORIDE, AND CALCIUM CHLORIDE: .6; .31; .03; .02 INJECTION, SOLUTION INTRAVENOUS at 04:12

## 2022-12-29 RX ADMIN — VANCOMYCIN HYDROCHLORIDE 1000 MG: 1 INJECTION, POWDER, LYOPHILIZED, FOR SOLUTION INTRAVENOUS at 11:12

## 2022-12-29 RX ADMIN — MUPIROCIN: 20 OINTMENT TOPICAL at 09:12

## 2022-12-29 RX ADMIN — VANCOMYCIN HYDROCHLORIDE 1000 MG: 1 INJECTION, POWDER, LYOPHILIZED, FOR SOLUTION INTRAVENOUS at 12:12

## 2022-12-29 RX ADMIN — PROPOFOL 50 MG: 10 INJECTION, EMULSION INTRAVENOUS at 04:12

## 2022-12-29 RX ADMIN — HYDROCODONE BITARTRATE AND ACETAMINOPHEN 1 TABLET: 10; 325 TABLET ORAL at 11:12

## 2022-12-29 RX ADMIN — MUPIROCIN: 20 OINTMENT TOPICAL at 08:12

## 2022-12-29 RX ADMIN — INSULIN DETEMIR 15 UNITS: 100 INJECTION, SOLUTION SUBCUTANEOUS at 08:12

## 2022-12-29 RX ADMIN — NICOTINE 1 PATCH: 21 PATCH, EXTENDED RELEASE TRANSDERMAL at 09:12

## 2022-12-29 RX ADMIN — LIDOCAINE HYDROCHLORIDE 100 MG: 20 INJECTION, SOLUTION INTRAVENOUS at 04:12

## 2022-12-29 RX ADMIN — INSULIN ASPART 1 UNITS: 100 INJECTION, SOLUTION INTRAVENOUS; SUBCUTANEOUS at 08:12

## 2022-12-29 RX ADMIN — KETOROLAC TROMETHAMINE 30 MG: 30 INJECTION, SOLUTION INTRAMUSCULAR; INTRAVENOUS at 06:12

## 2022-12-29 RX ADMIN — CEFEPIME HYDROCHLORIDE 1 G: 1 INJECTION, SOLUTION INTRAVENOUS at 05:12

## 2022-12-29 RX ADMIN — VANCOMYCIN HYDROCHLORIDE 1250 MG: 1.25 INJECTION, POWDER, LYOPHILIZED, FOR SOLUTION INTRAVENOUS at 11:12

## 2022-12-29 RX ADMIN — PRAVASTATIN SODIUM 40 MG: 40 TABLET ORAL at 08:12

## 2022-12-29 RX ADMIN — ONDANSETRON 8 MG: 2 INJECTION, SOLUTION INTRAMUSCULAR; INTRAVENOUS at 04:12

## 2022-12-29 RX ADMIN — HYDROCODONE BITARTRATE AND ACETAMINOPHEN 1 TABLET: 10; 325 TABLET ORAL at 06:12

## 2022-12-29 RX ADMIN — HYDROCODONE BITARTRATE AND ACETAMINOPHEN 1 TABLET: 10; 325 TABLET ORAL at 01:12

## 2022-12-29 RX ADMIN — MIDAZOLAM 2 MG: 1 INJECTION INTRAMUSCULAR; INTRAVENOUS at 04:12

## 2022-12-29 RX ADMIN — HEPARIN SODIUM 5000 UNITS: 5000 INJECTION INTRAVENOUS; SUBCUTANEOUS at 09:12

## 2022-12-29 RX ADMIN — CEFEPIME HYDROCHLORIDE 1 G: 1 INJECTION, SOLUTION INTRAVENOUS at 01:12

## 2022-12-29 RX ADMIN — HEPARIN SODIUM 5000 UNITS: 5000 INJECTION INTRAVENOUS; SUBCUTANEOUS at 05:12

## 2022-12-29 RX ADMIN — CEFEPIME HYDROCHLORIDE 1 G: 1 INJECTION, SOLUTION INTRAVENOUS at 09:12

## 2022-12-29 RX ADMIN — PROPOFOL 100 MCG/KG/MIN: 10 INJECTION, EMULSION INTRAVENOUS at 04:12

## 2022-12-29 RX ADMIN — TAMSULOSIN HYDROCHLORIDE 0.4 MG: 0.4 CAPSULE ORAL at 09:12

## 2022-12-29 NOTE — H&P
[facility]  History & Physical    Patient Name: Ross Crystal  MRN: [id number]  Patient Class: IP- Inpatient  Admission Date: 12/28/2022  Attending Physician: Gege Luis MD  Primary Care Provider: Ross Valverde MD         Patient information was obtained from ER records.     Subjective:     Principal Problem:Cellulitis of left little finger    Chief Complaint:   Chief Complaint   Patient presents with    Finger Pain        HPI: Bonilla Rowan is a 56 y/o M with PM H of DM II, hyperlipidemia, hypertension, BPH, went to ED yesterday with few hours history of his left pinky pain, pin was abou 10/10 throbbing and radiating to the upper arm,  there is associated redness, swelling, headache and chills. Patient reported he had a fishhook stuck in her finger yesterday morning and attempted to pull it out with a knife without success and think part of it was still left in the finger. He denies fever, nausea, vomiting, weakness, numbness.   In the ED Xray of the 5 th finger did not show no radiopaque foreign body, WBC elevated, BG elevated. Transferred to [facility] for orthopedic evaluation and treatment       Past Medical History:   Diagnosis Date    Benign paroxysmal vertigo, bilateral     Diabetes mellitus     Diabetes mellitus type I     Hyperlipemia     Renal disorder     Urinary tract infection        Past Surgical History:   Procedure Laterality Date    CARPAL TUNNEL RELEASE  25 YEARS AGO    COLONOSCOPY      COLONOSCOPY N/A 1/25/2016    Procedure: COLONOSCOPY;  Surgeon: Luis Bogran-Reyes, MD;  Location: [facility];  Service: Endoscopy;  Laterality: N/A;    ESOPHAGOGASTRODUODENOSCOPY      HERNIA REPAIR      HERNIA REPAIR         Review of patient's allergies indicates:   Allergen Reactions    Bactrim [sulfamethoxazole-trimethoprim] Rash       Current Facility-Administered Medications on File Prior to Encounter   Medication    [COMPLETED] cefepime in dextrose 5 % IVPB 2  g    [COMPLETED] ciprofloxacin (CIPRO)400mg/200ml D5W IVPB 400 mg    [COMPLETED] ciprofloxacin (CIPRO)400mg/200ml D5W IVPB 400 mg    [COMPLETED] doxycycline capsule 100 mg    [COMPLETED] doxycycline tablet 100 mg    [COMPLETED] HYDROcodone-acetaminophen 5-325 mg per tablet 2 tablet    [COMPLETED] ketorolac tablet 10 mg    [COMPLETED] LIDOcaine HCL 10 mg/ml (1%) injection 1 mL    [COMPLETED] Tdap (BOOSTRIX) vaccine injection 0.5 mL    [] vancomycin 2 g in 0.9% sodium chloride 500 mL IVPB    [COMPLETED] vancomycin in dextrose 5 % 1 gram/250 mL IVPB    [DISCONTINUED] cefepime in dextrose 5 % IVPB 2 g    [DISCONTINUED] cefepime in dextrose 5 % IVPB 2 g    [DISCONTINUED] dextrose 10% bolus 125 mL 125 mL    [DISCONTINUED] dextrose 10% bolus 250 mL 250 mL    [DISCONTINUED] doxycycline tablet 100 mg    [DISCONTINUED] glucagon (human recombinant) injection 1 mg    [DISCONTINUED] glucose chewable tablet 16 g    [DISCONTINUED] glucose chewable tablet 24 g    [DISCONTINUED] HYDROcodone-acetaminophen  mg per tablet 1 tablet    [DISCONTINUED] HYDROcodone-acetaminophen 5-325 mg per tablet 2 tablet    [DISCONTINUED] insulin aspart U-100 pen 1-10 Units    [DISCONTINUED] insulin aspart U-100 pen 5 Units    [DISCONTINUED] insulin detemir U-100 pen 25 Units    [DISCONTINUED] pravastatin tablet 40 mg    [DISCONTINUED] tamsulosin 24 hr capsule 0.4 mg    [DISCONTINUED] vancomycin - pharmacy to dose    [DISCONTINUED] vancomycin in dextrose 5 % 1 gram/250 mL IVPB 1,000 mg     Current Outpatient Medications on File Prior to Encounter   Medication Sig    aspirin (ECOTRIN) 81 MG EC tablet Take 81 mg by mouth once daily.    fish oil-omega-3 fatty acids 300-1,000 mg capsule Take 1 capsule by mouth 2 (two) times a day.    hydrocodone-acetaminophen 10-325mg (NORCO)  mg Tab Take 1 tablet by mouth 2 (two) times a day.    LEVEMIR FLEXTOUCH U-100 INSULN 100 unit/mL (3 mL) InPn pen Inject 35 Units into  "the skin 2 (two) times daily.    meclizine (ANTIVERT) 50 MG tablet Take 50 mg by mouth 2 (two) times daily.    NOVOLOG FLEXPEN U-100 INSULIN 100 unit/mL (3 mL) InPn pen SMARTSI-12 Unit(s) SUB-Q As Directed    pravastatin (PRAVACHOL) 40 MG tablet Take 40 mg by mouth every evening.    tamsulosin (FLOMAX) 0.4 mg Cap Take 0.4 mg by mouth once daily.    BD ULTRA-FINE JENNY PEN NEEDLE 32 gauge x 5/32" Ndle 4 (four) times daily.    DEXCOM G6  Misc     DEXCOM G6 SENSOR Sadaf SMARTSI Each Topical Every 10 Days    DEXCOM G6 TRANSMITTER Sadaf     insulin syringe-needle U-100 1 mL 31 gauge x 5/16 Syrg     [DISCONTINUED] BAQSIMI 3 mg/actuation Spry SMARTSI Spray(s) In Nostril    [DISCONTINUED] BINAXNOW COVID-19 AG SELF TEST Kit Use as Directed on the Package    [DISCONTINUED] flash glucose scanning reader (FREESTYLE JULIO 10 DAY READER) Misc as directed    [DISCONTINUED] FREESTYLE JULIO 14 DAY SENSOR Kit use as directed    [DISCONTINUED] ibuprofen (ADVIL,MOTRIN) 800 MG tablet Take 800 mg by mouth 3 (three) times daily.    [DISCONTINUED] LEVEMIR U-100 INSULIN 100 unit/mL injection SMARTSI Unit(s) SUB-Q Twice Daily    [DISCONTINUED] LIDOcaine-prilocaine (EMLA) cream Apply topically.    [DISCONTINUED] metformin (GLUCOPHAGE) 1000 MG tablet 1,000 mg 2 (two) times daily with meals.     [DISCONTINUED] mirtazapine (REMERON) 30 MG tablet Take 30 mg by mouth every evening.    [DISCONTINUED] TRUE METRIX GLUCOSE TEST STRIP Strp      Family History       Problem Relation (Age of Onset)    Alzheimer's disease Mother    Cancer Father          Tobacco Use    Smoking status: Every Day     Packs/day: 2.00     Years: 25.00     Pack years: 50.00     Types: Cigarettes    Smokeless tobacco: Never   Substance and Sexual Activity    Alcohol use: No     Alcohol/week: 0.0 standard drinks    Drug use: No     Comment: 30YEARS AGO, PATIENT ON PRESCRIBED PAIN MEDICATION.     Sexual activity: Not Currently     Review " of Systems   Constitutional:  Positive for chills. Negative for activity change and fever.   HENT:  Negative for congestion.    Eyes:  Negative for photophobia.   Respiratory:  Negative for shortness of breath.    Gastrointestinal:  Negative for abdominal distention, blood in stool, nausea and vomiting.   Endocrine: Negative for cold intolerance and polyuria.   Genitourinary:  Negative for dysuria and urgency.   Musculoskeletal:  Positive for arthralgias, joint swelling and myalgias.   Skin:  Positive for color change, rash and wound.   Neurological:  Positive for headaches. Negative for dizziness, tremors, weakness and light-headedness.   Psychiatric/Behavioral:  Negative for agitation.    Objective:     Vital Signs (Most Recent):  Temp: 98 °F (36.7 °C) (12/28/22 1525)  Pulse: 70 (12/28/22 1525)  Resp: 18 (12/28/22 1525)  BP: 130/66 (12/28/22 1525)  SpO2: 95 % (12/28/22 1525) Vital Signs (24h Range):  Temp:  [96.7 °F (35.9 °C)-98.1 °F (36.7 °C)] 98 °F (36.7 °C)  Pulse:  [70-88] 70  Resp:  [18-20] 18  SpO2:  [95 %-100 %] 95 %  BP: (121-154)/(66-94) 130/66     Weight: 71 kg (156 lb 8.4 oz)  Body mass index is 28.63 kg/m².    Physical Exam  Constitutional:       General: He is in acute distress.      Appearance: Normal appearance.   HENT:      Head: Normocephalic and atraumatic.      Right Ear: There is no impacted cerumen.      Left Ear: There is no impacted cerumen.      Nose: Nose normal.   Eyes:      Extraocular Movements: Extraocular movements intact.      Pupils: Pupils are equal, round, and reactive to light.   Cardiovascular:      Rate and Rhythm: Normal rate.      Pulses: Normal pulses.   Pulmonary:      Effort: Pulmonary effort is normal.   Abdominal:      General: Bowel sounds are normal.      Palpations: Abdomen is soft.      Tenderness: There is no abdominal tenderness.   Musculoskeletal:         General: Normal range of motion.      Cervical back: Normal range of motion and neck supple.   Skin:      General: Skin is warm.      Findings: Erythema and rash present.      Comments: Erythema of left little finger extending to flexor surface of the UE up to the axilla.    Neurological:      General: No focal deficit present.      Mental Status: He is alert and oriented to person, place, and time.   Psychiatric:         Mood and Affect: Mood normal.         Behavior: Behavior normal.         CRANIAL NERVES     CN III, IV, VI   Pupils are equal, round, and reactive to light.     Significant Labs: A1C:   Recent Labs   Lab 07/13/22  0748 09/26/22  0621   HGBA1C 8.0* 7.9*     ABGs: No results for input(s): PH, PCO2, HCO3, POCSATURATED, BE, TOTALHB, COHB, METHB, O2HB, POCFIO2, PO2 in the last 48 hours.  Blood Culture: No results for input(s): LABBLOO in the last 48 hours.  CBC:   Recent Labs   Lab 12/27/22 2002   WBC 15.95*   HGB 14.7   HCT 43.8        CMP:   Recent Labs   Lab 12/27/22 2035 12/28/22  1048    137   K 3.8 4.4    101   CO2 29 27   * 404*   BUN 20 15   CREATININE 0.86 0.70   CALCIUM 9.2 9.0   PROT 7.2  --    ALBUMIN 4.3  --    BILITOT 0.4  --    ALKPHOS 78  --    AST 23  --    ALT 24  --    ANIONGAP 6* 9     Cardiac Markers: No results for input(s): CKMB, MYOGLOBIN, BNP, TROPISTAT in the last 48 hours.  Lactic Acid: No results for input(s): LACTATE in the last 48 hours.  Lipase: No results for input(s): LIPASE in the last 48 hours.  Lipid Panel: No results for input(s): CHOL, HDL, LDLCALC, TRIG, CHOLHDL in the last 48 hours.  Magnesium: No results for input(s): MG in the last 48 hours.  Troponin: No results for input(s): TROPONINI, TROPONINIHS in the last 48 hours.  TSH:   Recent Labs   Lab 09/26/22  0621   TSH 2.770     Urine Culture: No results for input(s): LABURIN in the last 48 hours.  Urine Studies: No results for input(s): COLORU, APPEARANCEUA, PHUR, SPECGRAV, PROTEINUA, GLUCUA, KETONESU, BILIRUBINUA, OCCULTUA, NITRITE, UROBILINOGEN, LEUKOCYTESUR, RBCUA, WBCUA, BACTERIA,  SQUAMEPITHEL, HYALINECASTS in the last 48 hours.    Invalid input(s): OLIVE    Significant Imaging: I have reviewed all pertinent imaging results/findings within the past 24 hours.    Assessment/Plan:     * Cellulitis of left little finger  Cellulitis of left upper extremity  Blood cx pending  started on Vanc/ Cefepime- continue   Pain control  Elevated Left upper arm  Consult ortho      Type 2 diabetes mellitus without complication  Long term current use of insulin  Patient's FSGs are uncontrolled due to hyperglycemia on current medication regimen.  Last A1c reviewed-   Lab Results   Component Value Date    HGBA1C 7.9 (H) 09/26/2022     Most recent fingerstick glucose reviewed-   Recent Labs   Lab 12/28/22  0753 12/28/22  1018 12/28/22  1148 12/28/22  1542   POCTGLUCOSE 327* 391* 329* 246*     Current correctional scale  High  Maintain anti-hyperglycemic dose as follows-   Antihyperglycemics (From admission, onward)    Start     Stop Route Frequency Ordered    12/29/22 0715  insulin aspart U-100 pen 3 Units         -- SubQ 3 times daily with meals 12/28/22 1648    12/28/22 2100  insulin detemir U-100 pen 15 Units         -- SubQ 2 times daily 12/28/22 1648    12/28/22 1746  insulin aspart U-100 pen 0-5 Units         -- SubQ Before meals & nightly PRN 12/28/22 1648        Hold Oral hypoglycemics while patient is in the hospital.  Continue Levemir  Add prandial insulin    Tobacco abuse  Counseling on smoking cessation  Declining nicotine patch for now    Pure hypercholesterolemia  Continue pravachol      Opioid dependence  On long term norco      Essential hypertension  Elevated on admit  Not any medication    Chronic pain syndrome  On chronic norco      Benign prostatic hyperplasia without urinary obstruction  Continue flomax        VTE Risk Mitigation (From admission, onward)         Ordered     heparin (porcine) injection 5,000 Units  Every 8 hours         12/28/22 1640     IP VTE HIGH RISK PATIENT  Once          12/28/22 1640     Place sequential compression device  Until discontinued         12/28/22 1640                   Gege N MD Janelle  Department of Hospital Medicine   [facility]

## 2022-12-29 NOTE — ASSESSMENT & PLAN NOTE
Long term current use of insulin  Patient's FSGs are uncontrolled due to hyperglycemia on current medication regimen.  Last A1c reviewed-   Lab Results   Component Value Date    HGBA1C 9.0 (H) 12/28/2022     Most recent fingerstick glucose reviewed-   Recent Labs   Lab 12/28/22  1943 12/29/22  0451 12/29/22  0535 12/29/22  1143   POCTGLUCOSE 359* 67* 133* 123*     Current correctional scale  High  Maintain anti-hyperglycemic dose as follows-   Antihyperglycemics (From admission, onward)    Start     Stop Route Frequency Ordered    12/29/22 0715  insulin aspart U-100 pen 3 Units         -- SubQ 3 times daily with meals 12/28/22 1648    12/28/22 2100  insulin detemir U-100 pen 15 Units         -- SubQ 2 times daily 12/28/22 1648    12/28/22 1746  insulin aspart U-100 pen 0-5 Units         -- SubQ Before meals & nightly PRN 12/28/22 1648        Hold Oral hypoglycemics while patient is in the hospital.  Continue Levemir  Add prandial insulin

## 2022-12-29 NOTE — PROGRESS NOTES
Smoking cessation education note: Pt is a 2 to 3 pk/day cigarette smoker x 33 yrs. He declines referral to Ambulatory Smoking Cessation clinic, stating that he would like to attempt quitting on his own first. Handout provided.

## 2022-12-29 NOTE — ASSESSMENT & PLAN NOTE
History of 2-3 packs of cigarette daily times 30 years and now open to nicotine patch  Counseling on smoking cessation  Nicotine patch

## 2022-12-29 NOTE — PROGRESS NOTES
[facility]  Progress Note    Patient Name: Rsos Crystal  MRN: [id number]  Patient Class: IP- Inpatient   Admission Date: 12/28/2022  Length of Stay: 1 days  Attending Physician: Gege Luis*  Primary Care Provider: Ross Valverde MD        Subjective:     Principal Problem:Cellulitis of left little finger        HPI:  Bonilla Rowan is a 58 y/o M with PM H of DM II, hyperlipidemia, hypertension, BPH, went to ED yesterday with few hours history of his left pinky pain, pin was abou 10/10 throbbing and radiating to the upper arm,  there is associated redness, swelling, headache and chills. Patient reported he had a fishhook stuck in her finger yesterday morning and attempted to pull it out with a knife without success and think part of it was still left in the finger. He denies fever, nausea, vomiting, weakness, numbness.   In the ED Xray of the 5 th finger did not show no radiopaque foreign body, WBC elevated, BG elevated. Transferred to [facility] for orthopedic evaluation and treatment       Overview/Hospital Course:  No notes on file    Interval History:  Awake and alert, pacing the room left upper extremity redness much improved left pinky swelling and redness improved.  Labs pending this morning  Awaiting ortho eval and recs   Patient reported 2-3 packs of cigarette daily times 30 years and now open to nicotine patch    Review of Systems   Constitutional:  Negative for activity change, chills and fever.   Respiratory:  Negative for shortness of breath.    Gastrointestinal:  Negative for abdominal distention, blood in stool, nausea and vomiting.   Genitourinary:  Negative for dysuria and urgency.   Musculoskeletal:  Positive for arthralgias, joint swelling and myalgias.   Skin:  Positive for color change, rash and wound.   Neurological:  Negative for dizziness, tremors, weakness, light-headedness and headaches.   Psychiatric/Behavioral:  Negative for agitation.    Objective:      Vital Signs (Most Recent):  Temp: 98.1 °F (36.7 °C) (12/29/22 1149)  Pulse: 68 (12/29/22 1149)  Resp: 20 (12/29/22 1149)  BP: 109/84 (12/29/22 1149)  SpO2: 96 % (12/29/22 1149) Vital Signs (24h Range):  Temp:  [97.6 °F (36.4 °C)-98.2 °F (36.8 °C)] 98.1 °F (36.7 °C)  Pulse:  [63-80] 68  Resp:  [17-22] 20  SpO2:  [93 %-96 %] 96 %  BP: (105-137)/(55-84) 109/84     Weight: 72.9 kg (160 lb 11.5 oz)  Body mass index is 29.4 kg/m².    Intake/Output Summary (Last 24 hours) at 12/29/2022 1259  Last data filed at 12/29/2022 0326  Gross per 24 hour   Intake 300 ml   Output 1150 ml   Net -850 ml      Physical Exam  Constitutional:       General: He is in acute distress.      Appearance: Normal appearance.   HENT:      Head: Normocephalic and atraumatic.   Cardiovascular:      Rate and Rhythm: Normal rate.      Pulses: Normal pulses.   Pulmonary:      Effort: Pulmonary effort is normal.   Abdominal:      General: Bowel sounds are normal.      Palpations: Abdomen is soft.      Tenderness: There is no abdominal tenderness.   Musculoskeletal:         General: Normal range of motion.      Cervical back: Normal range of motion and neck supple.   Skin:     General: Skin is warm.      Findings: Erythema and rash present.      Comments: Erythema of left little finger extending to flexor surface of the UE up to the axilla resolved.  Left pinky swelling and eryrthematous   Neurological:      General: No focal deficit present.      Mental Status: He is alert and oriented to person, place, and time.   Psychiatric:         Mood and Affect: Mood normal.         Behavior: Behavior normal.       Significant Labs: A1C:   Recent Labs   Lab 09/26/22  0621 12/28/22  1027 12/28/22  1701   HGBA1C 7.9* 9.0* 9.0*     ABGs: No results for input(s): PH, PCO2, HCO3, POCSATURATED, BE, TOTALHB, COHB, METHB, O2HB, POCFIO2, PO2 in the last 48 hours.  Blood Culture:   Recent Labs   Lab 12/28/22  1701   LABBLOO No Growth to date     CBC:   Recent Labs   Lab  12/27/22 2002   WBC 15.95*   HGB 14.7   HCT 43.8        CMP:   Recent Labs   Lab 12/27/22 2035 12/28/22  1048    137   K 3.8 4.4    101   CO2 29 27   * 404*   BUN 20 15   CREATININE 0.86 0.70   CALCIUM 9.2 9.0   PROT 7.2  --    ALBUMIN 4.3  --    BILITOT 0.4  --    ALKPHOS 78  --    AST 23  --    ALT 24  --    ANIONGAP 6* 9     Lactic Acid: No results for input(s): LACTATE in the last 48 hours.  Lipase: No results for input(s): LIPASE in the last 48 hours.  Lipid Panel: No results for input(s): CHOL, HDL, LDLCALC, TRIG, CHOLHDL in the last 48 hours.  Magnesium: No results for input(s): MG in the last 48 hours.  Troponin: No results for input(s): TROPONINI, TROPONINIHS in the last 48 hours.  TSH:   Recent Labs   Lab 09/26/22  0621   TSH 2.770     Urine Culture: No results for input(s): LABURIN in the last 48 hours.  Urine Studies: No results for input(s): COLORU, APPEARANCEUA, PHUR, SPECGRAV, PROTEINUA, GLUCUA, KETONESU, BILIRUBINUA, OCCULTUA, NITRITE, UROBILINOGEN, LEUKOCYTESUR, RBCUA, WBCUA, BACTERIA, SQUAMEPITHEL, HYALINECASTS in the last 48 hours.    Invalid input(s): WRIGHTSUR    Significant Imaging: I have reviewed all pertinent imaging results/findings within the past 24 hours.      Assessment/Plan:      * Cellulitis of left little finger  Cellulitis of left upper extremity  Blood cx NGTD  started on Vanc/ Cefepime- continue   Pain control  Elevated Left upper arm  Consult ortho      Type 2 diabetes mellitus without complication  Long term current use of insulin  Patient's FSGs are uncontrolled due to hyperglycemia on current medication regimen.  Last A1c reviewed-   Lab Results   Component Value Date    HGBA1C 9.0 (H) 12/28/2022     Most recent fingerstick glucose reviewed-   Recent Labs   Lab 12/28/22  1943 12/29/22  0451 12/29/22  0535 12/29/22  1143   POCTGLUCOSE 359* 67* 133* 123*     Current correctional scale  High  Maintain anti-hyperglycemic dose as follows-    Antihyperglycemics (From admission, onward)    Start     Stop Route Frequency Ordered    12/29/22 0715  insulin aspart U-100 pen 3 Units         -- SubQ 3 times daily with meals 12/28/22 1648    12/28/22 2100  insulin detemir U-100 pen 15 Units         -- SubQ 2 times daily 12/28/22 1648    12/28/22 1746  insulin aspart U-100 pen 0-5 Units         -- SubQ Before meals & nightly PRN 12/28/22 1648        Hold Oral hypoglycemics while patient is in the hospital.  Continue Levemir  Add prandial insulin    Tobacco abuse  History of 2-3 packs of cigarette daily times 30 years and now open to nicotine patch  Counseling on smoking cessation  Nicotine patch    Pure hypercholesterolemia  Continue pravachol      Opioid dependence  On long term norco      Essential hypertension  Elevated on admit  Not any medication    Chronic pain syndrome  On chronic norco      Benign prostatic hyperplasia without urinary obstruction  Continue flomax        VTE Risk Mitigation (From admission, onward)         Ordered     heparin (porcine) injection 5,000 Units  Every 8 hours         12/28/22 1640     IP VTE HIGH RISK PATIENT  Once         12/28/22 1640     Place sequential compression device  Until discontinued         12/28/22 1640                Discharge Planning   NAZ:      Code Status: Full Code   Is the patient medically ready for discharge?:     Reason for patient still in hospital (select all that apply): Patient trending condition                     Gege Luis MD  Department of Hospital Medicine   [facility]

## 2022-12-29 NOTE — PLAN OF CARE
[facility] - Med Surg  Initial Discharge Assessment       Primary Care Provider: Ross Valverde MD    Admission Diagnosis: Foreign body (FB) in soft tissue [M79.5]    Admission Date: 12/28/2022  Expected Discharge Date:     Pt oriented. DCA done at bedside with patient. Demographics/Ins/NextofKin/PCP verified with patient/family and updated as needed. Denies any DME needs at present from pt/family. Patient/family plans to return home with family. Educated on bedside RX. Patient consents for TN to discuss discharge plan with next of kin. Preferences appointment times and location obtained. Patient reports they will have transportation home upon discharge.      Discharge Barriers Identified: (P) None    Payor: MEDICAID / Plan[place] / Product Type: Managed Medicaid /     Extended Emergency Contact Information  Primary Emergency Contact: Hallie Crystal   [place]  Mobile Phone: [phone]  Relation: Daughter    Discharge Plan A: (P) Home         [facility] [place] [address]  [address]  [facility] [address]  Phone: [phone] Fax: [phone]    [facility]  [address] [facility] [address]  Phone: [phone] Fax: [phone]    [facility] - [address]  [address]  [facility] [address]  Phone: [phone] Fax: [phone]      Initial Assessment (most recent)       Adult Discharge Assessment - 12/29/22 1336          Discharge Assessment    Assessment Type Discharge Planning Assessment (P)      Confirmed/corrected address, phone number and insurance Yes (P)      Confirmed Demographics Correct on Facesheet (P)      Source of Information patient (P)      Communicated NAZ with patient/caregiver Yes (P)      People in Home alone (P)      Do you expect to return to your current living situation? Yes (P)      Prior to hospitilization cognitive status: Alert/Oriented;No Deficits  (P)      Current cognitive status: Alert/Oriented;No Deficits (P)      Equipment Currently Used at Home none (P)      Readmission within 30 days? No (P)      Patient currently being followed by outpatient case management? No (P)      Do you currently have service(s) that help you manage your care at home? No (P)      Do you take prescription medications? Yes (P)      Is the patient taking medications as prescribed? yes (P)      How do you get to doctors appointments? car, drives self (P)      Discharge Plan A Home (P)      DME Needed Upon Discharge  none (P)      Discharge Plan discussed with: Patient (P)      Discharge Barriers Identified None (P)                         No future appointments.     Follow-up Information       Ross Valverde MD. Go in 1 week(s).    Specialty: Family Medicine  Why: FOLLOW UP WITH PCP  Contact information:  [address]  [address] [facility] [address]  [phone]               Abdon Reid Jr, MD. Go in 1 week(s).    Specialties: Hand Surgery, Orthopedic Surgery  Why: FOLLOW UP WITH ORTHOPEDIC MD AFTER DISCHARGE  Contact information:  [address]   [address]  [facility] [address]  [phone]                            ceFEPime (MAXIPIME) IVPB  1 g Intravenous Q8H    heparin (porcine)  5,000 Units Subcutaneous Q8H    insulin aspart U-100  3 Units Subcutaneous TIDWM    insulin detemir U-100  15 Units Subcutaneous BID    mupirocin   Nasal BID    nicotine  1 patch Transdermal Daily    pravastatin  40 mg Oral QHS    tamsulosin  0.4 mg Oral Daily    [START ON 12/30/2022] vancomycin (VANCOCIN) IVPB  1,250 mg Intravenous Q12H

## 2022-12-29 NOTE — PLAN OF CARE
"   12/29/22 1321   Post-Acute Status   Post-Acute Authorization Other   Other Status Awaiting f/u Appts        Follow-up Information       Ross Valverde MD. Go in 1 week(s).    Specialty: Family Medicine  Why: FOLLOW UP WITH PCP  Contact information:  [address]  [address]  [facility]  [place] [address]  [phone]               Abdon Reid Jr, MD. Go in 1 week(s).    Specialties: Hand Surgery, Orthopedic Surgery  Why: FOLLOW UP WITH ORTHOPEDIC MD AFTER DISCHARGE  Contact information:  [address]  [address]  [place] [address] [phone]                           /84   Pulse 68   Temp 98.1 °F (36.7 °C)   Resp 18   Ht 5' 2" (1.575 m)   Wt 72.9 kg (160 lb 11.5 oz)   SpO2 96%   BMI 29.40 kg/m²      ceFEPime (MAXIPIME) IVPB  1 g Intravenous Q8H    heparin (porcine)  5,000 Units Subcutaneous Q8H    insulin aspart U-100  3 Units Subcutaneous TIDWM    insulin detemir U-100  15 Units Subcutaneous BID    mupirocin   Nasal BID    nicotine  1 patch Transdermal Daily    pravastatin  40 mg Oral QHS    tamsulosin  0.4 mg Oral Daily    [START ON 12/30/2022] vancomycin (VANCOCIN) IVPB  1,250 mg Intravenous Q12H       "

## 2022-12-29 NOTE — PLAN OF CARE
Pt is AAOx4. Pt given medications as ordered per MAR. IV abx given as scheduled. PRN [place] and Scheduled Toradol given for pain. Blood glucose monitoring maintained. LUE elevated on pillow. Urinal provided and within reach. Safety maintained. Instructed to use call light for assistance. Will continue to monitor.        Problem: Adult Inpatient Plan of Care  Goal: Optimal Comfort and Wellbeing  Outcome: Ongoing, Progressing     Problem: Diabetes Comorbidity  Goal: Blood Glucose Level Within Targeted Range  Outcome: Ongoing, Progressing     Problem: Pain Acute  Goal: Acceptable Pain Control and Functional Ability  Outcome: Ongoing, Progressing     Problem: Infection  Goal: Absence of Infection Signs and Symptoms  Outcome: Ongoing, Progressing

## 2022-12-29 NOTE — PLAN OF CARE
Pt AAO. Medication administered per MAR. NPO since midnight. Safety maintained and pt free from falls. Bed at lowest position and locked. Pt instructed to call if need, verbalized understanding. Call light within pt reach.   Problem: Adult Inpatient Plan of Care  Goal: Plan of Care Review  Outcome: Ongoing, Progressing  Goal: Patient-Specific Goal (Individualized)  Outcome: Ongoing, Progressing  Goal: Absence of Hospital-Acquired Illness or Injury  Outcome: Ongoing, Progressing  Goal: Optimal Comfort and Wellbeing  Outcome: Ongoing, Progressing  Goal: Readiness for Transition of Care  Outcome: Ongoing, Progressing

## 2022-12-29 NOTE — PROGRESS NOTES
Pharmacokinetic Assessment Follow Up: IV Vancomycin    Vancomycin serum concentration assessment(s):    The trough level was drawn correctly and can be used to guide therapy at this time. The measurement is below the desired definitive target range of 10 to 15 mcg/mL.    Vancomycin Regimen Plan:    Change regimen to Vancomycin 1250 mg IV every 12 hours with next serum trough concentration measured at 1100 prior to 4th dose on 12/31    Drug levels (last 3 results):  Recent Labs   Lab Result Units 12/29/22  1059   Vancomycin-Trough ug/mL 8.9*       Pharmacy will continue to follow and monitor vancomycin.    Please contact pharmacy at extension [phone] for questions regarding this assessment.    Thank you for the consult,   Paz Cortez       Patient brief summary:  Ross Crystal is a 57 y.o. male initiated on antimicrobial therapy with IV Vancomycin for treatment of skin & soft tissue infection    The patient's current regimen is vancomycin 1000 mg IV q 12 hours    Drug Allergies:   Review of patient's allergies indicates:   Allergen Reactions    Bactrim [sulfamethoxazole-trimethoprim] Rash       Actual Body Weight:   72.9 kg    Renal Function:   Estimated Creatinine Clearance: 100.7 mL/min (based on SCr of 0.7 mg/dL).,     Dialysis Method (if applicable):  N/A    CBC (last 72 hours):  Recent Labs   Lab Result Units 12/27/22 2002 12/28/22  1027 12/28/22  1701   WBC K/uL 15.95*  --   --    Hemoglobin g/dL 14.7  --   --    Hemoglobin A1C %  --  9.0* 9.0*   Hematocrit % 43.8  --   --    Platelets K/uL 259  --   --    Gran % % 68.5  --   --    Lymph % % 22.1  --   --    Mono % % 6.7  --   --    Eosinophil % % 1.9  --   --    Basophil % % 0.3  --   --    Differential Method  Automated  --   --        Metabolic Panel (last 72 hours):  Recent Labs   Lab Result Units 12/27/22 2035 12/28/22  1048   Sodium mmol/L 136 137   Potassium mmol/L 3.8 4.4   Chloride mmol/L 101 101   CO2 mmol/L 29 27   Glucose mg/dL 236* 404*   BUN  mg/dL 20 15   Creatinine mg/dL 0.86 0.70   Albumin g/dL 4.3  --    Total Bilirubin mg/dL 0.4  --    Alkaline Phosphatase U/L 78  --    AST U/L 23  --    ALT U/L 24  --        Vancomycin Administrations:  vancomycin given in the last 96 hours                     vancomycin in dextrose 5 % 1 gram/250 mL IVPB 1,000 mg (mg) 1,000 mg New Bag 12/29/22 1107     1,000 mg New Bag  0000    vancomycin in dextrose 5 % 1 gram/250 mL IVPB 1,000 mg (mg) 1,000 mg New Bag 12/28/22 1207    vancomycin in dextrose 5 % 1 gram/250 mL IVPB (mg) 2,000 mg New Bag 12/27/22 2200                    Microbiologic Results:  Microbiology Results (last 7 days)       Procedure Component Value Units Date/Time    Blood culture [[id number]] Collected: 12/28/22 1701    Order Status: Completed Specimen: Blood Updated: 12/29/22 0915     Blood Culture, Routine No Growth to date    Narrative:      Blood culture x 2

## 2022-12-29 NOTE — H&P (VIEW-ONLY)
Subjective:      Patient ID: Ross Crystal is a 57 y.o. male.    Chief Complaint: No chief complaint on file.      HPI  Ross Crystal is a  57 y.o. male presenting today for left hand pain and swelling after previous puncture wound.  There was a history of trauma.  Onset of symptoms began yesterday morning when he stuck a fish hook into his left small finger   He had rather rapid onset of pain and swelling including redness which radiate up and down the left forearm  He was seen at [facility] yesterday and started on IV antibiotics   At that time he was having some fever chills he does report that feels better no further fever chills and pain in the arm is better   Reporting pain in the left hand and small finger.      Review of patient's allergies indicates:   Allergen Reactions    Bactrim [sulfamethoxazole-trimethoprim] Rash         Current Facility-Administered Medications   Medication Dose Route Frequency Provider Last Rate Last Admin    0.9%  NaCl infusion   Intravenous PRN Gege Luis MD 5 mL/hr at 12/28/22 1731 New Bag at 12/28/22 1731    cefepime in dextrose 5 % 1 gram/50 mL IVPB 1 g  1 g Intravenous Q8H Gege Luis MD   1 g at 12/28/22 1732    dextrose 10% bolus 125 mL 125 mL  12.5 g Intravenous PRN Gege Luis MD        dextrose 10% bolus 250 mL 250 mL  25 g Intravenous PRN Gege Luis MD        glucagon (human recombinant) injection 1 mg  1 mg Intramuscular PRN Gege Luis MD        glucose chewable tablet 16 g  16 g Oral PRN Gege Luis MD        glucose chewable tablet 24 g  24 g Oral PRN Gege Luis MD        heparin (porcine) injection 5,000 Units  5,000 Units Subcutaneous Q8H Gege Luis MD        HYDROcodone-acetaminophen  mg per tablet 1 tablet  1 tablet Oral Q6H PRN Gege Luis MD   1 tablet at 12/28/22 1742    insulin aspart U-100 pen 0-5 Units  0-5 Units  "Subcutaneous QID (AC + HS) PRN Gege Luis MD   2 Units at 12/28/22 1733    [START ON 12/29/2022] insulin aspart U-100 pen 3 Units  3 Units Subcutaneous TIDWM Gege Luis MD        insulin detemir U-100 pen 15 Units  15 Units Subcutaneous BID Gege Luis MD        mupirocin 2 % ointment   Nasal BID Gege Luis MD        pravastatin tablet 40 mg  40 mg Oral QHS Gege Luis MD        sodium chloride 0.9% flush 10 mL  10 mL Intravenous Q12H PRN Gege Luis MD        [START ON 12/29/2022] tamsulosin 24 hr capsule 0.4 mg  0.4 mg Oral Daily Gege Luis MD        vancomycin - pharmacy to dose   Intravenous pharmacy to manage frequency Gege Luis MD        [START ON 12/29/2022] vancomycin in dextrose 5 % 1 gram/250 mL IVPB 1,000 mg  1,000 mg Intravenous Q12H Gege Luis MD           Past Medical History:   Diagnosis Date    Benign paroxysmal vertigo, bilateral     Diabetes mellitus     Diabetes mellitus type I     Hyperlipemia     Renal disorder     Urinary tract infection        Past Surgical History:   Procedure Laterality Date    CARPAL TUNNEL RELEASE  25 YEARS AGO    COLONOSCOPY      COLONOSCOPY N/A 1/25/2016    Procedure: COLONOSCOPY;  Surgeon: Luis Bogran-Reyes, MD;  Location: [facility];  Service: Endoscopy;  Laterality: N/A;    ESOPHAGOGASTRODUODENOSCOPY      HERNIA REPAIR      HERNIA REPAIR         Review of Systems:  ROS    OBJECTIVE:     PHYSICAL EXAM:  Height: 5' 2" (157.5 cm) Weight: 71 kg (156 lb 8.4 oz)  Vitals:    12/28/22 1525 12/28/22 1742   BP: 130/66    Pulse: 70    Resp: 18 18   Temp: 98 °F (36.7 °C)    TempSrc: Oral    SpO2: 95%    Weight: 71 kg (156 lb 8.4 oz)    Height: 5' 2" (1.575 m)      Well developed, well nourished male in no acute distress  Alert and oriented x 3  HEENT- Normal exam  Lungs- Clear to auscultation  Heart- Regular rate and rhythm  Abdomen- Soft " nontender  Extremity exam- examination left hand there is moderate swelling some redness of the small finger and tenderness of the small finger there appears to be a puncture wound on the ulnar side of the PIP joint.  There is no abscess no drainage noted but diffuse tenderness is noted both volar and dorsal  He is able to tolerate some flexion and extension of the digit there is no tenderness in the palm along the flexor tendon sheath   Sensation intact   There is some red streaking left forearm along the ulnar side    RADIOGRAPHS:  AP lateral x-ray left hand showed no retained foreign body or other abnormalities  Comments: I have personally reviewed the imaging and I agree with the above radiologist's report.    ASSESSMENT/PLAN:     IMPRESSION:  1. Puncture wound left small finger with infection.      2. Cellulitis left forearm    PLAN:  I explained the nature of the problem to the patient   I agree with antibiotic coverage for Gram-negative organisms   We will follow closely   We will keep the patient NPO after midnight in case surgery is indicated tomorrow however if symptoms improve we may just treat this non operative       - We talked at length about the anatomy and pathophysiology of @DX@        Disclaimer: This note has been generated using voice-recognition software. There may be typographical errors that have been missed during proof-reading.

## 2022-12-29 NOTE — INTERVAL H&P NOTE
The patient has been examined and the H&P has been reviewed:    I concur with the findings and no changes have occurred since H&P was written.    Surgery risks, benefits and alternative options discussed and understood by patient/family.          Active Hospital Problems    Diagnosis  POA    *Cellulitis of left little finger [L03.012]  Yes    Cellulitis of left upper extremity [L03.114]  Yes    Leukocytosis [D72.829]  Yes    Benign prostatic hyperplasia without urinary obstruction [N40.0]  Yes    Chronic pain syndrome [G89.4]  Yes    Essential hypertension [I10]  Yes    Long term current use of insulin [Z79.4]  Not Applicable    Type 2 diabetes mellitus without complication [E11.9]  Yes    Tobacco abuse [Z72.0]  Yes    Opioid dependence [F11.20]  Yes    Pure hypercholesterolemia [E78.00]  Yes      Resolved Hospital Problems   No resolved problems to display.

## 2022-12-29 NOTE — PROGRESS NOTES
[facility]  [facility] Note    Patient Name: Ross Crystal  MRN: [id number]  Admission Date: 12/28/2022  Hospital Length of Stay: 1 days  Attending Provider: Gege Luis*  Primary Care Provider: Ross Valverde MD  Follow-up For: Procedure(s) (LRB):  DEBRIDEMENT, PHALANX, HAND (Left)    Post-Operative Day:    Subjective:     Principal Problem:Cellulitis of left little finger    Principal Orthopedic Problem:  Infection possible foreign body left small finger    Interval History:  Left hand improved slightly but still red painful motion  Patient is convinced he has a foreign body related to a fishing injury several days ago    Review of patient's allergies indicates:   Allergen Reactions    Bactrim [sulfamethoxazole-trimethoprim] Rash       Current Facility-Administered Medications   Medication    0.9%  NaCl infusion    cefepime in dextrose 5 % 1 gram/50 mL IVPB 1 g    dextrose 10% bolus 125 mL 125 mL    dextrose 10% bolus 250 mL 250 mL    glucagon (human recombinant) injection 1 mg    glucose chewable tablet 16 g    glucose chewable tablet 24 g    heparin (porcine) injection 5,000 Units    HYDROcodone-acetaminophen  mg per tablet 1 tablet    insulin aspart U-100 pen 0-5 Units    insulin aspart U-100 pen 3 Units    insulin detemir U-100 pen 15 Units    mupirocin 2 % ointment    nicotine 21 mg/24 hr 1 patch    pravastatin tablet 40 mg    sodium chloride 0.9% flush 10 mL    tamsulosin 24 hr capsule 0.4 mg    vancomycin - pharmacy to dose    [START ON 12/30/2022] vancomycin 1.25 g in dextrose 5% 250 mL IVPB (ready to mix)     Objective:     Vital Signs (Most Recent):  Temp: 98.1 °F (36.7 °C) (12/29/22 1149)  Pulse: 68 (12/29/22 1149)  Resp: 20 (12/29/22 1149)  BP: 109/84 (12/29/22 1149)  SpO2: 96 % (12/29/22 1149) Vital Signs (24h Range):  Temp:  [97.6 °F (36.4 °C)-98.2 °F (36.8 °C)] 98.1 °F (36.7 °C)  Pulse:  [63-80] 68  Resp:  [17-22] 20  SpO2:  [93 %-96 %] 96 %  BP: (105-137)/(55-84)  "109/84     Weight: 72.9 kg (160 lb 11.5 oz)  Height: 5' 2" (157.5 cm)  Body mass index is 29.4 kg/m².      Intake/Output Summary (Last 24 hours) at 12/29/2022 1200  Last data filed at 12/29/2022 0326  Gross per 24 hour   Intake 300 ml   Output 1150 ml   Net -850 ml       Ortho/SPM Exam examination left hand shows a puncture wound on the ulnar side left small finger mild diffuse tenderness redness swelling range of motion slightly decreased does not appear to involve the extensor or flexor tendon   No drainage    Significant Labs:  Examination    Significant Imaging: I have reviewed and interpreted all pertinent imaging results/findings.    Assessment/Plan:     Active Diagnoses:    Diagnosis Date Noted POA    PRINCIPAL PROBLEM:  Cellulitis of left little finger [L03.012] 12/28/2022 Yes    Cellulitis of left upper extremity [L03.114] 12/28/2022 Yes    Leukocytosis [D72.829] 12/28/2022 Yes    Benign prostatic hyperplasia without urinary obstruction [N40.0] 01/06/2022 Yes    Chronic pain syndrome [G89.4] 01/06/2022 Yes    Essential hypertension [I10] 01/06/2022 Yes    Long term current use of insulin [Z79.4] 01/06/2022 Not Applicable    Type 2 diabetes mellitus without complication [E11.9] 01/06/2022 Yes    Tobacco abuse [Z72.0] 01/06/2022 Yes    Opioid dependence [F11.20] 01/06/2022 Yes    Pure hypercholesterolemia [E78.00] 01/06/2022 Yes      Problems Resolved During this Admission:     Plan:  Although I think foreign body is unlikely since he does have infection we will proceed with I and D of the finger later today continue NPO    Abdon Reid Jr, MD  Orthopedics  [facility] - Med Surg  "

## 2022-12-29 NOTE — TRANSFER OF CARE
"Anesthesia Transfer of Care Note    Patient: Ross Crystal    Procedure(s) Performed: Procedure(s) (LRB):  DEBRIDEMENT, PHALANX, HAND (Left)    Patient location: PACU    Anesthesia Type: general    Transport from OR: Transported from OR on room air with adequate spontaneous ventilation    Post pain: adequate analgesia    Post assessment: no apparent anesthetic complications and tolerated procedure well    Post vital signs: stable    Level of consciousness: awake, alert and oriented    Nausea/Vomiting: no nausea/vomiting    Complications: none    Transfer of care protocol was followed      Last vitals:   Visit Vitals  /84   Pulse 68   Temp 36.7 °C (98.1 °F)   Resp 18   Ht 5' 2" (1.575 m)   Wt 72.9 kg (160 lb 11.5 oz)   SpO2 96%   BMI 29.40 kg/m²     "

## 2022-12-29 NOTE — ANESTHESIA PREPROCEDURE EVALUATION
[facility]  Anesthesia Pre-Operative Evaluation         Patient Name: Ross Crystal  YOB: 1965  MRN: [id number]    SUBJECTIVE:     12/29/2022    Procedure(s) (LRB):  DEBRIDEMENT, PHALANX, HAND (Left)    Ross Crystal is a 57 y.o. male here for Procedure(s) (LRB):  DEBRIDEMENT, PHALANX, HAND (Left)    Drips:     Patient Active Problem List   Diagnosis    Foot pain    Diabetes mellitus type 2, uncontrolled    Mixed hyperlipidemia    Neuropathy    Neuropathic pain    Vertigo    Colon polyps    Calcaneal spur of left foot    Hydrocele, acquired    Orchalgia    Epididymitis, right    Benign prostatic hyperplasia without urinary obstruction    Chronic pain syndrome    Essential hypertension    Long term current use of insulin    Low back pain    Mild recurrent major depression    Opioid dependence    Pure hypercholesterolemia    Tobacco abuse    Type 2 diabetes mellitus without complication    Personal history of colonic polyps    Preoperative examination    Cellulitis of left upper extremity    Leukocytosis    Cellulitis of left little finger       Review of patient's allergies indicates:   Allergen Reactions    Bactrim [sulfamethoxazole-trimethoprim] Rash       No current facility-administered medications on file prior to encounter.     Current Outpatient Medications on File Prior to Encounter   Medication Sig Dispense Refill    aspirin (ECOTRIN) 81 MG EC tablet Take 81 mg by mouth once daily.      fish oil-omega-3 fatty acids 300-1,000 mg capsule Take 1 capsule by mouth 2 (two) times a day.      hydrocodone-acetaminophen 10-325mg (NORCO)  mg Tab Take 1 tablet by mouth 2 (two) times a day.      LEVEMIR FLEXTOUCH U-100 INSULN 100 unit/mL (3 mL) InPn pen Inject 35 Units into the skin 2 (two) times daily.      meclizine (ANTIVERT) 50 MG tablet Take 50 mg  "by mouth 2 (two) times daily.      NOVOLOG FLEXPEN U-100 INSULIN 100 unit/mL (3 mL) InPn pen SMARTSI-12 Unit(s) SUB-Q As Directed      pravastatin (PRAVACHOL) 40 MG tablet Take 40 mg by mouth every evening.      tamsulosin (FLOMAX) 0.4 mg Cap Take 0.4 mg by mouth once daily.      BD ULTRA-FINE JENNY PEN NEEDLE 32 gauge x 5/32" Ndle 4 (four) times daily.      DEXCOM G6  Misc       DEXCOM G6 SENSOR Sadaf SMARTSI Each Topical Every 10 Days      DEXCOM G6 TRANSMITTER Sadaf       insulin syringe-needle U-100 1 mL 31 gauge x 5/16 Syrg          Past Surgical History:   Procedure Laterality Date    CARPAL TUNNEL RELEASE  25 YEARS AGO    COLONOSCOPY      COLONOSCOPY N/A 2016    Procedure: COLONOSCOPY;  Surgeon: Luis Bogran-Reyes, MD;  Location[facility];  Service: Endoscopy;  Laterality: N/A;    ESOPHAGOGASTRODUODENOSCOPY      HERNIA REPAIR      HERNIA REPAIR         Social History     Socioeconomic History    Marital status: Single    Years of education: 9th   Tobacco Use    Smoking status: Every Day     Packs/day: 2.50     Years: 33.00     Pack years: 82.50     Types: Cigarettes     Start date:     Smokeless tobacco: Never    Tobacco comments:     Pt declines referral to Ambulatory Smoking Cessation clinic, stating that he would like to attempt quitting on his own first. Handout provided.   Substance and Sexual Activity    Alcohol use: No     Alcohol/week: 0.0 standard drinks    Drug use: No     Comment: 30YEARS AGO, PATIENT ON PRESCRIBED PAIN MEDICATION.     Sexual activity: Not Currently     Social Determinants of Health     Financial Resource Strain: Low Risk     Difficulty of Paying Living Expenses: Not very hard   Food Insecurity: No Food Insecurity    Worried About Running Out of Food in the Last Year: Never true    Ran Out of Food in the Last Year: Never true   Transportation Needs: No Transportation Needs    Lack of Transportation (Medical): No    Lack of " Transportation (Non-Medical): No   Physical Activity: Inactive    Days of Exercise per Week: 0 days    Minutes of Exercise per Session: 0 min   Stress: No Stress Concern Present    Feeling of Stress : Only a little   Social Connections: Socially Isolated    Frequency of Communication with Friends and Family: More than three times a week    Frequency of Social Gatherings with Friends and Family: More than three times a week    Attends Catholic Services: Never    Active Member of Clubs or Organizations: No    Attends Club or Organization Meetings: Never    Marital Status: Never    Housing Stability: Unknown    Unable to Pay for Housing in the Last Year: No    Unstable Housing in the Last Year: No         OBJECTIVE:     Vital Signs Range (Last 24H):  Temp:  [36.4 °C (97.6 °F)-36.8 °C (98.2 °F)] 36.7 °C (98.1 °F)  Pulse:  [63-80] 68  Resp:  [17-22] 20  SpO2:  [93 %-96 %] 96 %  BP: (105-137)/(55-84) 109/84    Significant Labs:  Lab Results   Component Value Date    WBC 15.95 (H) 12/27/2022    HGB 14.7 12/27/2022    HCT 43.8 12/27/2022     12/27/2022    CHOL 195 09/26/2022    TRIG 99 09/26/2022    HDL 47 09/26/2022    ALT 24 12/27/2022    AST 23 12/27/2022     12/28/2022    K 4.4 12/28/2022     12/28/2022    CREATININE 0.70 12/28/2022    BUN 15 12/28/2022    CO2 27 12/28/2022    TSH 2.770 09/26/2022    PSA 0.94 10/19/2015    INR 1.0 02/22/2018    HGBA1C 9.0 (H) 12/28/2022       Diagnostic Studies:    EKG:   Results for orders placed or performed during the hospital encounter of 02/22/18   EKG 12-lead    Collection Time: 02/22/18  1:28 PM    Narrative    Test Reason : R07.9  Vent. Rate : 080 BPM     Atrial Rate : 080 BPM     P-R Int : 142 ms          QRS Dur : 094 ms      QT Int : 368 ms       P-R-T Axes : 065 062 038 degrees     QTc Int : 424 ms    Normal sinus rhythm  Normal ECG  When compared with ECG of 17-JUL-2016 17:39,  No significant change was found  Confirmed by Sancho BURTON,  Jaret ([id number]) on 2/23/2018 9:14:12 AM    Referred By: VÍCTOR SILVERIO           Confirmed By:Jaret Kingsley MD         Pre-op Assessment    I have reviewed the Patient Summary Reports.     I have reviewed the Nursing Notes. I have reviewed the NPO Status.   I have reviewed the Medications.     Review of Systems  Anesthesia Hx:  Pt reports that he doesn't like how anesthesia makes him feel.   History of prior surgery of interest to airway management or planning:  Denies Personal Hx of Anesthesia complications.   Social:  Smoker, No Alcohol Use 2 PPD smoker   Hematology/Oncology:  Hematology Normal   Oncology Normal     EENT/Dental:EENT/Dental Normal   Cardiovascular:   Exercise tolerance: good Hypertension hyperlipidemia    Pulmonary:  Pulmonary Normal  Denies COPD.  Denies Asthma.  Denies Sleep Apnea.    Renal/:   BPH    Hepatic/GI:  Hepatic/GI Normal  Denies GERD.    Musculoskeletal:   Skin over Left hand erythematous, warm, and painful   Neurological:  Neurology Normal Denies TIA.  Denies CVA. Denies Seizures.    Endocrine:   Diabetes, poorly controlled, using insulin Denies Hypothyroidism. Denies Hyperthyroidism.    Psych:   depression          Physical Exam  General: Well nourished, Alert, Oriented and Cooperative    Airway:  Mallampati: III / II  Mouth Opening: Normal  TM Distance: Normal  Tongue: Normal  Neck ROM: Normal ROM    Dental:  Edentulous, Dentures    Chest/Lungs:  Clear to auscultation, Normal Respiratory Rate    Heart:  Rate: Normal  Rhythm: Regular Rhythm        Anesthesia Plan  Type of Anesthesia, risks & benefits discussed:    Anesthesia Type: Gen Natural Airway, MAC  Intra-op Monitoring Plan: Standard ASA Monitors  Post Op Pain Control Plan: multimodal analgesia and IV/PO Opioids PRN  Induction:  IV  Informed Consent: Informed consent signed with the Patient and all parties understand the risks and agree with anesthesia plan.  All questions answered.   ASA Score: 3  Day of Surgery Review of  History & Physical: H&P Update referred to the surgeon/provider.    Ready For Surgery From Anesthesia Perspective.     .

## 2022-12-29 NOTE — OP NOTE
[facility] Surg  Surgery Department  Operative Note    SUMMARY     Date of Procedure: 12/29/2022     Procedure: Procedure(s) (LRB):  DEBRIDEMENT, PHALANX, HAND (Left)     Surgeon(s) and Role:     * Abdon Reid Jr., MD - Primary    Assisting Surgeon: None    Pre-Operative Diagnosis: Cellulitis of left little finger [L03.012]    Post-Operative Diagnosis: Post-Op Diagnosis Codes:     * Cellulitis of left little finger [L03.012]    Anesthesia: Local MAC    Operative Findings (including complications, if any):  Septic flexor tenosynovitis left small finger    Description of Technical Procedures:     Preop diagnosis: Septic flexor tenosynovitis left small finger.      Postop diagnosis: Same.      Operative procedure:  Excisional debridement of flexor tenosynovitis left small finger.      Surgeon: Franklin.      Anesthesia:  Mac.    .   EBL:  Minimal    Complications:  None.      Specimen:  Cultures.      Operative procedure in detail as follows:     After operative consent the patient brought the operating room placed supine operating table.  Anesthesia by IV sedation followed by injection of xylocaine Marcaine combination at the base of the left small finger performing a digital block.  Tourniquet applied to the left arm left upper extremity prepped and draped out in the normal sterile fashion.  The Esmarch used to exsanguinate the limb and tourniquet inflated to 225 mmHg.  Following this an ulnar side elliptical incision made with the 15 blade removing the puncture wound on the ulnar side of the small finger.  Deep dissection used to expose some purulence which was cultured and dissection continued down into the flexor tendon sheath which was involved.  The sheath and the wound thoroughly irrigated with antibiotic saline solution.  Excisional debridement performed with the scissors and scalpel down to the level of the flexor tendon sheath.  There was no foreign body but the wound was thoroughly irrigated and some  black material was removed.  After thorough debridement and removal of all pus the wound was packed with iodoform gauze closed loosely with 5 0 nylon suture sterile dressing applied followed by light wrap.  Tourniquet deflated patient brought to the recovery room in stable condition all sponge needle counts reported as correct no complications    Significant Surgical Tasks Conducted by the Assistant(s), if Applicable: na    Estimated Blood Loss (EBL): * No values recorded between 12/29/2022  4:54 PM and 12/29/2022  5:26 PM *           Implants: * No implants in log *    Specimens:   Specimen (24h ago, onward)      None                    Condition: Good    Disposition: PACU - hemodynamically stable.    Attestation: I was present and scrubbed for the entire procedure.

## 2022-12-29 NOTE — ASSESSMENT & PLAN NOTE
Cellulitis of left upper extremity  Blood cx NGTD  started on Vanc/ Cefepime- continue   Pain control  Elevated Left upper arm  Consult ortho

## 2022-12-29 NOTE — CONSULTS
Subjective:      Patient ID: Ross Crystal is a 57 y.o. male.    Chief Complaint: No chief complaint on file.      HPI  Ross Crystal is a  57 y.o. male presenting today for left hand pain and swelling after previous puncture wound.  There was a history of trauma.  Onset of symptoms began yesterday morning when he stuck a fish hook into his left small finger   He had rather rapid onset of pain and swelling including redness which radiate up and down the left forearm  He was seen at [facility] yesterday and started on IV antibiotics   At that time he was having some fever chills he does report that feels better no further fever chills and pain in the arm is better   Reporting pain in the left hand and small finger.      Review of patient's allergies indicates:   Allergen Reactions    Bactrim [sulfamethoxazole-trimethoprim] Rash         Current Facility-Administered Medications   Medication Dose Route Frequency Provider Last Rate Last Admin    0.9%  NaCl infusion   Intravenous PRN Gege Luis MD 5 mL/hr at 12/28/22 1731 New Bag at 12/28/22 1731    cefepime in dextrose 5 % 1 gram/50 mL IVPB 1 g  1 g Intravenous Q8H Gege Luis MD   1 g at 12/28/22 1732    dextrose 10% bolus 125 mL 125 mL  12.5 g Intravenous PRN Gege Luis MD        dextrose 10% bolus 250 mL 250 mL  25 g Intravenous PRN Gege Luis MD        glucagon (human recombinant) injection 1 mg  1 mg Intramuscular PRN Gege Luis MD        glucose chewable tablet 16 g  16 g Oral PRN Gege Luis MD        glucose chewable tablet 24 g  24 g Oral PRN Gege Luis MD        heparin (porcine) injection 5,000 Units  5,000 Units Subcutaneous Q8H Gege Luis MD        HYDROcodone-acetaminophen  mg per tablet 1 tablet  1 tablet Oral Q6H PRN Gege Luis MD   1 tablet at 12/28/22 1742    insulin aspart U-100 pen 0-5 Units  0-5 Units  "Subcutaneous QID (AC + HS) PRN Gege Luis MD   2 Units at 12/28/22 1733    [START ON 12/29/2022] insulin aspart U-100 pen 3 Units  3 Units Subcutaneous TIDWM Gege Luis MD        insulin detemir U-100 pen 15 Units  15 Units Subcutaneous BID Gege Luis MD        mupirocin 2 % ointment   Nasal BID Gege Luis MD        pravastatin tablet 40 mg  40 mg Oral QHS Gege Luis MD        sodium chloride 0.9% flush 10 mL  10 mL Intravenous Q12H PRN Gege Luis MD        [START ON 12/29/2022] tamsulosin 24 hr capsule 0.4 mg  0.4 mg Oral Daily Gege Luis MD        vancomycin - pharmacy to dose   Intravenous pharmacy to manage frequency Gege Luis MD        [START ON 12/29/2022] vancomycin in dextrose 5 % 1 gram/250 mL IVPB 1,000 mg  1,000 mg Intravenous Q12H Gege Luis MD           Past Medical History:   Diagnosis Date    Benign paroxysmal vertigo, bilateral     Diabetes mellitus     Diabetes mellitus type I     Hyperlipemia     Renal disorder     Urinary tract infection        Past Surgical History:   Procedure Laterality Date    CARPAL TUNNEL RELEASE  25 YEARS AGO    COLONOSCOPY      COLONOSCOPY N/A 1/25/2016    Procedure: COLONOSCOPY;  Surgeon: Luis Bogran-Reyes, MD;  Location: [facility];  Service: Endoscopy;  Laterality: N/A;    ESOPHAGOGASTRODUODENOSCOPY      HERNIA REPAIR      HERNIA REPAIR         Review of Systems:  ROS    OBJECTIVE:     PHYSICAL EXAM:  Height: 5' 2" (157.5 cm) Weight: 71 kg (156 lb 8.4 oz)  Vitals:    12/28/22 1525 12/28/22 1742   BP: 130/66    Pulse: 70    Resp: 18 18   Temp: 98 °F (36.7 °C)    TempSrc: Oral    SpO2: 95%    Weight: 71 kg (156 lb 8.4 oz)    Height: 5' 2" (1.575 m)      Well developed, well nourished male in no acute distress  Alert and oriented x 3  HEENT- Normal exam  Lungs- Clear to auscultation  Heart- Regular rate and rhythm  Abdomen- Soft " nontender  Extremity exam- examination left hand there is moderate swelling some redness of the small finger and tenderness of the small finger there appears to be a puncture wound on the ulnar side of the PIP joint.  There is no abscess no drainage noted but diffuse tenderness is noted both volar and dorsal  He is able to tolerate some flexion and extension of the digit there is no tenderness in the palm along the flexor tendon sheath   Sensation intact   There is some red streaking left forearm along the ulnar side    RADIOGRAPHS:  AP lateral x-ray left hand showed no retained foreign body or other abnormalities  Comments: I have personally reviewed the imaging and I agree with the above radiologist's report.    ASSESSMENT/PLAN:     IMPRESSION:  1. Puncture wound left small finger with infection.      2. Cellulitis left forearm    PLAN:  I explained the nature of the problem to the patient   I agree with antibiotic coverage for Gram-negative organisms   We will follow closely   We will keep the patient NPO after midnight in case surgery is indicated tomorrow however if symptoms improve we may just treat this non operative       - We talked at length about the anatomy and pathophysiology of @DX@        Disclaimer: This note has been generated using voice-recognition software. There may be typographical errors that have been missed during proof-reading.

## 2022-12-29 NOTE — HPI
Bonilla Rowna is a 58 y/o M with PM H of DM II, hyperlipidemia, hypertension, BPH, went to ED yesterday with few hours history of his left pinky pain, pin was abou 10/10 throbbing and radiating to the upper arm,  there is associated redness, swelling, headache and chills. Patient reported he had a fishhook stuck in her finger yesterday morning and attempted to pull it out with a knife without success and think part of it was still left in the finger. He denies fever, nausea, vomiting, weakness, numbness.   In the ED Xray of the 5 th finger did not show no radiopaque foreign body, WBC elevated, BG elevated. Transferred to [facility] for orthopedic evaluation and treatment

## 2022-12-29 NOTE — SUBJECTIVE & OBJECTIVE
Interval History:  Awake and alert, pacing the room left upper extremity redness much improved left pinky swelling and redness improved.  Labs pending this morning  Awaiting ortho eval and recs   Patient reported 2-3 packs of cigarette daily times 30 years and now open to nicotine patch    Review of Systems   Constitutional:  Negative for activity change, chills and fever.   Respiratory:  Negative for shortness of breath.    Gastrointestinal:  Negative for abdominal distention, blood in stool, nausea and vomiting.   Genitourinary:  Negative for dysuria and urgency.   Musculoskeletal:  Positive for arthralgias, joint swelling and myalgias.   Skin:  Positive for color change, rash and wound.   Neurological:  Negative for dizziness, tremors, weakness, light-headedness and headaches.   Psychiatric/Behavioral:  Negative for agitation.    Objective:     Vital Signs (Most Recent):  Temp: 98.1 °F (36.7 °C) (12/29/22 1149)  Pulse: 68 (12/29/22 1149)  Resp: 20 (12/29/22 1149)  BP: 109/84 (12/29/22 1149)  SpO2: 96 % (12/29/22 1149) Vital Signs (24h Range):  Temp:  [97.6 °F (36.4 °C)-98.2 °F (36.8 °C)] 98.1 °F (36.7 °C)  Pulse:  [63-80] 68  Resp:  [17-22] 20  SpO2:  [93 %-96 %] 96 %  BP: (105-137)/(55-84) 109/84     Weight: 72.9 kg (160 lb 11.5 oz)  Body mass index is 29.4 kg/m².    Intake/Output Summary (Last 24 hours) at 12/29/2022 1259  Last data filed at 12/29/2022 0326  Gross per 24 hour   Intake 300 ml   Output 1150 ml   Net -850 ml      Physical Exam  Constitutional:       General: He is in acute distress.      Appearance: Normal appearance.   HENT:      Head: Normocephalic and atraumatic.   Cardiovascular:      Rate and Rhythm: Normal rate.      Pulses: Normal pulses.   Pulmonary:      Effort: Pulmonary effort is normal.   Abdominal:      General: Bowel sounds are normal.      Palpations: Abdomen is soft.      Tenderness: There is no abdominal tenderness.   Musculoskeletal:         General: Normal range of motion.       Cervical back: Normal range of motion and neck supple.   Skin:     General: Skin is warm.      Findings: Erythema and rash present.      Comments: Erythema of left little finger extending to flexor surface of the UE up to the axilla resolved.  Left pinky swelling and eryrthematous   Neurological:      General: No focal deficit present.      Mental Status: He is alert and oriented to person, place, and time.   Psychiatric:         Mood and Affect: Mood normal.         Behavior: Behavior normal.       Significant Labs: A1C:   Recent Labs   Lab 09/26/22  0621 12/28/22  1027 12/28/22  1701   HGBA1C 7.9* 9.0* 9.0*     ABGs: No results for input(s): PH, PCO2, HCO3, POCSATURATED, BE, TOTALHB, COHB, METHB, O2HB, POCFIO2, PO2 in the last 48 hours.  Blood Culture:   Recent Labs   Lab 12/28/22  1701   LABBLOO No Growth to date     CBC:   Recent Labs   Lab 12/27/22 2002   WBC 15.95*   HGB 14.7   HCT 43.8        CMP:   Recent Labs   Lab 12/27/22 2035 12/28/22  1048    137   K 3.8 4.4    101   CO2 29 27   * 404*   BUN 20 15   CREATININE 0.86 0.70   CALCIUM 9.2 9.0   PROT 7.2  --    ALBUMIN 4.3  --    BILITOT 0.4  --    ALKPHOS 78  --    AST 23  --    ALT 24  --    ANIONGAP 6* 9     Lactic Acid: No results for input(s): LACTATE in the last 48 hours.  Lipase: No results for input(s): LIPASE in the last 48 hours.  Lipid Panel: No results for input(s): CHOL, HDL, LDLCALC, TRIG, CHOLHDL in the last 48 hours.  Magnesium: No results for input(s): MG in the last 48 hours.  Troponin: No results for input(s): TROPONINI, TROPONINIHS in the last 48 hours.  TSH:   Recent Labs   Lab 09/26/22  0621   TSH 2.770     Urine Culture: No results for input(s): LABURIN in the last 48 hours.  Urine Studies: No results for input(s): COLORU, APPEARANCEUA, PHUR, SPECGRAV, PROTEINUA, GLUCUA, KETONESU, BILIRUBINUA, OCCULTUA, NITRITE, UROBILINOGEN, LEUKOCYTESUR, RBCUA, WBCUA, BACTERIA, SQUAMEPITHEL, HYALINECASTS in the last 48  hours.    Invalid input(s): OLIVE    Significant Imaging: I have reviewed all pertinent imaging results/findings within the past 24 hours.

## 2022-12-30 LAB
BASOPHILS # BLD AUTO: 0.04 K/UL (ref 0–0.2)
BASOPHILS NFR BLD: 0.4 % (ref 0–1.9)
DIFFERENTIAL METHOD: ABNORMAL
EOSINOPHIL # BLD AUTO: 0.3 K/UL (ref 0–0.5)
EOSINOPHIL NFR BLD: 3 % (ref 0–8)
ERYTHROCYTE [DISTWIDTH] IN BLOOD BY AUTOMATED COUNT: 12.6 % (ref 11.5–14.5)
GRAM STN SPEC: NORMAL
GRAM STN SPEC: NORMAL
HCT VFR BLD AUTO: 40.7 % (ref 40–54)
HGB BLD-MCNC: 13.5 G/DL (ref 14–18)
IMM GRANULOCYTES # BLD AUTO: 0.06 K/UL (ref 0–0.04)
IMM GRANULOCYTES NFR BLD AUTO: 0.6 % (ref 0–0.5)
LYMPHOCYTES # BLD AUTO: 2.1 K/UL (ref 1–4.8)
LYMPHOCYTES NFR BLD: 20.2 % (ref 18–48)
MCH RBC QN AUTO: 28.6 PG (ref 27–31)
MCHC RBC AUTO-ENTMCNC: 33.2 G/DL (ref 32–36)
MCV RBC AUTO: 86 FL (ref 82–98)
MONOCYTES # BLD AUTO: 0.7 K/UL (ref 0.3–1)
MONOCYTES NFR BLD: 6.7 % (ref 4–15)
NEUTROPHILS # BLD AUTO: 7.3 K/UL (ref 1.8–7.7)
NEUTROPHILS NFR BLD: 69.1 % (ref 38–73)
NRBC BLD-RTO: 0 /100 WBC
PLATELET # BLD AUTO: 229 K/UL (ref 150–450)
PMV BLD AUTO: 9.2 FL (ref 9.2–12.9)
POCT GLUCOSE: 250 MG/DL (ref 70–110)
POCT GLUCOSE: 285 MG/DL (ref 70–110)
POCT GLUCOSE: 302 MG/DL (ref 70–110)
POCT GLUCOSE: 322 MG/DL (ref 70–110)
RBC # BLD AUTO: 4.72 M/UL (ref 4.6–6.2)
WBC # BLD AUTO: 10.47 K/UL (ref 3.9–12.7)

## 2022-12-30 PROCEDURE — 36415 COLL VENOUS BLD VENIPUNCTURE: CPT | Performed by: FAMILY MEDICINE

## 2022-12-30 PROCEDURE — 85025 COMPLETE CBC W/AUTO DIFF WBC: CPT | Performed by: FAMILY MEDICINE

## 2022-12-30 PROCEDURE — 99900035 HC TECH TIME PER 15 MIN (STAT)

## 2022-12-30 PROCEDURE — 63600175 PHARM REV CODE 636 W HCPCS: Performed by: NURSE PRACTITIONER

## 2022-12-30 PROCEDURE — S4991 NICOTINE PATCH NONLEGEND: HCPCS | Performed by: ORTHOPAEDIC SURGERY

## 2022-12-30 PROCEDURE — 11000001 HC ACUTE MED/SURG PRIVATE ROOM

## 2022-12-30 PROCEDURE — 25000003 PHARM REV CODE 250: Performed by: ORTHOPAEDIC SURGERY

## 2022-12-30 PROCEDURE — 25000003 PHARM REV CODE 250: Performed by: FAMILY MEDICINE

## 2022-12-30 PROCEDURE — 94761 N-INVAS EAR/PLS OXIMETRY MLT: CPT

## 2022-12-30 PROCEDURE — 63600175 PHARM REV CODE 636 W HCPCS: Performed by: ORTHOPAEDIC SURGERY

## 2022-12-30 PROCEDURE — 63600175 PHARM REV CODE 636 W HCPCS: Performed by: FAMILY MEDICINE

## 2022-12-30 RX ORDER — INSULIN ASPART 100 [IU]/ML
10 INJECTION, SOLUTION INTRAVENOUS; SUBCUTANEOUS ONCE
Status: COMPLETED | OUTPATIENT
Start: 2022-12-30 | End: 2022-12-30

## 2022-12-30 RX ORDER — OXYCODONE AND ACETAMINOPHEN 7.5; 325 MG/1; MG/1
1 TABLET ORAL EVERY 4 HOURS PRN
Qty: 20 TABLET | Refills: 0 | Status: SHIPPED | OUTPATIENT
Start: 2022-12-30 | End: 2023-01-18

## 2022-12-30 RX ORDER — INSULIN ASPART 100 [IU]/ML
5 INJECTION, SOLUTION INTRAVENOUS; SUBCUTANEOUS
Status: DISCONTINUED | OUTPATIENT
Start: 2022-12-30 | End: 2022-12-31 | Stop reason: HOSPADM

## 2022-12-30 RX ORDER — AMOXICILLIN AND CLAVULANATE POTASSIUM 875; 125 MG/1; MG/1
1 TABLET, FILM COATED ORAL 2 TIMES DAILY
Qty: 20 TABLET | Refills: 1 | Status: SHIPPED | OUTPATIENT
Start: 2022-12-30 | End: 2023-01-18 | Stop reason: ALTCHOICE

## 2022-12-30 RX ADMIN — INSULIN ASPART 3 UNITS: 100 INJECTION, SOLUTION INTRAVENOUS; SUBCUTANEOUS at 07:12

## 2022-12-30 RX ADMIN — INSULIN ASPART 4 UNITS: 100 INJECTION, SOLUTION INTRAVENOUS; SUBCUTANEOUS at 11:12

## 2022-12-30 RX ADMIN — HEPARIN SODIUM 5000 UNITS: 5000 INJECTION INTRAVENOUS; SUBCUTANEOUS at 05:12

## 2022-12-30 RX ADMIN — HYDROCODONE BITARTRATE AND ACETAMINOPHEN 1 TABLET: 10; 325 TABLET ORAL at 05:12

## 2022-12-30 RX ADMIN — INSULIN ASPART 5 UNITS: 100 INJECTION, SOLUTION INTRAVENOUS; SUBCUTANEOUS at 11:12

## 2022-12-30 RX ADMIN — INSULIN DETEMIR 15 UNITS: 100 INJECTION, SOLUTION SUBCUTANEOUS at 07:12

## 2022-12-30 RX ADMIN — MUPIROCIN: 20 OINTMENT TOPICAL at 07:12

## 2022-12-30 RX ADMIN — CEFEPIME HYDROCHLORIDE 1 G: 1 INJECTION, SOLUTION INTRAVENOUS at 04:12

## 2022-12-30 RX ADMIN — INSULIN ASPART 5 UNITS: 100 INJECTION, SOLUTION INTRAVENOUS; SUBCUTANEOUS at 05:12

## 2022-12-30 RX ADMIN — HEPARIN SODIUM 5000 UNITS: 5000 INJECTION INTRAVENOUS; SUBCUTANEOUS at 03:12

## 2022-12-30 RX ADMIN — INSULIN ASPART 3 UNITS: 100 INJECTION, SOLUTION INTRAVENOUS; SUBCUTANEOUS at 05:12

## 2022-12-30 RX ADMIN — PRAVASTATIN SODIUM 40 MG: 40 TABLET ORAL at 08:12

## 2022-12-30 RX ADMIN — MUPIROCIN: 20 OINTMENT TOPICAL at 08:12

## 2022-12-30 RX ADMIN — TAMSULOSIN HYDROCHLORIDE 0.4 MG: 0.4 CAPSULE ORAL at 07:12

## 2022-12-30 RX ADMIN — VANCOMYCIN HYDROCHLORIDE 1250 MG: 1.25 INJECTION, POWDER, LYOPHILIZED, FOR SOLUTION INTRAVENOUS at 11:12

## 2022-12-30 RX ADMIN — CEFEPIME HYDROCHLORIDE 1 G: 1 INJECTION, SOLUTION INTRAVENOUS at 07:12

## 2022-12-30 RX ADMIN — HEPARIN SODIUM 5000 UNITS: 5000 INJECTION INTRAVENOUS; SUBCUTANEOUS at 10:12

## 2022-12-30 RX ADMIN — INSULIN ASPART 10 UNITS: 100 INJECTION, SOLUTION INTRAVENOUS; SUBCUTANEOUS at 12:12

## 2022-12-30 RX ADMIN — INSULIN ASPART 4 UNITS: 100 INJECTION, SOLUTION INTRAVENOUS; SUBCUTANEOUS at 05:12

## 2022-12-30 RX ADMIN — INSULIN ASPART 1 UNITS: 100 INJECTION, SOLUTION INTRAVENOUS; SUBCUTANEOUS at 08:12

## 2022-12-30 RX ADMIN — HYDROCODONE BITARTRATE AND ACETAMINOPHEN 1 TABLET: 10; 325 TABLET ORAL at 11:12

## 2022-12-30 RX ADMIN — NICOTINE 1 PATCH: 21 PATCH, EXTENDED RELEASE TRANSDERMAL at 07:12

## 2022-12-30 RX ADMIN — CEFEPIME HYDROCHLORIDE 1 G: 1 INJECTION, SOLUTION INTRAVENOUS at 01:12

## 2022-12-30 NOTE — ASSESSMENT & PLAN NOTE
Cellulitis of left upper extremity  Blood cx NGTD  started on Vanc/ Cefepime- continue   Pain control  Elevated Left upper arm  Consult ortho- appreciate rec's- s/p excisional debridement of flexor tenosynovitis left small finger on 12/29

## 2022-12-30 NOTE — PROGRESS NOTES
[facility] Medicine  Progress Note    Patient Name: oRss Crystal  MRN: [id number]  Patient Class: IP- Inpatient   Admission Date: 12/28/2022  Length of Stay: 2 days  Attending Physician: Gege Luis*  Primary Care Provider: Ross Valverde MD        Subjective:     Principal Problem:Cellulitis of left little finger        HPI:  Bonilla Rowan is a 58 y/o M with PM H of DM II, hyperlipidemia, hypertension, BPH, went to ED yesterday with few hours history of his left pinky pain, pin was abou 10/10 throbbing and radiating to the upper arm,  there is associated redness, swelling, headache and chills. Patient reported he had a fishhook stuck in her finger yesterday morning and attempted to pull it out with a knife without success and think part of it was still left in the finger. He denies fever, nausea, vomiting, weakness, numbness.   In the ED Xray of the 5 th finger did not show no radiopaque foreign body, WBC elevated, BG elevated. Transferred to [facility] for orthopedic evaluation and treatment       Overview/Hospital Course:  No notes on file    Interval History:  Awake and alert, s/p excisional debridement of flexor tenosynovitis left small finger on 12/29.  Continue vanc and cefepime pending wound culture-deescalate antibiotics with culture report  BG improving- continue present regimen      Review of Systems   Constitutional:  Negative for activity change, chills and fever.   Respiratory:  Negative for shortness of breath.    Gastrointestinal:  Negative for abdominal distention, blood in stool, nausea and vomiting.   Genitourinary:  Negative for dysuria and urgency.   Musculoskeletal:  Positive for arthralgias, joint swelling and myalgias.   Skin:  Positive for color change, rash and wound.   Neurological:  Negative for dizziness, tremors, weakness, light-headedness and headaches.   Psychiatric/Behavioral:  Negative for agitation.    Objective:     Vital Signs (Most Recent):  Temp: 98.3  °F (36.8 °C) (12/30/22 0748)  Pulse: 73 (12/30/22 0748)  Resp: 16 (12/30/22 0748)  BP: 126/66 (12/30/22 0748)  SpO2: 95 % (12/30/22 0825) Vital Signs (24h Range):  Temp:  [97.7 °F (36.5 °C)-98.3 °F (36.8 °C)] 98.3 °F (36.8 °C)  Pulse:  [] 73  Resp:  [16-23] 16  SpO2:  [92 %-97 %] 95 %  BP: (109-163)/(59-86) 126/66     Weight: 75 kg (165 lb 5.5 oz)  Body mass index is 30.24 kg/m².    Intake/Output Summary (Last 24 hours) at 12/30/2022 0858  Last data filed at 12/30/2022 0422  Gross per 24 hour   Intake 930 ml   Output 1000 ml   Net -70 ml        Physical Exam  Constitutional:       General: He is not in acute distress.     Appearance: Normal appearance.   HENT:      Head: Normocephalic and atraumatic.   Cardiovascular:      Rate and Rhythm: Normal rate.      Pulses: Normal pulses.   Pulmonary:      Effort: Pulmonary effort is normal.   Abdominal:      General: Bowel sounds are normal.      Palpations: Abdomen is soft.      Tenderness: There is no abdominal tenderness.   Musculoskeletal:         General: Normal range of motion.      Cervical back: Normal range of motion and neck supple.   Skin:     General: Skin is warm.      Findings: Erythema and rash present.      Comments: Left pinky finger dressing clean and dry.      Neurological:      General: No focal deficit present.      Mental Status: He is alert and oriented to person, place, and time.   Psychiatric:         Mood and Affect: Mood normal.         Behavior: Behavior normal.       Significant Labs: A1C:   Recent Labs   Lab 09/26/22  0621 12/28/22  1027 12/28/22  1701   HGBA1C 7.9* 9.0* 9.0*       ABGs: No results for input(s): PH, PCO2, HCO3, POCSATURATED, BE, TOTALHB, COHB, METHB, O2HB, POCFIO2, PO2 in the last 48 hours.  Blood Culture:   Recent Labs   Lab 12/28/22  1701   LABBLOO No Growth to date  No Growth to date       CBC:   Recent Labs   Lab 12/29/22  1316   WBC 11.92   HGB 14.5   HCT 43.2          CMP:   Recent Labs   Lab 12/28/22  1048  12/29/22  1316    136   K 4.4 4.2    103   CO2 27 23   * 149*   BUN 15 12   CREATININE 0.70 0.8   CALCIUM 9.0 9.4   ANIONGAP 9 10       Lactic Acid: No results for input(s): LACTATE in the last 48 hours.  Lipase: No results for input(s): LIPASE in the last 48 hours.  Lipid Panel: No results for input(s): CHOL, HDL, LDLCALC, TRIG, CHOLHDL in the last 48 hours.  Magnesium: No results for input(s): MG in the last 48 hours.  Troponin: No results for input(s): TROPONINI, TROPONINIHS in the last 48 hours.  TSH:   Recent Labs   Lab 09/26/22  0621   TSH 2.770       Urine Culture: No results for input(s): LABURIN in the last 48 hours.  Urine Studies: No results for input(s): COLORU, APPEARANCEUA, PHUR, SPECGRAV, PROTEINUA, GLUCUA, KETONESU, BILIRUBINUA, OCCULTUA, NITRITE, UROBILINOGEN, LEUKOCYTESUR, RBCUA, WBCUA, BACTERIA, SQUAMEPITHEL, HYALINECASTS in the last 48 hours.    Invalid input(s): WRIGHTSUR    Significant Imaging: I have reviewed all pertinent imaging results/findings within the past 24 hours.      Assessment/Plan:      * Cellulitis of left little finger  Cellulitis of left upper extremity  Blood cx NGTD  started on Vanc/ Cefepime- continue   Pain control  Elevated Left upper arm  Consult ortho- appreciate rec's- s/p excisional debridement of flexor tenosynovitis left small finger on 12/29        Type 2 diabetes mellitus without complication  Long term current use of insulin  Patient's FSGs are uncontrolled due to hyperglycemia on current medication regimen.  Last A1c reviewed-   Lab Results   Component Value Date    HGBA1C 9.0 (H) 12/28/2022     Most recent fingerstick glucose reviewed-   Recent Labs   Lab 12/28/22  1943 12/29/22  0451 12/29/22  0535 12/29/22  1143   POCTGLUCOSE 359* 67* 133* 123*     Current correctional scale  High  Maintain anti-hyperglycemic dose as follows-   Antihyperglycemics (From admission, onward)    Start     Stop Route Frequency Ordered    12/29/22 0715  insulin  aspart U-100 pen 3 Units         -- SubQ 3 times daily with meals 12/28/22 1648    12/28/22 2100  insulin detemir U-100 pen 15 Units         -- SubQ 2 times daily 12/28/22 1648    12/28/22 1746  insulin aspart U-100 pen 0-5 Units         -- SubQ Before meals & nightly PRN 12/28/22 1648        Hold Oral hypoglycemics while patient is in the hospital.  Continue Levemir  Add prandial insulin    Tobacco abuse  History of 2-3 packs of cigarette daily times 30 years and now open to nicotine patch  Counseling on smoking cessation  Nicotine patch    Pure hypercholesterolemia  Continue pravachol      Opioid dependence  On long term norco      Essential hypertension  Elevated on admit- likely due to the  Not any medication  BP within normal limits  Continue to monitor    Chronic pain syndrome  On chronic norco      Benign prostatic hyperplasia without urinary obstruction  Continue flomax        VTE Risk Mitigation (From admission, onward)         Ordered     heparin (porcine) injection 5,000 Units  Every 8 hours         12/28/22 1640     IP VTE HIGH RISK PATIENT  Once         12/28/22 1640     Place sequential compression device  Until discontinued         12/28/22 1640                Discharge Planning   NAZ:      Code Status: Full Code   Is the patient medically ready for discharge?:     Reason for patient still in hospital (select all that apply): Patient trending condition  Discharge Plan A: Home, Home with family   Discharge Delays: Orders Needed              Gege Luis MD  Department of Hospital Medicine   [facility]

## 2022-12-30 NOTE — SUBJECTIVE & OBJECTIVE
Interval History:  Awake and alert, s/p excisional debridement of flexor tenosynovitis left small finger on 12/29.  Continue vanc and cefepime pending wound culture-deescalate antibiotics with culture report  BG improving- continue present regimen      Review of Systems   Constitutional:  Negative for activity change, chills and fever.   Respiratory:  Negative for shortness of breath.    Gastrointestinal:  Negative for abdominal distention, blood in stool, nausea and vomiting.   Genitourinary:  Negative for dysuria and urgency.   Musculoskeletal:  Positive for arthralgias, joint swelling and myalgias.   Skin:  Positive for color change, rash and wound.   Neurological:  Negative for dizziness, tremors, weakness, light-headedness and headaches.   Psychiatric/Behavioral:  Negative for agitation.    Objective:     Vital Signs (Most Recent):  Temp: 98.3 °F (36.8 °C) (12/30/22 0748)  Pulse: 73 (12/30/22 0748)  Resp: 16 (12/30/22 0748)  BP: 126/66 (12/30/22 0748)  SpO2: 95 % (12/30/22 0825) Vital Signs (24h Range):  Temp:  [97.7 °F (36.5 °C)-98.3 °F (36.8 °C)] 98.3 °F (36.8 °C)  Pulse:  [] 73  Resp:  [16-23] 16  SpO2:  [92 %-97 %] 95 %  BP: (109-163)/(59-86) 126/66     Weight: 75 kg (165 lb 5.5 oz)  Body mass index is 30.24 kg/m².    Intake/Output Summary (Last 24 hours) at 12/30/2022 0858  Last data filed at 12/30/2022 0422  Gross per 24 hour   Intake 930 ml   Output 1000 ml   Net -70 ml        Physical Exam  Constitutional:       General: He is not in acute distress.     Appearance: Normal appearance.   HENT:      Head: Normocephalic and atraumatic.   Cardiovascular:      Rate and Rhythm: Normal rate.      Pulses: Normal pulses.   Pulmonary:      Effort: Pulmonary effort is normal.   Abdominal:      General: Bowel sounds are normal.      Palpations: Abdomen is soft.      Tenderness: There is no abdominal tenderness.   Musculoskeletal:         General: Normal range of motion.      Cervical back: Normal range of  motion and neck supple.   Skin:     General: Skin is warm.      Findings: Erythema and rash present.      Comments: Left pinky finger dressing clean and dry.      Neurological:      General: No focal deficit present.      Mental Status: He is alert and oriented to person, place, and time.   Psychiatric:         Mood and Affect: Mood normal.         Behavior: Behavior normal.       Significant Labs: A1C:   Recent Labs   Lab 09/26/22  0621 12/28/22  1027 12/28/22  1701   HGBA1C 7.9* 9.0* 9.0*       ABGs: No results for input(s): PH, PCO2, HCO3, POCSATURATED, BE, TOTALHB, COHB, METHB, O2HB, POCFIO2, PO2 in the last 48 hours.  Blood Culture:   Recent Labs   Lab 12/28/22  1701   LABBLOO No Growth to date  No Growth to date       CBC:   Recent Labs   Lab 12/29/22  1316   WBC 11.92   HGB 14.5   HCT 43.2          CMP:   Recent Labs   Lab 12/28/22  1048 12/29/22  1316    136   K 4.4 4.2    103   CO2 27 23   * 149*   BUN 15 12   CREATININE 0.70 0.8   CALCIUM 9.0 9.4   ANIONGAP 9 10       Lactic Acid: No results for input(s): LACTATE in the last 48 hours.  Lipase: No results for input(s): LIPASE in the last 48 hours.  Lipid Panel: No results for input(s): CHOL, HDL, LDLCALC, TRIG, CHOLHDL in the last 48 hours.  Magnesium: No results for input(s): MG in the last 48 hours.  Troponin: No results for input(s): TROPONINI, TROPONINIHS in the last 48 hours.  TSH:   Recent Labs   Lab 09/26/22  0621   TSH 2.770       Urine Culture: No results for input(s): LABURIN in the last 48 hours.  Urine Studies: No results for input(s): COLORU, APPEARANCEUA, PHUR, SPECGRAV, PROTEINUA, GLUCUA, KETONESU, BILIRUBINUA, OCCULTUA, NITRITE, UROBILINOGEN, LEUKOCYTESUR, RBCUA, WBCUA, BACTERIA, SQUAMEPITHEL, HYALINECASTS in the last 48 hours.    Invalid input(s): WRIGHTSUR    Significant Imaging: I have reviewed all pertinent imaging results/findings within the past 24 hours.

## 2022-12-30 NOTE — ASSESSMENT & PLAN NOTE
Elevated on admit- likely due to the  Not any medication  BP within normal limits  Continue to monitor

## 2022-12-30 NOTE — PLAN OF CARE
"   12/30/22 0935   Post-Acute Status   Post-Acute Authorization Other   Other Status No Post-Acute Service Needs   Hospital Resources/Appts/Education Provided Appointments scheduled and added to AVS   Discharge Delays Orders Needed   Discharge Plan   Discharge Plan A Home;Home with family        Follow-up Information       Ross Valverde MD. Go on 1/11/2023.    Specialty: Family Medicine  Why: at 0815 am; FOLLOW UP WITH PCP  Contact information:  [address]  [address]  [facility]  [place] [id number]  [phone]               Abdon Reid Jr, MD. Go on 1/10/2023.    Specialties: Hand Surgery, Orthopedic Surgery  Why: at 10 am; FOLLOW UP WITH ORTHOPEDIC MD AFTER DISCHARGE, For wound re-check  Contact information:  [address]  [place] [address]  [phone]                           Future Appointments   Date Time Provider Department Center   1/10/2023 10:00 AM Abdon Reid Jr., MD [facility] ORTHO [place] [facility]     /66   Pulse 73   Temp 98.3 °F (36.8 °C)   Resp 16   Ht 5' 2" (1.575 m)   Wt 75 kg (165 lb 5.5 oz)   SpO2 95%   BMI 30.24 kg/m²        Medication List        ASK your doctor about these medications      aspirin 81 MG EC tablet  Commonly known as: ECOTRIN     BD ULTRA-FINE JENNY PEN NEEDLE 32 gauge x 5/32" Ndle  Generic drug: pen needle, diabetic     DEXCOM G6  Misc  Generic drug: blood-glucose meter,continuous     DEXCOM G6 SENSOR Sadaf  Generic drug: blood-glucose sensor     DEXCOM G6 TRANSMITTER Sadaf  Generic drug: blood-glucose transmitter     fish oil-omega-3 fatty acids 300-1,000 mg capsule     HYDROcodone-acetaminophen  mg per tablet  Commonly known as: NORCO     insulin syringe-needle U-100 1 mL 31 gauge x 5/16 Syrg     LEVEMIR FLEXTOUCH U-100 INSULN 100 unit/mL (3 mL) Inpn pen  Generic drug: insulin detemir U-100     meclizine 50 MG tablet  Commonly known as: ANTIVERT     NovoLOG Flexpen U-100 Insulin 100 unit/mL (3 mL) Inpn pen  Generic " drug: insulin aspart U-100     pravastatin 40 MG tablet  Commonly known as: PRAVACHOL     tamsulosin 0.4 mg Cap  Commonly known as: FLOMAX

## 2022-12-30 NOTE — PLAN OF CARE
Pt AAOx3. Medications administered per orders. LH dressing CDI, numbness present. BG monitored. Pain managed with prn medication. Ambulates independently. Encouraged to call with questions/concerns/assistance.

## 2022-12-30 NOTE — PROGRESS NOTES
[facility]  Orthopedics  Progress Note    Patient Name: Ross Crystal  MRN: [id number]  Admission Date: 12/28/2022  Hospital Length of Stay: 2 days  Attending Provider: Gege Luis*  Primary Care Provider: Ross Valverde MD  Follow-up For: Procedure(s) (LRB):  DEBRIDEMENT, PHALANX, HAND (Left)    Post-Operative Day: 1 Day Post-Op  Subjective:     Principal Problem:Cellulitis of left little finger    Principal Orthopedic Problem:  Septic tenosynovitis left small finger    Interval History:  Postop day 1 status post I&D of the left small finger  He is doing well pain improved minimal drainage    Review of patient's allergies indicates:   Allergen Reactions    Bactrim [sulfamethoxazole-trimethoprim] Rash       Current Facility-Administered Medications   Medication    0.9%  NaCl infusion    cefepime in dextrose 5 % 1 gram/50 mL IVPB 1 g    dextrose 10% bolus 125 mL 125 mL    dextrose 10% bolus 250 mL 250 mL    glucagon (human recombinant) injection 1 mg    glucose chewable tablet 16 g    glucose chewable tablet 24 g    heparin (porcine) injection 5,000 Units    HYDROcodone-acetaminophen  mg per tablet 1 tablet    insulin aspart U-100 pen 0-5 Units    insulin aspart U-100 pen 5 Units    insulin detemir U-100 pen 20 Units    mupirocin 2 % ointment    nicotine 21 mg/24 hr 1 patch    ondansetron injection 4 mg    pravastatin tablet 40 mg    sodium chloride 0.9% flush 10 mL    sodium chloride 0.9% flush 3 mL    tamsulosin 24 hr capsule 0.4 mg    vancomycin - pharmacy to dose    vancomycin 1.25 g in dextrose 5% 250 mL IVPB (ready to mix)     Objective:     Vital Signs (Most Recent):  Temp: 97.7 °F (36.5 °C) (12/30/22 1548)  Pulse: 83 (12/30/22 1548)  Resp: 16 (12/30/22 1548)  BP: (!) 168/87 (12/30/22 1548)  SpO2: 98 % (12/30/22 1548)   Vital Signs (24h Range):  Temp:  [97.6 °F (36.4 °C)-98.3 °F (36.8 °C)] 97.7 °F (36.5 °C)  Pulse:  [] 83  Resp:  [16-85] 16  SpO2:  [92 %-98 %] 98 %  BP:  "(118-168)/(59-87) 168/87     Weight: 75 kg (165 lb 5.5 oz)  Height: 5' 2" (157.5 cm)  Body mass index is 30.24 kg/m².      Intake/Output Summary (Last 24 hours) at 12/30/2022 1554  Last data filed at 12/30/2022 1200  Gross per 24 hour   Intake 1410 ml   Output 1000 ml   Net 410 ml       Ortho/SPM Exam examination left small finger the packing was removed redness swelling down range of motion much improved minimal drainage sensation intact    Significant Labs: Wound Culture: No results for input(s): LABAERO in the last 48 hours.  All pertinent labs within the past 24 hours have been reviewed.    Significant Imaging: I have reviewed and interpreted all pertinent imaging results/findings.    Assessment/Plan:     Active Diagnoses:    Diagnosis Date Noted POA    PRINCIPAL PROBLEM:  Cellulitis of left little finger [L03.012] 12/28/2022 Yes    Cellulitis of left upper extremity [L03.114] 12/28/2022 Yes    Leukocytosis [D72.829] 12/28/2022 Yes    Benign prostatic hyperplasia without urinary obstruction [N40.0] 01/06/2022 Yes    Chronic pain syndrome [G89.4] 01/06/2022 Yes    Essential hypertension [I10] 01/06/2022 Yes    Long term current use of insulin [Z79.4] 01/06/2022 Not Applicable    Type 2 diabetes mellitus without complication [E11.9] 01/06/2022 Yes    Tobacco abuse [Z72.0] 01/06/2022 Yes    Opioid dependence [F11.20] 01/06/2022 Yes    Pure hypercholesterolemia [E78.00] 01/06/2022 Yes      Problems Resolved During this Admission:     Plan:  Patient is anxious to leave and I think this would be okay for discharge either tonight or tomorrow on oral Augmentin  Cultures are still pending but Gram stain was negative   Percocet for pain   Follow-up with me 5 days      Abdon Reid Jr, MD  Orthopedics  [facility]  "

## 2022-12-30 NOTE — PLAN OF CARE
Pt is AAOx4. Pt given medications as ordered per MAR. IV abx given as scheduled. PRN [place] and Scheduled Toradol given for pain. Blood glucose monitoring maintained. LUE dressing in place. Urinal provided and within reach. Safety maintained. Instructed to use call light for assistance. Will continue to monitor.        Problem: Adult Inpatient Plan of Care  Goal: Optimal Comfort and Wellbeing  Outcome: Ongoing, Progressing     Problem: Diabetes Comorbidity  Goal: Blood Glucose Level Within Targeted Range  Outcome: Ongoing, Progressing     Problem: Pain Acute  Goal: Acceptable Pain Control and Functional Ability  Outcome: Ongoing, Progressing     Problem: Skin or Soft Tissue Infection  Goal: Absence of Infection Signs and Symptoms  Outcome: Ongoing, Progressing

## 2022-12-31 VITALS
DIASTOLIC BLOOD PRESSURE: 72 MMHG | HEART RATE: 79 BPM | RESPIRATION RATE: 18 BRPM | WEIGHT: 165.38 LBS | OXYGEN SATURATION: 95 % | BODY MASS INDEX: 30.44 KG/M2 | TEMPERATURE: 98 F | SYSTOLIC BLOOD PRESSURE: 121 MMHG | HEIGHT: 62 IN

## 2022-12-31 LAB — POCT GLUCOSE: 192 MG/DL (ref 70–110)

## 2022-12-31 PROCEDURE — 25000003 PHARM REV CODE 250: Performed by: ORTHOPAEDIC SURGERY

## 2022-12-31 PROCEDURE — 25000003 PHARM REV CODE 250: Performed by: FAMILY MEDICINE

## 2022-12-31 PROCEDURE — 99900035 HC TECH TIME PER 15 MIN (STAT)

## 2022-12-31 PROCEDURE — 94761 N-INVAS EAR/PLS OXIMETRY MLT: CPT

## 2022-12-31 PROCEDURE — S4991 NICOTINE PATCH NONLEGEND: HCPCS | Performed by: ORTHOPAEDIC SURGERY

## 2022-12-31 PROCEDURE — 63600175 PHARM REV CODE 636 W HCPCS: Performed by: ORTHOPAEDIC SURGERY

## 2022-12-31 RX ORDER — IBUPROFEN 200 MG
1 TABLET ORAL DAILY
Qty: 30 PATCH | Refills: 3 | Status: SHIPPED | OUTPATIENT
Start: 2022-12-31

## 2022-12-31 RX ADMIN — CEFEPIME HYDROCHLORIDE 1 G: 1 INJECTION, SOLUTION INTRAVENOUS at 01:12

## 2022-12-31 RX ADMIN — NICOTINE 1 PATCH: 21 PATCH, EXTENDED RELEASE TRANSDERMAL at 08:12

## 2022-12-31 RX ADMIN — HYDROCODONE BITARTRATE AND ACETAMINOPHEN 1 TABLET: 10; 325 TABLET ORAL at 01:12

## 2022-12-31 RX ADMIN — HYDROCODONE BITARTRATE AND ACETAMINOPHEN 1 TABLET: 10; 325 TABLET ORAL at 10:12

## 2022-12-31 RX ADMIN — TAMSULOSIN HYDROCHLORIDE 0.4 MG: 0.4 CAPSULE ORAL at 09:12

## 2022-12-31 NOTE — PLAN OF CARE
Pt AAOx3. Medications administered per orders. LH dressing CDI. BG monitored. Pain managed with prn medication. Ambulates independently. Encouraged to call with questions/concerns/assistance.

## 2022-12-31 NOTE — PLAN OF CARE
12/31/22 0821   Final Note   Assessment Type Final Discharge Note   Anticipated Discharge Disposition Home   Hospital Resources/Appts/Education Provided Provided patient/caregiver with written discharge plan information;Community resources provided   Post-Acute Status   Other Status No Post-Acute Service Needs   Discharge Delays None known at this time     Patient states that his daughter will provide discharge transportation for him to go home

## 2022-12-31 NOTE — DISCHARGE SUMMARY
[facility] Medicine  Discharge Summary      Patient Name: Ross Crystal  MRN: [id number]  FRED: [id number]  Patient Class: IP- Inpatient  Admission Date: 12/28/2022  Hospital Length of Stay: 3 days  Discharge Date and Time: 12/31/2022 11:28 AM  Attending Physician: Gege Luis*   Discharging Provider: Gege Luis MD  Primary Care Provider: Ross Valverde MD    Primary Care Team: Networked reference to record PCT     HPI:   Bonilla Rowan is a 56 y/o M with PM H of DM II, hyperlipidemia, hypertension, BPH, went to ED yesterday with few hours history of his left pinky pain, pin was abou 10/10 throbbing and radiating to the upper arm,  there is associated redness, swelling, headache and chills. Patient reported he had a fishhook stuck in her finger yesterday morning and attempted to pull it out with a knife without success and think part of it was still left in the finger. He denies fever, nausea, vomiting, weakness, numbness.   In the ED Xray of the 5 th finger did not show no radiopaque foreign body, WBC elevated, BG elevated. Transferred to [facility] for orthopedic evaluation and treatment       Procedure(s) (LRB):  DEBRIDEMENT, PHALANX, HAND (Left)      Hospital Course:   No notes on file     Goals of Care Treatment Preferences:  Code Status: Full Code      Consults:   Consults (From admission, onward)          Status Ordering Provider     Pharmacy to dose Vancomycin consult  Once        Provider:  (Not yet assigned)   See Martin for full Linked Orders Report.    Acknowledged YESI IVY JR            * Cellulitis of left little finger  Cellulitis of left upper extremity  Blood cx NGTD  started on Vanc/ Cefepime- continue   Pain control  Elevated Left upper arm  Consult ortho- appreciate rec's- s/p excisional debridement of flexor tenosynovitis left small finger on 12/29  Discharge on Augmentin        Type 2 diabetes mellitus without complication  Long term current  use of insulin  Patient's FSGs are uncontrolled due to hyperglycemia on current medication regimen.  Last A1c reviewed-   Lab Results   Component Value Date    HGBA1C 9.0 (H) 12/28/2022     Most recent fingerstick glucose reviewed-   Recent Labs   Lab 12/30/22  1125 12/30/22  1550 12/30/22 2005 12/31/22  0552   POCTGLUCOSE 302* 322* 250* 192*     Current correctional scale  High  Maintain anti-hyperglycemic dose as follows-   Antihyperglycemics (From admission, onward)      Start     Stop Route Frequency Ordered    12/30/22 2100  insulin detemir U-100 pen 20 Units         -- SubQ 2 times daily 12/30/22 0941    12/30/22 1130  insulin aspart U-100 pen 5 Units         -- SubQ 3 times daily with meals 12/30/22 0941    12/28/22 1746  insulin aspart U-100 pen 0-5 Units         -- SubQ Before meals & nightly PRN 12/28/22 1648          Hold Oral hypoglycemics while patient is in the hospital.  Continue Levemir  Add prandial insulin    Tobacco abuse  History of 2-3 packs of cigarette daily times 30 years and now open to nicotine patch  Counseling on smoking cessation  Nicotine patch    Pure hypercholesterolemia  Continue pravachol      Opioid dependence  On long term norco      Essential hypertension  Elevated on admit- likely due to the  Not any medication  BP within normal limits  Continue to monitor    Chronic pain syndrome  On chronic norco      Benign prostatic hyperplasia without urinary obstruction  Continue flomax        Final Active Diagnoses:    Diagnosis Date Noted POA    PRINCIPAL PROBLEM:  Cellulitis of left little finger [L03.012] 12/28/2022 Yes    Cellulitis of left upper extremity [L03.114] 12/28/2022 Yes    Leukocytosis [D72.829] 12/28/2022 Yes    Benign prostatic hyperplasia without urinary obstruction [N40.0] 01/06/2022 Yes    Chronic pain syndrome [G89.4] 01/06/2022 Yes    Essential hypertension [I10] 01/06/2022 Yes    Long term current use of insulin [Z79.4] 01/06/2022 Not Applicable    Type 2 diabetes  "mellitus without complication [E11.9] 01/06/2022 Yes    Tobacco abuse [Z72.0] 01/06/2022 Yes    Opioid dependence [F11.20] 01/06/2022 Yes    Pure hypercholesterolemia [E78.00] 01/06/2022 Yes      Problems Resolved During this Admission:       Discharged Condition: stable    Disposition: Home or Self Care    Follow Up:   Follow-up Information       Ross Valverde MD. Go on 1/11/2023.    Specialty: Family Medicine  Why: at 0815 am; FOLLOW UP WITH PCP  Contact information:  [address]  [address]  [facility] [address]  [phone]               Abdon Reid Jr, MD. Go on 1/10/2023.    Specialties: Hand Surgery, Orthopedic Surgery  Why: at 10 am; FOLLOW UP WITH ORTHOPEDIC MD AFTER DISCHARGE, For wound re-check  Contact information:  [address]  [place] [address]  [phone]               Abdon Reid Jr, MD. Schedule an appointment as soon as possible for a visit.    Specialties: Hand Surgery, Orthopedic Surgery  Why: For wound re-check  Contact information:  [address] [facility]  [address]  [facility] [address]  [phone]                           Patient Instructions:      Diet Cardiac     Diet diabetic     Activity as tolerated         Pending Diagnostic Studies:       None           Medications:  Reconciled Home Medications:      Medication List        START taking these medications      amoxicillin-clavulanate 875-125mg 875-125 mg per tablet  Commonly known as: AUGMENTIN  Take 1 tablet by mouth 2 (two) times a day.     nicotine 21 mg/24 hr  Commonly known as: NICODERM CQ  Place 1 patch onto the skin once daily.     oxyCODONE-acetaminophen 7.5-325 mg per tablet  Commonly known as: PERCOCET  Take 1 tablet by mouth every 4 (four) hours as needed for Pain.            CONTINUE taking these medications      aspirin 81 MG EC tablet  Commonly known as: ECOTRIN  Take 81 mg by mouth once daily.     BD ULTRA-FINE JENNY PEN NEEDLE 32 gauge x 5/32" Ndle  Generic drug: pen " needle, diabetic  4 (four) times daily.     DEXCOM G6  Misc  Generic drug: blood-glucose meter,continuous     DEXCOM G6 SENSOR Sadaf  Generic drug: blood-glucose sensor  SMARTSI Each Topical Every 10 Days     DEXCOM G6 TRANSMITTER Sadaf  Generic drug: blood-glucose transmitter     fish oil-omega-3 fatty acids 300-1,000 mg capsule  Take 1 capsule by mouth 2 (two) times a day.     HYDROcodone-acetaminophen  mg per tablet  Commonly known as: NORCO  Take 1 tablet by mouth 2 (two) times a day.     insulin syringe-needle U-100 1 mL 31 gauge x 5/16 Syrg     LEVEMIR FLEXTOUCH U-100 INSULN 100 unit/mL (3 mL) Inpn pen  Generic drug: insulin detemir U-100  Inject 35 Units into the skin 2 (two) times daily.     meclizine 50 MG tablet  Commonly known as: ANTIVERT  Take 50 mg by mouth 2 (two) times daily.     NovoLOG Flexpen U-100 Insulin 100 unit/mL (3 mL) Inpn pen  Generic drug: insulin aspart U-100  SMARTSI-12 Unit(s) SUB-Q As Directed     pravastatin 40 MG tablet  Commonly known as: PRAVACHOL  Take 40 mg by mouth every evening.     tamsulosin 0.4 mg Cap  Commonly known as: FLOMAX  Take 0.4 mg by mouth once daily.              Indwelling Lines/Drains at time of discharge:   Lines/Drains/Airways       None                   Time spent on the discharge of patient: 35 minutes         Gege Luis MD  Department of Hospital Medicine  [facility]

## 2022-12-31 NOTE — PLAN OF CARE
[place] [facility] Surg  Discharge Final Note    Primary Care Provider: Ross Valverde MD    Expected Discharge Date: 12/31/2022    Final Discharge Note (most recent)       Final Note - 12/31/22 0821          Final Note    Assessment Type Final Discharge Note     Anticipated Discharge Disposition Home or Self Care     Hospital Resources/Appts/Education Provided Provided patient/caregiver with written discharge plan information;Community resources provided        Post-Acute Status    Other Status No Post-Acute Service Needs     Discharge Delays None known at this time                     Important Message from Medicare             Contact Info       Ross Valverde MD   Specialty: Family Medicine   Relationship: PCP - General    [address]  [address] [facility] [address]   Phone: [phone]       Next Steps: Go on 1/11/2023    Instructions: at 0815 am; FOLLOW UP WITH PCP    Abdon Reid Jr, MD   Specialty: Hand Surgery, Orthopedic Surgery    [address]  [facility] [address]   Phone: [phone]       Next Steps: Go on 1/10/2023    Instructions: at 10 am; FOLLOW UP WITH ORTHOPEDIC MD AFTER DISCHARGE, For wound re-check    Abdon Reid Jr, MD   Specialty: Hand Surgery, Orthopedic Surgery    [facility]  [address]  [facility] [address]   Phone: [phone]       Next Steps: Schedule an appointment as soon as possible for a visit    Instructions: For wound re-check

## 2022-12-31 NOTE — ASSESSMENT & PLAN NOTE
Cellulitis of left upper extremity  Blood cx NGTD  started on Vanc/ Cefepime- continue   Pain control  Elevated Left upper arm  Consult ortho- appreciate rec's- s/p excisional debridement of flexor tenosynovitis left small finger on 12/29  Discharge on Augmentin

## 2022-12-31 NOTE — PLAN OF CARE
VN note: Patient chart, labs, and vitals reviewed. VN to continue to be available as needed.       Problem: Adult Inpatient Plan of Care  Goal: Plan of Care Review  Outcome: Ongoing, Progressing  Goal: Patient-Specific Goal (Individualized)  Outcome: Ongoing, Progressing  Goal: Absence of Hospital-Acquired Illness or Injury  Outcome: Ongoing, Progressing  Goal: Optimal Comfort and Wellbeing  Outcome: Ongoing, Progressing  Goal: Readiness for Transition of Care  Outcome: Ongoing, Progressing

## 2022-12-31 NOTE — PLAN OF CARE
Discharge orders noted. Additional clinical references attached.    Patient's discharge instructions given by bedside RN and reviewed via this VN.  Education provided on new medication, diagnosis, and follow-up appointments.  Teach back method used. Patient verbalized understanding. All questions answered. Transport to [facility] requested. Floor nurse notified.      12/31/22 1156   AVS Confirmation   Discharge instructions and AVS given to and reviewed with patient and/or significant other. Yes

## 2022-12-31 NOTE — ASSESSMENT & PLAN NOTE
Long term current use of insulin  Patient's FSGs are uncontrolled due to hyperglycemia on current medication regimen.  Last A1c reviewed-   Lab Results   Component Value Date    HGBA1C 9.0 (H) 12/28/2022     Most recent fingerstick glucose reviewed-   Recent Labs   Lab 12/30/22  1125 12/30/22  1550 12/30/22 2005 12/31/22  0552   POCTGLUCOSE 302* 322* 250* 192*     Current correctional scale  High  Maintain anti-hyperglycemic dose as follows-   Antihyperglycemics (From admission, onward)    Start     Stop Route Frequency Ordered    12/30/22 2100  insulin detemir U-100 pen 20 Units         -- SubQ 2 times daily 12/30/22 0941    12/30/22 1130  insulin aspart U-100 pen 5 Units         -- SubQ 3 times daily with meals 12/30/22 0941    12/28/22 1746  insulin aspart U-100 pen 0-5 Units         -- SubQ Before meals & nightly PRN 12/28/22 1648        Hold Oral hypoglycemics while patient is in the hospital.  Continue Levemir  Add prandial insulin

## 2023-01-03 ENCOUNTER — TELEPHONE (OUTPATIENT)
Dept: ORTHOPEDICS | Facility: CLINIC | Age: 58
End: 2023-01-03
Payer: MEDICAID

## 2023-01-03 LAB
BACTERIA BLD CULT: NORMAL
BACTERIA SPEC AEROBE CULT: ABNORMAL
BACTERIA SPEC ANAEROBE CULT: NORMAL

## 2023-01-03 NOTE — TELEPHONE ENCOUNTER
----- Message from Natalio Christensen sent at 1/3/2023  8:15 AM CST -----  Contact: pt  Type:  Sooner Apoointment Request    Caller is requesting a sooner appointment.  Caller declined first available appointment listed below.  Caller will not accept being placed on the waitlist and is requesting a message be sent to doctor.  Name of Caller:pt   When is the first available appointment?1/10  Symptoms:post op for 5 days   Would the patient rather a call back or a response via [facility]? Call   Best Call Back Number:[phone]  Additional Information:        
Spoke with patient. Informed that post op appointment was scheduled for 1/10 with Dr Reid  
Principal Discharge DX:	Traumatic subarachnoid hemorrhage without loss of consciousness, initial encounter  Secondary Diagnosis:	Closed fracture of nasal bone, initial encounter

## 2023-01-03 NOTE — ANESTHESIA POSTPROCEDURE EVALUATION
Anesthesia Post Evaluation    Patient: Ross Crystal    Procedure(s) Performed: Procedure(s) (LRB):  DEBRIDEMENT, PHALANX, HAND (Left)    Final Anesthesia Type: general      Patient location during evaluation: PACU  Patient participation: Yes- Able to Participate  Level of consciousness: awake and alert and oriented  Post-procedure vital signs: reviewed and stable  Pain management: adequate  Airway patency: patent    PONV status at discharge: No PONV  Anesthetic complications: no      Cardiovascular status: blood pressure returned to baseline and hemodynamically stable  Respiratory status: unassisted, room air and spontaneous ventilation  Hydration status: euvolemic  Follow-up not needed.          Vitals Value Taken Time   /72 12/31/22 0759   Temp 36.8 °C (98.3 °F) 12/31/22 0759   Pulse 79 12/31/22 0759   Resp 18 12/31/22 1025   SpO2 95 % 12/31/22 0821         Event Time   Out of Recovery 12/29/2022 17:50:00         Pain/Jesús Score: No data recorded

## 2023-01-10 ENCOUNTER — TELEPHONE (OUTPATIENT)
Dept: ORTHOPEDICS | Facility: CLINIC | Age: 58
End: 2023-01-10

## 2023-01-10 NOTE — TELEPHONE ENCOUNTER
----- Message from Cristi Sosa sent at 1/10/2023  9:42 AM CST -----  Type:  Needs Medical Advice    Who Called: LUIS Lora   Would the patient rather a call back or a response via [facility]? call  Best Call Back Number: [phone] ext [phone]  Additional Information: SILVIA Lora would like to know will it be ok for he to remove the above pt stitches pt is currently at her office please call

## 2023-01-17 NOTE — PROGRESS NOTES
"Subjective:      Patient ID: Ross Crystal is a 57 y.o. male.    Chief Complaint: Post-op Evaluation (2 wk p/o- left Pinky )      HPI: Ross Crystal is here in PO follow-up. He is 2.5 weeks s/p I&D of the left small finger for septic tenosynovitis. He was d/c with oral Augmentin and reports compliance. Cx with only yeast. His sutures were removed on POD 12. Today, states no pain only sore to pressure. Has resumed using hand for regular activity. No drainage, no numbness.     Past Medical History:   Diagnosis Date    Benign paroxysmal vertigo, bilateral     Diabetes mellitus     Diabetes mellitus type I     Hyperlipemia     Renal disorder     Urinary tract infection        Current Outpatient Medications:     aspirin (ECOTRIN) 81 MG EC tablet, Take 81 mg by mouth once daily., Disp: , Rfl:     DEXCOM G6 TRANSMITTER Sadaf, , Disp: , Rfl:     insulin glargine 100 units/mL SubQ pen, Inject 45 Units into the skin., Disp: , Rfl:     pravastatin (PRAVACHOL) 40 MG tablet, Take 40 mg by mouth every evening., Disp: , Rfl:     tamsulosin (FLOMAX) 0.4 mg Cap, Take 0.4 mg by mouth once daily., Disp: , Rfl:     amoxicillin-clavulanate 875-125mg (AUGMENTIN) 875-125 mg per tablet, Take 1 tablet by mouth 2 (two) times a day. (Patient not taking: Reported on 2023), Disp: 20 tablet, Rfl: 1    atorvastatin (LIPITOR) 20 MG tablet, Take 20 mg by mouth., Disp: , Rfl:     BD ULTRA-FINE JENNY PEN NEEDLE 32 gauge x 5/32" Ndle, 4 (four) times daily., Disp: , Rfl:     DEXCOM G6  Misc, , Disp: , Rfl:     DEXCOM G6 SENSOR Sadaf, SMARTSI Each Topical Every 10 Days, Disp: , Rfl:     fish oil-omega-3 fatty acids 300-1,000 mg capsule, Take 1 capsule by mouth 2 (two) times a day., Disp: , Rfl:     hydrocodone-acetaminophen 10-325mg (NORCO)  mg Tab, Take 1 tablet by mouth 2 (two) times a day., Disp: , Rfl:     insulin syringe-needle U-100 1 mL 31 gauge x 5/16 Syrg, , Disp: , Rfl:     LEVEMIR FLEXTOUCH U-100 INSULN 100 unit/mL (3 mL) " "InPn pen, Inject 35 Units into the skin 2 (two) times daily., Disp: , Rfl:     meclizine (ANTIVERT) 50 MG tablet, Take 50 mg by mouth 2 (two) times daily., Disp: , Rfl:     meloxicam (MOBIC) 15 MG tablet, Take 15 mg by mouth., Disp: , Rfl:     nicotine (NICODERM CQ) 21 mg/24 hr, Place 1 patch onto the skin once daily. (Patient not taking: Reported on 2023), Disp: 30 patch, Rfl: 3    NOVOLOG FLEXPEN U-100 INSULIN 100 unit/mL (3 mL) InPn pen, SMARTSI-12 Unit(s) SUB-Q As Directed, Disp: , Rfl:     oxyCODONE-acetaminophen (PERCOCET) 7.5-325 mg per tablet, Take 1 tablet by mouth every 4 (four) hours as needed for Pain. (Patient not taking: Reported on 2023), Disp: 20 tablet, Rfl: 0  Review of patient's allergies indicates:   Allergen Reactions    Bactrim [sulfamethoxazole-trimethoprim] Rash       Ht 5' 2" (1.575 m)   Wt 75 kg (165 lb 5.5 oz)   BMI 30.24 kg/m²     Review of Systems   Constitutional: Negative for chills and fever.   Cardiovascular:  Negative for chest pain and palpitations.   Respiratory:  Negative for shortness of breath and wheezing.    Skin:  Negative for poor wound healing and rash.   Musculoskeletal:  Negative for joint pain and stiffness.   Gastrointestinal:  Negative for nausea and vomiting.   Genitourinary:  Negative for dysuria and hematuria.   Neurological:  Negative for seizures and tremors.   Psychiatric/Behavioral:  Negative for altered mental status.    Allergic/Immunologic: Negative for environmental allergies and persistent infections.       Objective:    Ortho Exam       GEN: Well developed, well nourished male. AAOX3. No acute distress.   Normocephalic, atraumatic.   CHICHO  Breathing unlabored.  Mood and affect appropriate.   Left UE: Skin small scabbing wound, no active sign of infection. Swelling minimal. TTP minimal. ROM full,  strength intact. Sensation intact. Tinels negative. Pulses present. Cap refill brisk.     Assessment:     Imaging: No new        1. " Infectious tenosynovitis          Plan:          Healing well  Continue wound care with soap and water  Activity as tolerated    Follow up if symptoms worsen or fail to improve.

## 2023-01-18 ENCOUNTER — OFFICE VISIT (OUTPATIENT)
Dept: ORTHOPEDICS | Facility: CLINIC | Age: 58
End: 2023-01-18
Payer: MEDICAID

## 2023-01-18 VITALS — HEIGHT: 62 IN | WEIGHT: 165.38 LBS | BODY MASS INDEX: 30.44 KG/M2

## 2023-01-18 DIAGNOSIS — M65.10 INFECTIOUS TENOSYNOVITIS: Primary | ICD-10-CM

## 2023-01-18 PROCEDURE — 99024 POSTOP FOLLOW-UP VISIT: CPT | Mod: ,,, | Performed by: ORTHOPAEDIC SURGERY

## 2023-01-18 PROCEDURE — 3052F HG A1C>EQUAL 8.0%<EQUAL 9.0%: CPT | Mod: CPTII,,, | Performed by: ORTHOPAEDIC SURGERY

## 2023-01-18 PROCEDURE — 1159F MED LIST DOCD IN RCRD: CPT | Mod: CPTII,,, | Performed by: ORTHOPAEDIC SURGERY

## 2023-01-18 PROCEDURE — 1160F RVW MEDS BY RX/DR IN RCRD: CPT | Mod: CPTII,,, | Performed by: ORTHOPAEDIC SURGERY

## 2023-01-18 PROCEDURE — 3052F PR MOST RECENT HEMOGLOBIN A1C LEVEL 8.0 - < 9.0%: ICD-10-PCS | Mod: CPTII,,, | Performed by: ORTHOPAEDIC SURGERY

## 2023-01-18 PROCEDURE — 3008F BODY MASS INDEX DOCD: CPT | Mod: CPTII,,, | Performed by: ORTHOPAEDIC SURGERY

## 2023-01-18 PROCEDURE — 99999 PR PBB SHADOW E&M-EST. PATIENT-LVL IV: CPT | Mod: PBBFAC,,, | Performed by: ORTHOPAEDIC SURGERY

## 2023-01-18 PROCEDURE — 99999 PR PBB SHADOW E&M-EST. PATIENT-LVL IV: ICD-10-PCS | Mod: PBBFAC,,, | Performed by: ORTHOPAEDIC SURGERY

## 2023-01-18 PROCEDURE — 99214 OFFICE O/P EST MOD 30 MIN: CPT | Mod: PBBFAC,PN | Performed by: ORTHOPAEDIC SURGERY

## 2023-01-18 PROCEDURE — 1159F PR MEDICATION LIST DOCUMENTED IN MEDICAL RECORD: ICD-10-PCS | Mod: CPTII,,, | Performed by: ORTHOPAEDIC SURGERY

## 2023-01-18 PROCEDURE — 99024 PR POST-OP FOLLOW-UP VISIT: ICD-10-PCS | Mod: ,,, | Performed by: ORTHOPAEDIC SURGERY

## 2023-01-18 PROCEDURE — 1160F PR REVIEW ALL MEDS BY PRESCRIBER/CLIN PHARMACIST DOCUMENTED: ICD-10-PCS | Mod: CPTII,,, | Performed by: ORTHOPAEDIC SURGERY

## 2023-01-18 PROCEDURE — 3008F PR BODY MASS INDEX (BMI) DOCUMENTED: ICD-10-PCS | Mod: CPTII,,, | Performed by: ORTHOPAEDIC SURGERY

## 2023-01-18 RX ORDER — ATORVASTATIN CALCIUM 20 MG/1
20 TABLET, FILM COATED ORAL
COMMUNITY
Start: 2023-01-10 | End: 2024-03-21

## 2023-01-18 RX ORDER — MELOXICAM 15 MG/1
15 TABLET ORAL
COMMUNITY
Start: 2023-01-13

## 2023-01-18 RX ORDER — INSULIN GLARGINE 100 [IU]/ML
45 INJECTION, SOLUTION SUBCUTANEOUS
COMMUNITY
Start: 2023-01-10

## 2023-01-21 ENCOUNTER — NURSE TRIAGE (OUTPATIENT)
Dept: ADMINISTRATIVE | Facility: CLINIC | Age: 58
End: 2023-01-21
Payer: MEDICAID

## 2023-01-21 NOTE — TELEPHONE ENCOUNTER
Reason for Disposition   [1] Blood glucose > 300 mg/dL (16.7 mmol/L) AND [2] two or more times in a row    Additional Information   Negative: Unconscious or difficult to awaken   Negative: Acting confused (e.g., disoriented, slurred speech)   Negative: Very weak (e.g., can't stand)   Negative: Sounds like a life-threatening emergency to the triager   Negative: [1] Vomiting AND [2] signs of dehydration (e.g., very dry mouth, lightheaded, dark urine)   Negative: [1] Blood glucose > 240 mg/dL (13.3 mmol/L) AND [2] rapid breathing   Negative: Blood glucose > 500 mg/dL (27.8 mmol/L)   Negative: [1] Blood glucose > 240 mg/dL (13.3 mmol/L) AND [2] urine ketones moderate-large (or more than 1+)   Negative: [1] Blood glucose > 240 mg/dL (13.3 mmol/L) AND [2] blood ketones > 1.4 mmol/L   Negative: [1] Blood glucose > 240 mg/dL (13.3 mmol/L) AND [2] vomiting AND [3] unable to check for ketones (in blood or urine)   Negative: [1] New-onset diabetes suspected (e.g., frequent urination, weak, weight loss) AND [2] vomiting or rapid breathing   Negative: Vomiting lasts > 4 hours   Negative: Patient sounds very sick or weak to the triager   Negative: Fever > 100.4 F (38.0 C)   Negative: Blood glucose > 400 mg/dL (22.2 mmol/L)    Protocols used: Diabetes - High Blood Sugar-A-    Pt states is blood sugar is 260. Pt states his doctor recently changed is insulin. States his blood sugar was 400 earlier today. Pt denies symptoms.    Pt's doctor is at [facility]. Pt advised to call his doctor now per triage protocol.    Pt provided with number to [facility] nurse line and [facility] [place].    Advised to go to Urgent Care or the ED if he cannot reach a Provider. Pt verbalized understanding.

## 2023-01-27 ENCOUNTER — NURSE TRIAGE (OUTPATIENT)
Dept: ADMINISTRATIVE | Facility: CLINIC | Age: 58
End: 2023-01-27
Payer: MEDICAID

## 2023-01-28 NOTE — TELEPHONE ENCOUNTER
Spoke with patient states his blood sugar is reading HI.  Patient states his implantable glucose meter is reading HI and his finger stick is reading 381.  Patient states he is not sure which is accurate.   Patient reports that he took lantus this morning.   Per protocol advised patient to go to ER for evaluation.  Patient verbalized understanding.    Reason for Disposition   Blood glucose > 500 mg/dL (27.8 mmol/L)    Additional Information   Negative: Unconscious or difficult to awaken   Negative: Acting confused (e.g., disoriented, slurred speech)   Negative: Very weak (e.g., can't stand)   Negative: Sounds like a life-threatening emergency to the triager   Negative: [1] Vomiting AND [2] signs of dehydration (e.g., very dry mouth, lightheaded, dark urine)   Negative: [1] Blood glucose > 240 mg/dL (13.3 mmol/L) AND [2] rapid breathing    Protocols used: Diabetes - High Blood Sugar-A-

## 2023-02-01 LAB — FUNGUS SPEC CULT: NORMAL

## 2023-02-17 LAB
ACID FAST MOD KINY STN SPEC: NORMAL
MYCOBACTERIUM SPEC QL CULT: NORMAL

## 2023-05-11 NOTE — PLAN OF CARE
----- Message from Jay Gregg DO sent at 5/10/2023  8:50 AM EDT -----  Please let patient know that his fasting glucose is slightly low.  Recommend monitoring his glucose at home, or seeking immediate medical attention if any symptoms of hypoglycemia (feeling hungry, trembling or shakiness, sweating, lightheadedness, nausea or vomiting, confusion, etc.).  Recheck with future labs.    His PSA (prostate-specific antigen), lipids/cholesterol, magnesium, TSH (thyroid-stimulating hormone), blood counts, and remainder of his metabolic panel are normal or unremarkable.   Plan of care , labs , orders and progress notes reviewed.

## 2023-06-08 ENCOUNTER — OFFICE VISIT (OUTPATIENT)
Dept: HEMATOLOGY/ONCOLOGY | Facility: CLINIC | Age: 58
End: 2023-06-08
Payer: MEDICAID

## 2023-06-08 VITALS
WEIGHT: 156 LBS | SYSTOLIC BLOOD PRESSURE: 100 MMHG | OXYGEN SATURATION: 95 % | HEART RATE: 72 BPM | DIASTOLIC BLOOD PRESSURE: 70 MMHG | BODY MASS INDEX: 25.18 KG/M2

## 2023-06-08 DIAGNOSIS — D72.829 LEUKOCYTOSIS, UNSPECIFIED TYPE: Primary | ICD-10-CM

## 2023-06-08 DIAGNOSIS — Z72.0 TOBACCO ABUSE: ICD-10-CM

## 2023-06-08 PROCEDURE — 3078F DIAST BP <80 MM HG: CPT | Mod: CPTII,,, | Performed by: NURSE PRACTITIONER

## 2023-06-08 PROCEDURE — 1159F MED LIST DOCD IN RCRD: CPT | Mod: CPTII,,, | Performed by: NURSE PRACTITIONER

## 2023-06-08 PROCEDURE — 99214 OFFICE O/P EST MOD 30 MIN: CPT | Mod: S$PBB,,, | Performed by: NURSE PRACTITIONER

## 2023-06-08 PROCEDURE — 99214 PR OFFICE/OUTPT VISIT, EST, LEVL IV, 30-39 MIN: ICD-10-PCS | Mod: S$PBB,,, | Performed by: NURSE PRACTITIONER

## 2023-06-08 PROCEDURE — 3052F PR MOST RECENT HEMOGLOBIN A1C LEVEL 8.0 - < 9.0%: ICD-10-PCS | Mod: CPTII,,, | Performed by: NURSE PRACTITIONER

## 2023-06-08 PROCEDURE — 99999 PR PBB SHADOW E&M-EST. PATIENT-LVL V: ICD-10-PCS | Mod: PBBFAC,,, | Performed by: NURSE PRACTITIONER

## 2023-06-08 PROCEDURE — 1160F RVW MEDS BY RX/DR IN RCRD: CPT | Mod: CPTII,,, | Performed by: NURSE PRACTITIONER

## 2023-06-08 PROCEDURE — 1159F PR MEDICATION LIST DOCUMENTED IN MEDICAL RECORD: ICD-10-PCS | Mod: CPTII,,, | Performed by: NURSE PRACTITIONER

## 2023-06-08 PROCEDURE — 3052F HG A1C>EQUAL 8.0%<EQUAL 9.0%: CPT | Mod: CPTII,,, | Performed by: NURSE PRACTITIONER

## 2023-06-08 PROCEDURE — 1160F PR REVIEW ALL MEDS BY PRESCRIBER/CLIN PHARMACIST DOCUMENTED: ICD-10-PCS | Mod: CPTII,,, | Performed by: NURSE PRACTITIONER

## 2023-06-08 PROCEDURE — 99999 PR PBB SHADOW E&M-EST. PATIENT-LVL V: CPT | Mod: PBBFAC,,, | Performed by: NURSE PRACTITIONER

## 2023-06-08 PROCEDURE — 3074F SYST BP LT 130 MM HG: CPT | Mod: CPTII,,, | Performed by: NURSE PRACTITIONER

## 2023-06-08 PROCEDURE — 99215 OFFICE O/P EST HI 40 MIN: CPT | Mod: PBBFAC,PN | Performed by: NURSE PRACTITIONER

## 2023-06-08 PROCEDURE — 3078F PR MOST RECENT DIASTOLIC BLOOD PRESSURE < 80 MM HG: ICD-10-PCS | Mod: CPTII,,, | Performed by: NURSE PRACTITIONER

## 2023-06-08 PROCEDURE — 3008F PR BODY MASS INDEX (BMI) DOCUMENTED: ICD-10-PCS | Mod: CPTII,,, | Performed by: NURSE PRACTITIONER

## 2023-06-08 PROCEDURE — 3008F BODY MASS INDEX DOCD: CPT | Mod: CPTII,,, | Performed by: NURSE PRACTITIONER

## 2023-06-08 PROCEDURE — 3074F PR MOST RECENT SYSTOLIC BLOOD PRESSURE < 130 MM HG: ICD-10-PCS | Mod: CPTII,,, | Performed by: NURSE PRACTITIONER

## 2023-06-08 NOTE — PROGRESS NOTES
Patient ID: Ross Crystal is a 58 y.o. male.    Chief Complaint: Lymphocytosis          History     HPI    Ross Crystal is a 58yr old male, new to [facility] and this provider, referred for elevated wbc and anc.    Per review of labs, pt with intermittent elevations in wbc since .    Pt with T1DM, mixed HLD, chronic pain syndrome, recurrent depression.    Pt reports he has had frequent skin infections and can attribute some time frames when wbc elevated was related to this. Late Dec 22/2023 pt states in hospital several days related to fish hook injury and infection with noted wbc elevations.     Denies HA, dizziness, night sweats, fevers, weight loss, early satiety or loss of appetite, chest pain, abdominal pain, n/v/d, hematemesis, melena, hematuria, easy bruising. He does have intermittent clifford, chronic back pain. Weight stable, pt states he is in the 150s usually 152-155#.  Reports his diabetes is currently doing better, dexcom reading in clinic at present 126 g/dL.      Family history includes father with diagnosis throat cancer and  of complications related to this at age 62 yrs of age. No other cancer, blood disorder history.     Works full time as a .     Past medical, surgical, and medication history, past family history reviewed and uptdated with patient today as noted.    Past Medical History:   Diagnosis Date    Benign paroxysmal vertigo, bilateral     Diabetes mellitus     Diabetes mellitus type I     Hyperlipemia     Renal disorder     Urinary tract infection        Past Surgical History:   Procedure Laterality Date    CARPAL TUNNEL RELEASE  25 YEARS AGO    COLONOSCOPY      COLONOSCOPY N/A 2016    Procedure: COLONOSCOPY;  Surgeon: Luis Bogran-Reyes, MD;  [facility];  Service: Endoscopy;  Laterality: N/A;    DEBRIDEMENT, PHALANX, HAND Left 2022    Procedure: DEBRIDEMENT, PHALANX, HAND;  Surgeon: Abdon Reid Jr., MD;  Location: [facility] OR;  Service:  Orthopedics;  Laterality: Left;    ESOPHAGOGASTRODUODENOSCOPY      HERNIA REPAIR      HERNIA REPAIR         Oncology History:  Oncology History    No history exists.        Review of Systems:  Review of Systems   Constitutional:  Negative for chills, fatigue, fever and unexpected weight change.        Neg night sweats.   HENT:  Negative for nosebleeds.    Eyes:  Negative for visual disturbance.   Respiratory:  Positive for shortness of breath (ocassional when running with grandkids).    Cardiovascular:  Negative for chest pain and leg swelling.   Gastrointestinal:  Negative for abdominal pain, blood in stool, constipation, diarrhea, nausea and vomiting.   Genitourinary:  Negative for hematuria.   Musculoskeletal:  Positive for back pain (chronic, sees pain management).   Neurological:  Negative for dizziness, weakness and headaches.   Hematological:  Negative for adenopathy. Does not bruise/bleed easily.   Psychiatric/Behavioral:          Depression history, recurrent, reports doing well right now        Physical Exam   Vitals:  /70   Pulse 72   Wt 70.8 kg (155 lb 15.6 oz)   SpO2 95%   BMI 25.18 kg/m²     Labs:  Lab Results   Component Value Date    WBC 12.53 01/27/2023    HGB 15.5 01/27/2023    HCT 46.0 01/27/2023    MCV 85 01/27/2023     01/27/2023              Physical Exam:  Physical Exam  Vitals and nursing note reviewed.   Constitutional:       General: He is not in acute distress.     Appearance: He is normal weight. He is not ill-appearing, toxic-appearing or diaphoretic.   HENT:      Head: Normocephalic and atraumatic.      Mouth/Throat:      Mouth: Mucous membranes are moist.      Pharynx: Oropharynx is clear.   Eyes:      General: No scleral icterus.     Conjunctiva/sclera: Conjunctivae normal.   Cardiovascular:      Rate and Rhythm: Normal rate and regular rhythm.      Pulses: Normal pulses.      Heart sounds: Normal heart sounds. No murmur heard.  Pulmonary:      Effort: Pulmonary  effort is normal. No respiratory distress.      Breath sounds: Normal breath sounds.   Abdominal:      General: Bowel sounds are normal. There is no distension.      Palpations: Abdomen is soft. There is no mass.      Tenderness: There is no abdominal tenderness. There is no guarding.   Musculoskeletal:         General: No swelling or tenderness. Normal range of motion.      Cervical back: Normal range of motion and neck supple. No rigidity.      Right lower leg: No edema.      Left lower leg: No edema.   Lymphadenopathy:      Cervical: No cervical adenopathy.   Skin:     General: Skin is warm and dry.      Coloration: Skin is not jaundiced or pale.      Findings: No bruising.   Neurological:      Mental Status: He is alert and oriented to person, place, and time.      Gait: Gait normal.   Psychiatric:         Attention and Perception: Attention normal.         Mood and Affect: Mood normal.         Speech: Speech normal.         Behavior: Behavior normal.         Thought Content: Thought content normal.        ECOG:   ECOG SCORE    0 - Fully active-able to carry on all pre-disease performance without restriction            PROCEDURES/IMAGING      Discussion     Problem List:  Problem List Items Addressed This Visit          Oncology    Leukocytosis - Primary    Relevant Orders    CBC Auto Differential    Pathologist Interpretation Differential    Lactate Dehydrogenase    Uric Acid    Sedimentation rate    C-REACTIVE PROTEIN       Other    Tobacco abuse    Relevant Orders    CBC Auto Differential    Pathologist Interpretation Differential    Lactate Dehydrogenase    Uric Acid    Sedimentation rate    C-REACTIVE PROTEIN      Lymphocytosis, elevated ANC  - Intermittent elevations of wbc, anc since 2015. There is some correlation pt reports with frequent infection issues.  - Pt has had frequent skin infections being a  along with diabetes, harder time healing. Dec 22/Jan 23 hospitalization with wbc  elevations 2/2 fish hook injury and infection.  - Pt smokes 2- 2.5 ppd of cigarettes, there is demonstration in studies of lymphocytosis due to this.  - No b symptoms.   - Will trend cbc, complete patho review and order inflammatory markers. Repeat labs in 3 months.     Tobacco abuse  - 2 to 2.5 ppd  - pt reports no interest in cessation, encouraged  - this can directly affect cbc indices which was explained to pt including increased risk for atherosclerosis, pvc, copd, cvd.  - continue to monitor    Mixed HLD  - tolerating atorvastatin  - management deferred to cardiology    Advance Care Planning     Date: 06/08/2023     Patient did not wish or was not able to name a surrogate decision maker or provide an Advance Care Plan.               Janey Lua, SILVIA-C  [facility]  Hematology/Oncology  [address]  [place]  [id number] [phone]

## 2023-06-12 ENCOUNTER — TELEPHONE (OUTPATIENT)
Dept: HEMATOLOGY/ONCOLOGY | Facility: CLINIC | Age: 58
End: 2023-06-12
Payer: MEDICAID

## 2023-06-12 NOTE — TELEPHONE ENCOUNTER
Reviewed labs with pt, normal wbc on cbc at present, normal inflammatory markers, normal path review. Will trend labs, next lab and clinic appts 9/2023. Questions answered. Pt verbalizes understanding.

## 2023-08-24 ENCOUNTER — TELEPHONE (OUTPATIENT)
Dept: HEMATOLOGY/ONCOLOGY | Facility: CLINIC | Age: 58
End: 2023-08-24
Payer: MEDICAID

## 2023-08-24 NOTE — TELEPHONE ENCOUNTER
Spoke with the pt about his appointment being changed to the 6th of September.The pt verbalized understanding the change

## 2023-09-06 ENCOUNTER — OFFICE VISIT (OUTPATIENT)
Dept: HEMATOLOGY/ONCOLOGY | Facility: CLINIC | Age: 58
End: 2023-09-06
Payer: MEDICAID

## 2023-09-06 VITALS
HEART RATE: 76 BPM | DIASTOLIC BLOOD PRESSURE: 75 MMHG | SYSTOLIC BLOOD PRESSURE: 110 MMHG | OXYGEN SATURATION: 94 % | BODY MASS INDEX: 25.6 KG/M2 | WEIGHT: 158.63 LBS

## 2023-09-06 DIAGNOSIS — D72.829 LEUKOCYTOSIS, UNSPECIFIED TYPE: Primary | ICD-10-CM

## 2023-09-06 PROCEDURE — 3052F PR MOST RECENT HEMOGLOBIN A1C LEVEL 8.0 - < 9.0%: ICD-10-PCS | Mod: CPTII,,, | Performed by: NURSE PRACTITIONER

## 2023-09-06 PROCEDURE — 99214 OFFICE O/P EST MOD 30 MIN: CPT | Mod: PBBFAC,PN | Performed by: NURSE PRACTITIONER

## 2023-09-06 PROCEDURE — 1159F PR MEDICATION LIST DOCUMENTED IN MEDICAL RECORD: ICD-10-PCS | Mod: CPTII,,, | Performed by: NURSE PRACTITIONER

## 2023-09-06 PROCEDURE — 3074F PR MOST RECENT SYSTOLIC BLOOD PRESSURE < 130 MM HG: ICD-10-PCS | Mod: CPTII,,, | Performed by: NURSE PRACTITIONER

## 2023-09-06 PROCEDURE — 3052F HG A1C>EQUAL 8.0%<EQUAL 9.0%: CPT | Mod: CPTII,,, | Performed by: NURSE PRACTITIONER

## 2023-09-06 PROCEDURE — 99214 PR OFFICE/OUTPT VISIT, EST, LEVL IV, 30-39 MIN: ICD-10-PCS | Mod: S$PBB,,, | Performed by: NURSE PRACTITIONER

## 2023-09-06 PROCEDURE — 99999 PR PBB SHADOW E&M-EST. PATIENT-LVL IV: CPT | Mod: PBBFAC,,, | Performed by: NURSE PRACTITIONER

## 2023-09-06 PROCEDURE — 1160F RVW MEDS BY RX/DR IN RCRD: CPT | Mod: CPTII,,, | Performed by: NURSE PRACTITIONER

## 2023-09-06 PROCEDURE — 99999 PR PBB SHADOW E&M-EST. PATIENT-LVL IV: ICD-10-PCS | Mod: PBBFAC,,, | Performed by: NURSE PRACTITIONER

## 2023-09-06 PROCEDURE — 1160F PR REVIEW ALL MEDS BY PRESCRIBER/CLIN PHARMACIST DOCUMENTED: ICD-10-PCS | Mod: CPTII,,, | Performed by: NURSE PRACTITIONER

## 2023-09-06 PROCEDURE — 99214 OFFICE O/P EST MOD 30 MIN: CPT | Mod: S$PBB,,, | Performed by: NURSE PRACTITIONER

## 2023-09-06 PROCEDURE — 3074F SYST BP LT 130 MM HG: CPT | Mod: CPTII,,, | Performed by: NURSE PRACTITIONER

## 2023-09-06 PROCEDURE — 3078F PR MOST RECENT DIASTOLIC BLOOD PRESSURE < 80 MM HG: ICD-10-PCS | Mod: CPTII,,, | Performed by: NURSE PRACTITIONER

## 2023-09-06 PROCEDURE — 3008F BODY MASS INDEX DOCD: CPT | Mod: CPTII,,, | Performed by: NURSE PRACTITIONER

## 2023-09-06 PROCEDURE — 3078F DIAST BP <80 MM HG: CPT | Mod: CPTII,,, | Performed by: NURSE PRACTITIONER

## 2023-09-06 PROCEDURE — 3008F PR BODY MASS INDEX (BMI) DOCUMENTED: ICD-10-PCS | Mod: CPTII,,, | Performed by: NURSE PRACTITIONER

## 2023-09-06 PROCEDURE — 1159F MED LIST DOCD IN RCRD: CPT | Mod: CPTII,,, | Performed by: NURSE PRACTITIONER

## 2023-09-06 NOTE — PROGRESS NOTES
"  Patient ID: Ross Crystal is a 58 y.o. male.    Chief Complaint: Leukocytosis          History     HPI    Ross Crystal is a 58yr old male, new to [facility] and this provider, referred for elevated wbc and anc.    Per review of labs, pt with intermittent elevations in wbc since .    Pt with T1DM, mixed HLD, chronic pain syndrome, recurrent depression.    Pt reports he has had frequent skin infections and can attribute some time frames when wbc elevated was related to this. Late Dec 22/2023 pt states in hospital several days related to fish hook injury and infection with noted wbc elevations.     Denies HA, dizziness, night sweats, fevers, weight loss, early satiety or loss of appetite, chest pain, abdominal pain, n/v/d, hematemesis, melena, hematuria, easy bruising. He does have intermittent clifford, chronic back pain. Weight stable, pt states he is in the 150s usually 152-155#.  Reports his diabetes is currently doing better, dexcom reading in clinic at present 126 g/dL.      Family history includes father with diagnosis throat cancer and  of complications related to this at age 62 yrs of age. No other cancer, blood disorder history.     Works full time as a .     INTERVAL  - pt here for leukocytosis follow up  - reports intermittent fatigue, stays active getting out with grandkids for volleyball and football. Heat has limited how much he has felt like being out in boat fishing.  - Denies HA, dizziness, night sweats, fevers, weight loss, early satiety or loss of appetite, chest pain, abdominal pain, n/v/d, hematemesis, melena, hematuria, easy bruising.   - continues with around the clock blood sugar monitoring, 160s to 170s he states "and that is without taking all that insulin they want"; states he has taken "few units less than what they say, don't like when it drops too low"; encouraged close follow up with his endocrinologist to discuss this      Past medical, surgical, and medication " history, past family history reviewed and uptdated with patient today as noted.    Past Medical History:   Diagnosis Date    Benign paroxysmal vertigo, bilateral     Diabetes mellitus     Diabetes mellitus type I     Hyperlipemia     Renal disorder     Urinary tract infection        Past Surgical History:   Procedure Laterality Date    CARPAL TUNNEL RELEASE  25 YEARS AGO    COLONOSCOPY      COLONOSCOPY N/A 1/25/2016    Procedure: COLONOSCOPY;  Surgeon: Luis Bogran-Reyes, MD;  Location: [facility];  Service: Endoscopy;  Laterality: N/A;    DEBRIDEMENT, PHALANX, HAND Left 12/29/2022    Procedure: DEBRIDEMENT, PHALANX, HAND;  Surgeon: Abdon Reid Jr., MD;  Location: [facility];  Service: Orthopedics;  Laterality: Left;    ESOPHAGOGASTRODUODENOSCOPY      HERNIA REPAIR      HERNIA REPAIR         Oncology History:  Oncology History    No history exists.        Review of Systems:  Review of Systems   Constitutional:  Negative for chills, fatigue, fever and unexpected weight change.        Neg night sweats.   HENT:  Negative for nosebleeds.    Eyes:  Negative for visual disturbance.   Respiratory:  Positive for shortness of breath (ocassional when running with grandkids).    Cardiovascular:  Negative for chest pain and leg swelling.   Gastrointestinal:  Negative for abdominal pain, blood in stool, constipation, diarrhea, nausea and vomiting.   Genitourinary:  Negative for hematuria.   Musculoskeletal:  Positive for back pain (chronic, sees pain management).   Neurological:  Negative for dizziness, weakness and headaches.   Hematological:  Negative for adenopathy. Does not bruise/bleed easily.   Psychiatric/Behavioral:          Depression history, recurrent, reports doing well right now          Physical Exam   Vitals:  /75   Pulse 76   Wt 72 kg (158 lb 9.9 oz)   SpO2 (!) 94%   BMI 25.60 kg/m²     Labs:  Lab Results   Component Value Date    WBC 11.09 09/05/2023    HGB 15.4 09/05/2023    HCT 47.4 09/05/2023    MCV  87 09/05/2023     09/05/2023              Physical Exam:  Physical Exam  Vitals and nursing note reviewed.   Constitutional:       General: He is not in acute distress.     Appearance: He is normal weight. He is not ill-appearing, toxic-appearing or diaphoretic.   HENT:      Head: Normocephalic and atraumatic.      Mouth/Throat:      Mouth: Mucous membranes are moist.      Pharynx: Oropharynx is clear.   Eyes:      General: No scleral icterus.     Conjunctiva/sclera: Conjunctivae normal.   Cardiovascular:      Rate and Rhythm: Normal rate and regular rhythm.      Pulses: Normal pulses.      Heart sounds: Normal heart sounds. No murmur heard.  Pulmonary:      Effort: Pulmonary effort is normal. No respiratory distress.      Breath sounds: Normal breath sounds.   Abdominal:      General: Bowel sounds are normal. There is no distension.      Palpations: Abdomen is soft. There is no mass.      Tenderness: There is no abdominal tenderness. There is no guarding.   Musculoskeletal:         General: No swelling or tenderness. Normal range of motion.      Cervical back: Normal range of motion and neck supple. No rigidity.      Right lower leg: No edema.      Left lower leg: No edema.   Lymphadenopathy:      Head:      Right side of head: No submental, submandibular, tonsillar, preauricular, posterior auricular or occipital adenopathy.      Left side of head: No submental, submandibular, tonsillar, preauricular, posterior auricular or occipital adenopathy.      Cervical: No cervical adenopathy.      Right cervical: No superficial, deep or posterior cervical adenopathy.     Left cervical: No superficial, deep or posterior cervical adenopathy.      Upper Body:      Right upper body: No supraclavicular, axillary, pectoral or epitrochlear adenopathy.      Left upper body: No supraclavicular, axillary, pectoral or epitrochlear adenopathy.   Skin:     General: Skin is warm and dry.      Coloration: Skin is not jaundiced or  pale.      Findings: No bruising.   Neurological:      Mental Status: He is alert and oriented to person, place, and time.      Gait: Gait normal.   Psychiatric:         Attention and Perception: Attention normal.         Mood and Affect: Mood normal.         Speech: Speech normal.         Behavior: Behavior normal.         Thought Content: Thought content normal.          ECOG:   ECOG SCORE    0 - Fully active-able to carry on all pre-disease performance without restriction            PROCEDURES/IMAGING      Discussion     Problem List:  Problem List Items Addressed This Visit          Oncology    Leukocytosis - Primary    Relevant Orders    CMP        Lymphocytosis, elevated ANC  - Intermittent elevations of wbc, anc since 2015. There is some correlation pt reports with frequent infection issues.  - Pt has had frequent skin infections being a  along with diabetes, harder time healing. Dec 22/Zach 23 hospitalization with wbc elevations 2/2 fish hook injury and infection.  - Pt smokes 2- 2.5 ppd of cigarettes, there is demonstration in studies of lymphocytosis due to this.  - No b symptoms.   - Will trend cbc, complete patho review and order inflammatory markers.   - 6/8/23 wbc normal 11.76 K/uL, normal path review  - 9/5/23 wbc normal 11.09 k/uL, immature grans 0.6%, path review with inflammatory pattern  - rtc 4 months, if remain stable will put on 6 month follow up schedule    Tobacco abuse  - 2 to 2.5 ppd  - pt reports no interest in cessation, encouraged  - this can directly affect cbc indices which was explained to pt including increased risk for atherosclerosis, pvc, copd, cvd.  - continue to monitor    Mixed HLD  - tolerating atorvastatin  - management deferred to cardiology    Diabetes with insulin  - as above improved blood sugars  - pt has reduced his insulin to what he feels is a comfortable dosing  - advised close follow up with endocrinologist for this, states he has decided only  seeing his pcp for his diabetes and has appt tomorrow    Advance Care Planning     Date: 06/08/2023     Patient did not wish or was not able to name a surrogate decision maker or provide an Advance Care Plan.               Janey Lua, NP-C  [facility]  Hematology/Oncology  [address] [address]  [place]  [id number]  [phone]

## 2024-01-18 ENCOUNTER — OFFICE VISIT (OUTPATIENT)
Dept: HEMATOLOGY/ONCOLOGY | Facility: CLINIC | Age: 59
End: 2024-01-18
Payer: MEDICAID

## 2024-01-18 VITALS
OXYGEN SATURATION: 96 % | WEIGHT: 154.75 LBS | TEMPERATURE: 98 F | SYSTOLIC BLOOD PRESSURE: 124 MMHG | RESPIRATION RATE: 20 BRPM | BODY MASS INDEX: 25.78 KG/M2 | HEART RATE: 79 BPM | DIASTOLIC BLOOD PRESSURE: 74 MMHG | HEIGHT: 65 IN

## 2024-01-18 DIAGNOSIS — Z72.0 TOBACCO ABUSE: ICD-10-CM

## 2024-01-18 DIAGNOSIS — Z79.4 TYPE 2 DIABETES MELLITUS WITHOUT COMPLICATION, WITH LONG-TERM CURRENT USE OF INSULIN: ICD-10-CM

## 2024-01-18 DIAGNOSIS — D72.829 LEUKOCYTOSIS, UNSPECIFIED TYPE: Primary | ICD-10-CM

## 2024-01-18 DIAGNOSIS — E78.2 MIXED HYPERLIPIDEMIA: ICD-10-CM

## 2024-01-18 DIAGNOSIS — E11.9 TYPE 2 DIABETES MELLITUS WITHOUT COMPLICATION, WITH LONG-TERM CURRENT USE OF INSULIN: ICD-10-CM

## 2024-01-18 PROCEDURE — 99215 OFFICE O/P EST HI 40 MIN: CPT | Mod: PBBFAC,PO | Performed by: NURSE PRACTITIONER

## 2024-01-18 PROCEDURE — 3008F BODY MASS INDEX DOCD: CPT | Mod: CPTII,,, | Performed by: NURSE PRACTITIONER

## 2024-01-18 PROCEDURE — 1160F RVW MEDS BY RX/DR IN RCRD: CPT | Mod: CPTII,,, | Performed by: NURSE PRACTITIONER

## 2024-01-18 PROCEDURE — 1159F MED LIST DOCD IN RCRD: CPT | Mod: CPTII,,, | Performed by: NURSE PRACTITIONER

## 2024-01-18 PROCEDURE — 99999 PR PBB SHADOW E&M-EST. PATIENT-LVL V: CPT | Mod: PBBFAC,,, | Performed by: NURSE PRACTITIONER

## 2024-01-18 PROCEDURE — 3078F DIAST BP <80 MM HG: CPT | Mod: CPTII,,, | Performed by: NURSE PRACTITIONER

## 2024-01-18 PROCEDURE — 99214 OFFICE O/P EST MOD 30 MIN: CPT | Mod: S$PBB,,, | Performed by: NURSE PRACTITIONER

## 2024-01-18 PROCEDURE — 3074F SYST BP LT 130 MM HG: CPT | Mod: CPTII,,, | Performed by: NURSE PRACTITIONER

## 2024-01-18 NOTE — PROGRESS NOTES
"  Patient ID: Ross Crystal is a 58 y.o. male.    Chief Complaint: Leukocytosis          History     HPI    Ross Crystal is a 58yr old male, new to [facility] and this provider, referred for elevated wbc and anc.    Per review of labs, pt with intermittent elevations in wbc since .    Pt with T1DM, mixed HLD, chronic pain syndrome, recurrent depression.    Pt reports he has had frequent skin infections and can attribute some time frames when wbc elevated was related to this. Late Dec 22/2023 pt states in hospital several days related to fish hook injury and infection with noted wbc elevations.     Denies HA, dizziness, night sweats, fevers, weight loss, early satiety or loss of appetite, chest pain, abdominal pain, n/v/d, hematemesis, melena, hematuria, easy bruising. He does have intermittent clifford, chronic back pain. Weight stable, pt states he is in the 150s usually 152-155#.  Reports his diabetes is currently doing better, dexcom reading in clinic at present 126 g/dL.      Family history includes father with diagnosis throat cancer and  of complications related to this at age 62 yrs of age. No other cancer, blood disorder history.     Works full time as a .     INTERVAL  - pt here for leukocytosis follow up  - Denies fatigue today. 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  - Has been dealing with left middle finger infection and on oral antibiotics the last week, drained of pus, "got a piece of shrimp stuck in there".   - Denies HA, dizziness, night sweats, fevers, weight loss, early satiety or loss of appetite, lymph node enlargement, chest pain, abdominal pain, n/v/d, hematemesis, melena, hematuria, easy bruising.   - continues with around the clock blood sugar monitoring, he reports avg of 170s and states "not taking that long acting like they want, they want 80u and I am only taking 30-25u because I drop quickly to 70s and below at night", took A1c this morning for " "pcp and pending results; 3 months ago "over 8". Encouraged close follow up with his endocrinologist.  - working on smoking cessation, did not tolerate chantix and had "gun thoughts, stopped that medicine"    Past medical, surgical, and medication history, past family history reviewed and uptdated with patient today as noted.    Past Medical History:   Diagnosis Date    Benign paroxysmal vertigo, bilateral     Diabetes mellitus     Diabetes mellitus type I     Hyperlipemia     Renal disorder     Urinary tract infection        Past Surgical History:   Procedure Laterality Date    CARPAL TUNNEL RELEASE  25 YEARS AGO    COLONOSCOPY      COLONOSCOPY N/A 1/25/2016    Procedure: COLONOSCOPY;  Surgeon: Luis Bogran-Reyes, MD;  Location: [facility];  Service: Endoscopy;  Laterality: N/A;    DEBRIDEMENT, PHALANX, HAND Left 12/29/2022    Procedure: DEBRIDEMENT, PHALANX, HAND;  Surgeon: Abdon Reid Jr., MD;  Location: [facility];  Service: Orthopedics;  Laterality: Left;    ESOPHAGOGASTRODUODENOSCOPY      HERNIA REPAIR      HERNIA REPAIR         Oncology History:  Oncology History    No history exists.        Review of Systems:  Review of Systems   Constitutional:  Negative for chills, fatigue, fever and unexpected weight change.        Neg night sweats.   HENT:  Negative for nosebleeds.    Eyes:  Negative for visual disturbance.   Respiratory:  Positive for shortness of breath (ocassional when running with grandkids).    Cardiovascular:  Negative for chest pain and leg swelling.   Gastrointestinal:  Negative for abdominal pain, blood in stool, constipation, diarrhea, nausea and vomiting.   Genitourinary:  Negative for hematuria.   Musculoskeletal:  Positive for back pain (chronic, sees pain management).   Neurological:  Negative for dizziness, weakness and headaches.   Hematological:  Negative for adenopathy. Does not bruise/bleed easily.   Psychiatric/Behavioral:          Depression history, recurrent, reports doing well " "right now          Physical Exam   Vitals:  /74 (BP Location: Right arm, Patient Position: Sitting, BP Method: Medium (Automatic))   Pulse 79   Temp 97.5 °F (36.4 °C) (Oral)   Resp 20   Ht 5' 5" (1.651 m)   Wt 70.2 kg (154 lb 12.2 oz)   SpO2 96%   BMI 25.75 kg/m²     Labs:  Lab Results   Component Value Date    WBC 12.93 (H) 01/09/2024    HGB 16.5 01/09/2024    HCT 50.3 01/09/2024    MCV 87 01/09/2024     01/09/2024              Physical Exam:  Physical Exam  Vitals and nursing note reviewed.   Constitutional:       General: He is not in acute distress.     Appearance: He is normal weight. He is not ill-appearing, toxic-appearing or diaphoretic.   HENT:      Head: Normocephalic and atraumatic.      Mouth/Throat:      Mouth: Mucous membranes are moist.      Pharynx: Oropharynx is clear.   Eyes:      General: No scleral icterus.     Conjunctiva/sclera: Conjunctivae normal.   Cardiovascular:      Rate and Rhythm: Normal rate and regular rhythm.      Pulses: Normal pulses.      Heart sounds: Normal heart sounds. No murmur heard.  Pulmonary:      Effort: Pulmonary effort is normal. No respiratory distress.      Breath sounds: Normal breath sounds.   Abdominal:      General: Bowel sounds are normal. There is no distension.      Palpations: Abdomen is soft. There is no mass.      Tenderness: There is no abdominal tenderness. There is no guarding.   Musculoskeletal:         General: No swelling or tenderness. Normal range of motion.      Cervical back: Normal range of motion and neck supple. No rigidity.      Right lower leg: No edema.      Left lower leg: No edema.   Lymphadenopathy:      Head:      Right side of head: No submental, submandibular, tonsillar, preauricular, posterior auricular or occipital adenopathy.      Left side of head: No submental, submandibular, tonsillar, preauricular, posterior auricular or occipital adenopathy.      Cervical: No cervical adenopathy.      Right cervical: No " superficial, deep or posterior cervical adenopathy.     Left cervical: No superficial, deep or posterior cervical adenopathy.      Upper Body:      Right upper body: No supraclavicular, axillary, pectoral or epitrochlear adenopathy.      Left upper body: No supraclavicular, axillary, pectoral or epitrochlear adenopathy.   Skin:     General: Skin is warm and dry.      Coloration: Skin is not jaundiced or pale.      Findings: No bruising.   Neurological:      Mental Status: He is alert and oriented to person, place, and time.      Gait: Gait normal.   Psychiatric:         Attention and Perception: Attention normal.         Mood and Affect: Mood normal.         Speech: Speech normal.         Behavior: Behavior normal.         Thought Content: Thought content normal.          ECOG:   ECOG SCORE    0 - Fully active-able to carry on all pre-disease performance without restriction            PROCEDURES/IMAGING      Discussion     Problem List:  Problem List Items Addressed This Visit          Cardiac/Vascular    Mixed hyperlipidemia       Oncology    Leukocytosis - Primary       Endocrine    Type 2 diabetes mellitus without complication    Relevant Orders    CMP       Other    Tobacco abuse          Lymphocytosis, elevated ANC  - Intermittent elevations of wbc, anc since 2015. There is some correlation pt reports with frequent infection issues.  - Pt has had frequent skin infections being a  along with diabetes, harder time healing. Dec 22/Zach 23 hospitalization with wbc elevations 2/2 fish hook injury and infection.  - Pt smokes 2- 2.5 ppd of cigarettes, there is demonstration in studies of lymphocytosis due to this.  - No b symptoms.   - Will trend cbc, complete patho review and order inflammatory markers.   - 6/8/23 wbc normal 11.76 K/uL, normal path review  - 9/5/23 wbc normal 11.09 k/uL, immature grans 0.6%, path review with inflammatory pattern  - 12/7/23 path review normal, no leukocytosis  -  1/9/24 wbc 12.93, pt with today resolved left middle finger cellulitis, I&D done a week ago; no B symptoms  - rtc 3 months, if remain stable will put on 6 month follow up schedule    Tobacco abuse  - 2 to 2.5 ppd, he is trying to quit and states working with his pain management dr on this  - this can directly affect cbc indices which was explained to pt including increased risk for atherosclerosis, pvc, copd, cvd.  - continue to monitor    Mixed HLD  - tolerating atorvastatin  - management deferred to cardiology    Diabetes with insulin  - A1c completed today for pcp, pending results  - pt has reduced his insulin to what he feels is a comfortable dosing  - advised close follow up with endocrinologist for this, states he has decided only seeing his pcp for his diabetes and has appt tomorrow    Advance Care Planning     Date: 06/08/2023     Patient did not wish or was not able to name a surrogate decision maker or provide an Advance Care Plan.               Janey Lua, NP-C  [facility]  Hematology/Oncology  [address]  [place]  [id number]  [phone]

## 2024-02-06 ENCOUNTER — TELEPHONE (OUTPATIENT)
Dept: ORTHOPEDICS | Facility: CLINIC | Age: 59
End: 2024-02-06
Payer: MEDICAID

## 2024-02-06 NOTE — TELEPHONE ENCOUNTER
----- Message from Abdon Reid Jr., MD sent at 2/6/2024  2:36 PM CST -----  In 2-3 days  ----- Message -----  From: Annamaria Medina MA  Sent: 2/6/2024   9:54 AM CST  To: Abdon Reid Jr., MD    Good Morning,    How soon would you like patient to be seen?    ----- Message -----  From: Saniya Houston  Sent: 2/6/2024   9:40 AM CST  To: [facility] Staff    Type:  Needs Medical Advice    Who Called: Pt   Symptoms (please be specific): left hand pain finger / infection  How long has patient had these symptoms:  few months   Would the patient rather a call back or a response via [facility]? Callback   Best Call Back Number:  [phone]  Additional Information: Pt is requesting a callback from this provider office

## 2024-02-08 ENCOUNTER — OFFICE VISIT (OUTPATIENT)
Dept: ORTHOPEDICS | Facility: CLINIC | Age: 59
End: 2024-02-08
Payer: MEDICAID

## 2024-02-08 VITALS — BODY MASS INDEX: 24.99 KG/M2 | HEIGHT: 65 IN | WEIGHT: 150 LBS

## 2024-02-08 DIAGNOSIS — L02.512 ABSCESS OF FINGER OF LEFT HAND: ICD-10-CM

## 2024-02-08 DIAGNOSIS — L02.512 PULP ABSCESS OF FINGER OF LEFT HAND: ICD-10-CM

## 2024-02-08 DIAGNOSIS — L02.512 ABSCESS OF LEFT HAND: ICD-10-CM

## 2024-02-08 DIAGNOSIS — M19.049 HAND ARTHRITIS: Primary | ICD-10-CM

## 2024-02-08 PROCEDURE — 3008F BODY MASS INDEX DOCD: CPT | Mod: CPTII,,, | Performed by: ORTHOPAEDIC SURGERY

## 2024-02-08 PROCEDURE — 99999 PR PBB SHADOW E&M-EST. PATIENT-LVL III: CPT | Mod: PBBFAC,,, | Performed by: ORTHOPAEDIC SURGERY

## 2024-02-08 PROCEDURE — 3052F HG A1C>EQUAL 8.0%<EQUAL 9.0%: CPT | Mod: CPTII,,, | Performed by: ORTHOPAEDIC SURGERY

## 2024-02-08 PROCEDURE — 99213 OFFICE O/P EST LOW 20 MIN: CPT | Mod: PBBFAC,PN | Performed by: ORTHOPAEDIC SURGERY

## 2024-02-08 PROCEDURE — 1159F MED LIST DOCD IN RCRD: CPT | Mod: CPTII,,, | Performed by: ORTHOPAEDIC SURGERY

## 2024-02-08 PROCEDURE — 99213 OFFICE O/P EST LOW 20 MIN: CPT | Mod: S$PBB,,, | Performed by: ORTHOPAEDIC SURGERY

## 2024-02-08 RX ORDER — CEFAZOLIN SODIUM 2 G/50ML
2 SOLUTION INTRAVENOUS
Status: CANCELLED | OUTPATIENT
Start: 2024-02-08

## 2024-02-08 RX ORDER — TRIAMCINOLONE ACETONIDE 40 MG/ML
20 INJECTION, SUSPENSION INTRA-ARTICULAR; INTRAMUSCULAR
Status: COMPLETED | OUTPATIENT
Start: 2024-02-08 | End: 2024-02-08

## 2024-02-08 RX ADMIN — TRIAMCINOLONE ACETONIDE 20 MG: 40 INJECTION, SUSPENSION INTRA-ARTICULAR; INTRAMUSCULAR at 10:02

## 2024-02-08 NOTE — PROGRESS NOTES
CC:  Abscess/cellulitis chronic infection left middle finger        HPI:  Ross Crystal is a very pleasant 58 y.o. male with painful swelling of the left middle finger for the past several months   He believes he got stuck with a shrimp spine about 2-3 months ago   Works as a  so we mostly treats himself but these symptoms have persisted   He has had some purulent drainage and actually went to the emergency room for this at least once was started on oral antibiotics         PAST MEDICAL HISTORY:   Past Medical History:   Diagnosis Date    Benign paroxysmal vertigo, bilateral     Diabetes mellitus     Diabetes mellitus type I     Hyperlipemia     Renal disorder     Urinary tract infection      PAST SURGICAL HISTORY:   Past Surgical History:   Procedure Laterality Date    CARPAL TUNNEL RELEASE  25 YEARS AGO    COLONOSCOPY      COLONOSCOPY N/A 1/25/2016    Procedure: COLONOSCOPY;  Surgeon: Luis Bogran-Reyes, MD;  Location: [facility];  Service: Endoscopy;  Laterality: N/A;    DEBRIDEMENT, PHALANX, HAND Left 12/29/2022    Procedure: DEBRIDEMENT, PHALANX, HAND;  Surgeon: Abdon Reid Jr., MD;  Location: [facility];  Service: Orthopedics;  Laterality: Left;    ESOPHAGOGASTRODUODENOSCOPY      HERNIA REPAIR      HERNIA REPAIR       FAMILY HISTORY:   Family History   Problem Relation Age of Onset    Alzheimer's disease Mother     Cancer Father         throat cancer    Prostate cancer Neg Hx     Kidney disease Neg Hx      SOCIAL HISTORY:   Social History     Socioeconomic History    Marital status: Single    Years of education: 9th   Tobacco Use    Smoking status: Every Day     Current packs/day: 2.50     Average packs/day: 2.5 packs/day for 35.1 years (87.8 ttl pk-yrs)     Types: Cigarettes     Start date: 1989    Smokeless tobacco: Never    Tobacco comments:     Pt declines referral to Ambulatory Smoking Cessation clinic, stating that he would like to attempt quitting on his own first. Handout provided.  "  Substance and Sexual Activity    Alcohol use: No     Alcohol/week: 0.0 standard drinks of alcohol    Drug use: No     Comment: 30YEARS AGO, PATIENT ON PRESCRIBED PAIN MEDICATION.     Sexual activity: Not Currently     Social Determinants of Health     Financial Resource Strain: Low Risk  (4/28/2022)    Overall Financial Resource Strain (CARDIA)     Difficulty of Paying Living Expenses: Not very hard   Food Insecurity: No Food Insecurity (4/28/2022)    Hunger Vital Sign     Worried About Running Out of Food in the Last Year: Never true     Ran Out of Food in the Last Year: Never true   Transportation Needs: No Transportation Needs (4/28/2022)    PRAPARE - Transportation     Lack of Transportation (Medical): No     Lack of Transportation (Non-Medical): No   Physical Activity: Inactive (4/28/2022)    Exercise Vital Sign     Days of Exercise per Week: 0 days     Minutes of Exercise per Session: 0 min   Stress: No Stress Concern Present (4/28/2022)    [place] [employer] - Occupational Stress Questionnaire     Feeling of Stress : Only a little   Social Connections: Socially Isolated (4/28/2022)    Social Connection and Isolation Panel [NHANES]     Frequency of Communication with Friends and Family: More than three times a week     Frequency of Social Gatherings with Friends and Family: More than three times a week     Attends Mandaeism Services: Never     Active Member of Clubs or Organizations: No     Attends Club or Organization Meetings: Never     Marital Status: Never    Housing Stability: Unknown (4/28/2022)    Housing Stability Vital Sign     Unable to Pay for Housing in the Last Year: No     Unstable Housing in the Last Year: No       MEDICATIONS:   Current Outpatient Medications:     aspirin (ECOTRIN) 81 MG EC tablet, Take 81 mg by mouth once daily., Disp: , Rfl:     BD ULTRA-FINE JENNY PEN NEEDLE 32 gauge x 5/32" Ndle, 4 (four) times daily., Disp: , Rfl:     cephALEXin (KEFLEX) 500 " MG capsule, Take 1 capsule (500 mg total) by mouth 4 (four) times daily. for 5 days, Disp: 20 capsule, Rfl: 0    DEXCOM G6  Misc, , Disp: , Rfl:     DEXCOM G6 SENSOR Sadaf, SMARTSI Each Topical Every 10 Days, Disp: , Rfl:     DEXCOM G6 TRANSMITTER Sadaf, , Disp: , Rfl:     fish oil-omega-3 fatty acids 300-1,000 mg capsule, Take 1 capsule by mouth 2 (two) times a day., Disp: , Rfl:     hydrocodone-acetaminophen 10-325mg (NORCO)  mg Tab, Take 1 tablet by mouth 2 (two) times a day., Disp: , Rfl:     insulin glargine 100 units/mL SubQ pen, Inject 45 Units into the skin., Disp: , Rfl:     insulin syringe-needle U-100 1 mL 31 gauge x 5/16 Syrg, , Disp: , Rfl:     LEVEMIR FLEXTOUCH U-100 INSULN 100 unit/mL (3 mL) InPn pen, Inject 35 Units into the skin 2 (two) times daily., Disp: , Rfl:     meclizine (ANTIVERT) 50 MG tablet, Take 50 mg by mouth 2 (two) times daily., Disp: , Rfl:     meloxicam (MOBIC) 15 MG tablet, Take 15 mg by mouth., Disp: , Rfl:     naproxen (NAPROSYN) 500 MG tablet, Take 1 tablet (500 mg total) by mouth 2 (two) times daily with meals., Disp: 20 tablet, Rfl: 0    nicotine (NICODERM CQ) 21 mg/24 hr, Place 1 patch onto the skin once daily., Disp: 30 patch, Rfl: 3    NOVOLOG FLEXPEN U-100 INSULIN 100 unit/mL (3 mL) InPn pen, SMARTSI-12 Unit(s) SUB-Q As Directed, Disp: , Rfl:     pravastatin (PRAVACHOL) 40 MG tablet, Take 40 mg by mouth every evening., Disp: , Rfl:     tamsulosin (FLOMAX) 0.4 mg Cap, Take 0.4 mg by mouth once daily., Disp: , Rfl:     atorvastatin (LIPITOR) 20 MG tablet, Take 20 mg by mouth., Disp: , Rfl:   No current facility-administered medications for this visit.  ALLERGIES:   Review of patient's allergies indicates:   Allergen Reactions    Sulfamethoxazole      Other Reaction(s): hives rash    Trimethoprim      Other Reaction(s): hives rash    Bactrim [sulfamethoxazole-trimethoprim] Rash       Review of Systems:  Constitutional: no fever or chills  ENT: no nasal  "congestion or sore throat  Respiratory: no cough or shortness of breath  Cardiovascular: no chest pain or palpitations  Gastrointestinal: no nausea or vomiting, PUD, GERD, NSAID intolerance  Genitourinary: no hematuria or dysuria  Integument/Breast: no rash or pruritis  Hematologic/Lymphatic: no easy bruising or lymphadenopathy  Musculoskeletal: see HPI  Neurological: no seizures or tremors  Behavioral/Psych: no auditory or visual hallucinations      Physical Exam   Vitals:    02/08/24 1022   Weight: 68 kg (150 lb)   Height: 5' 5" (1.651 m)   PainSc:   2       Constitutional: Oriented to person, place, and time. Appears well-developed and well-nourished.   HENT:   Head: Normocephalic and atraumatic.   Nose: Nose normal.   Eyes: No scleral icterus.   Neck: Normal range of motion.   Cardiovascular: Normal rate and regular rhythm.    Pulses:       Radial pulses are 2+ on the right side, and 2+ on the left side.   Pulmonary/Chest: Effort normal and breath sounds normal.   Abdominal: Soft.   Neurological: Alert and oriented to person, place, and time.   Skin: Skin is warm.   Psychiatric: Normal mood and affect.     MUSCULOSKELETAL UPPER EXTREMITY:  Examination left middle finger there is dorsal swelling over the middle phalanx several small sinus tracts were noted there was no purulence no drainage at the present time   Mild diffuse tenderness               Diagnostic Studies:  None        Assessment:  Possible mycobacterial infection left middle finger subacute    Plan:  Because of the nature of this problem I have recommended surgical debridement at which time we will send cultures for mycobacterial and tissue culture as well   Patient would like to set this up as an outpatient surgery      The risks and benefits of surgery were discussed with the patient today and they understand.  The consent was signed in the office for surgery.      Abdon Reid MD (Jay)  [facility]  Orthopedic Upper Extremity " Surgery

## 2024-02-08 NOTE — H&P (VIEW-ONLY)
CC:  Abscess/cellulitis chronic infection left middle finger        HPI:  Ross Crystal is a very pleasant 58 y.o. male with painful swelling of the left middle finger for the past several months   He believes he got stuck with a shrimp spine about 2-3 months ago   Works as a  so we mostly treats himself but these symptoms have persisted   He has had some purulent drainage and actually went to the emergency room for this at least once was started on oral antibiotics         PAST MEDICAL HISTORY:   Past Medical History:   Diagnosis Date    Benign paroxysmal vertigo, bilateral     Diabetes mellitus     Diabetes mellitus type I     Hyperlipemia     Renal disorder     Urinary tract infection      PAST SURGICAL HISTORY:   Past Surgical History:   Procedure Laterality Date    CARPAL TUNNEL RELEASE  25 YEARS AGO    COLONOSCOPY      COLONOSCOPY N/A 1/25/2016    Procedure: COLONOSCOPY;  Surgeon: Luis Bogran-Reyes, MD;  Location: [facility];  Service: Endoscopy;  Laterality: N/A;    DEBRIDEMENT, PHALANX, HAND Left 12/29/2022    Procedure: DEBRIDEMENT, PHALANX, HAND;  Surgeon: Abdon Reid Jr., MD;  Location: [facility];  Service: Orthopedics;  Laterality: Left;    ESOPHAGOGASTRODUODENOSCOPY      HERNIA REPAIR      HERNIA REPAIR       FAMILY HISTORY:   Family History   Problem Relation Age of Onset    Alzheimer's disease Mother     Cancer Father         throat cancer    Prostate cancer Neg Hx     Kidney disease Neg Hx      SOCIAL HISTORY:   Social History     Socioeconomic History    Marital status: Single    Years of education: 9th   Tobacco Use    Smoking status: Every Day     Current packs/day: 2.50     Average packs/day: 2.5 packs/day for 35.1 years (87.8 ttl pk-yrs)     Types: Cigarettes     Start date: 1989    Smokeless tobacco: Never    Tobacco comments:     Pt declines referral to Ambulatory Smoking Cessation clinic, stating that he would like to attempt quitting on his own first. Handout provided.  "  Substance and Sexual Activity    Alcohol use: No     Alcohol/week: 0.0 standard drinks of alcohol    Drug use: No     Comment: 30YEARS AGO, PATIENT ON PRESCRIBED PAIN MEDICATION.     Sexual activity: Not Currently     Social Determinants of Health     Financial Resource Strain: Low Risk  (4/28/2022)    Overall Financial Resource Strain (CARDIA)     Difficulty of Paying Living Expenses: Not very hard   Food Insecurity: No Food Insecurity (4/28/2022)    Hunger Vital Sign     Worried About Running Out of Food in the Last Year: Never true     Ran Out of Food in the Last Year: Never true   Transportation Needs: No Transportation Needs (4/28/2022)    PRAPARE - Transportation     Lack of Transportation (Medical): No     Lack of Transportation (Non-Medical): No   Physical Activity: Inactive (4/28/2022)    Exercise Vital Sign     Days of Exercise per Week: 0 days     Minutes of Exercise per Session: 0 min   Stress: No Stress Concern Present (4/28/2022)    [employer] [facility] [employer] [facility] - Occupational Stress Questionnaire     Feeling of Stress : Only a little   Social Connections: Socially Isolated (4/28/2022)    Social Connection and Isolation Panel [NHANES]     Frequency of Communication with Friends and Family: More than three times a week     Frequency of Social Gatherings with Friends and Family: More than three times a week     Attends Quaker Services: Never     Active Member of Clubs or Organizations: No     Attends Club or Organization Meetings: Never     Marital Status: Never    Housing Stability: Unknown (4/28/2022)    Housing Stability Vital Sign     Unable to Pay for Housing in the Last Year: No     Unstable Housing in the Last Year: No       MEDICATIONS:   Current Outpatient Medications:     aspirin (ECOTRIN) 81 MG EC tablet, Take 81 mg by mouth once daily., Disp: , Rfl:     BD ULTRA-FINE JENNY PEN NEEDLE 32 gauge x 5/32" Ndle, 4 (four) times daily., Disp: , Rfl:     cephALEXin (KEFLEX) 500 " MG capsule, Take 1 capsule (500 mg total) by mouth 4 (four) times daily. for 5 days, Disp: 20 capsule, Rfl: 0    DEXCOM G6  Misc, , Disp: , Rfl:     DEXCOM G6 SENSOR Sadaf, SMARTSI Each Topical Every 10 Days, Disp: , Rfl:     DEXCOM G6 TRANSMITTER Sadaf, , Disp: , Rfl:     fish oil-omega-3 fatty acids 300-1,000 mg capsule, Take 1 capsule by mouth 2 (two) times a day., Disp: , Rfl:     hydrocodone-acetaminophen 10-325mg (NORCO)  mg Tab, Take 1 tablet by mouth 2 (two) times a day., Disp: , Rfl:     insulin glargine 100 units/mL SubQ pen, Inject 45 Units into the skin., Disp: , Rfl:     insulin syringe-needle U-100 1 mL 31 gauge x 5/16 Syrg, , Disp: , Rfl:     LEVEMIR FLEXTOUCH U-100 INSULN 100 unit/mL (3 mL) InPn pen, Inject 35 Units into the skin 2 (two) times daily., Disp: , Rfl:     meclizine (ANTIVERT) 50 MG tablet, Take 50 mg by mouth 2 (two) times daily., Disp: , Rfl:     meloxicam (MOBIC) 15 MG tablet, Take 15 mg by mouth., Disp: , Rfl:     naproxen (NAPROSYN) 500 MG tablet, Take 1 tablet (500 mg total) by mouth 2 (two) times daily with meals., Disp: 20 tablet, Rfl: 0    nicotine (NICODERM CQ) 21 mg/24 hr, Place 1 patch onto the skin once daily., Disp: 30 patch, Rfl: 3    NOVOLOG FLEXPEN U-100 INSULIN 100 unit/mL (3 mL) InPn pen, SMARTSI-12 Unit(s) SUB-Q As Directed, Disp: , Rfl:     pravastatin (PRAVACHOL) 40 MG tablet, Take 40 mg by mouth every evening., Disp: , Rfl:     tamsulosin (FLOMAX) 0.4 mg Cap, Take 0.4 mg by mouth once daily., Disp: , Rfl:     atorvastatin (LIPITOR) 20 MG tablet, Take 20 mg by mouth., Disp: , Rfl:   No current facility-administered medications for this visit.  ALLERGIES:   Review of patient's allergies indicates:   Allergen Reactions    Sulfamethoxazole      Other Reaction(s): hives rash    Trimethoprim      Other Reaction(s): hives rash    Bactrim [sulfamethoxazole-trimethoprim] Rash       Review of Systems:  Constitutional: no fever or chills  ENT: no nasal  "congestion or sore throat  Respiratory: no cough or shortness of breath  Cardiovascular: no chest pain or palpitations  Gastrointestinal: no nausea or vomiting, PUD, GERD, NSAID intolerance  Genitourinary: no hematuria or dysuria  Integument/Breast: no rash or pruritis  Hematologic/Lymphatic: no easy bruising or lymphadenopathy  Musculoskeletal: see HPI  Neurological: no seizures or tremors  Behavioral/Psych: no auditory or visual hallucinations      Physical Exam   Vitals:    02/08/24 1022   Weight: 68 kg (150 lb)   Height: 5' 5" (1.651 m)   PainSc:   2       Constitutional: Oriented to person, place, and time. Appears well-developed and well-nourished.   HENT:   Head: Normocephalic and atraumatic.   Nose: Nose normal.   Eyes: No scleral icterus.   Neck: Normal range of motion.   Cardiovascular: Normal rate and regular rhythm.    Pulses:       Radial pulses are 2+ on the right side, and 2+ on the left side.   Pulmonary/Chest: Effort normal and breath sounds normal.   Abdominal: Soft.   Neurological: Alert and oriented to person, place, and time.   Skin: Skin is warm.   Psychiatric: Normal mood and affect.     MUSCULOSKELETAL UPPER EXTREMITY:  Examination left middle finger there is dorsal swelling over the middle phalanx several small sinus tracts were noted there was no purulence no drainage at the present time   Mild diffuse tenderness               Diagnostic Studies:  None        Assessment:  Possible mycobacterial infection left middle finger subacute    Plan:  Because of the nature of this problem I have recommended surgical debridement at which time we will send cultures for mycobacterial and tissue culture as well   Patient would like to set this up as an outpatient surgery      The risks and benefits of surgery were discussed with the patient today and they understand.  The consent was signed in the office for surgery.      Abdon Reid MD (Jay)  [facility]  Orthopedic Upper Extremity " Surgery

## 2024-02-08 NOTE — PROGRESS NOTES
"Subjective:      Patient ID: Ross Crystal is a 58 y.o. male.  Chief Complaint: Pain of the Left Hand (Celluitis middle )      HPI  Ross Crystal is a  58 y.o. male presenting today for follow up of left hand symptoms.  He reports that he is ***.    Review of patient's allergies indicates:   Allergen Reactions    Sulfamethoxazole      Other Reaction(s): hives rash    Trimethoprim      Other Reaction(s): hives rash    Bactrim [sulfamethoxazole-trimethoprim] Rash         Current Outpatient Medications   Medication Sig Dispense Refill    aspirin (ECOTRIN) 81 MG EC tablet Take 81 mg by mouth once daily.      BD ULTRA-FINE JENNY PEN NEEDLE 32 gauge x 5/32" Ndle 4 (four) times daily.      cephALEXin (KEFLEX) 500 MG capsule Take 1 capsule (500 mg total) by mouth 4 (four) times daily. for 5 days 20 capsule 0    DEXCOM G6  Misc       DEXCOM G6 SENSOR Sadaf SMARTSI Each Topical Every 10 Days      DEXCOM G6 TRANSMITTER Sadaf       fish oil-omega-3 fatty acids 300-1,000 mg capsule Take 1 capsule by mouth 2 (two) times a day.      hydrocodone-acetaminophen 10-325mg (NORCO)  mg Tab Take 1 tablet by mouth 2 (two) times a day.      insulin glargine 100 units/mL SubQ pen Inject 45 Units into the skin.      insulin syringe-needle U-100 1 mL 31 gauge x 5/16 Syrg       LEVEMIR FLEXTOUCH U-100 INSULN 100 unit/mL (3 mL) InPn pen Inject 35 Units into the skin 2 (two) times daily.      meclizine (ANTIVERT) 50 MG tablet Take 50 mg by mouth 2 (two) times daily.      meloxicam (MOBIC) 15 MG tablet Take 15 mg by mouth.      naproxen (NAPROSYN) 500 MG tablet Take 1 tablet (500 mg total) by mouth 2 (two) times daily with meals. 20 tablet 0    nicotine (NICODERM CQ) 21 mg/24 hr Place 1 patch onto the skin once daily. 30 patch 3    NOVOLOG FLEXPEN U-100 INSULIN 100 unit/mL (3 mL) InPn pen SMARTSI-12 Unit(s) SUB-Q As Directed      pravastatin (PRAVACHOL) 40 MG tablet Take 40 mg by mouth every evening.      tamsulosin (FLOMAX) 0.4 mg " "Cap Take 0.4 mg by mouth once daily.      atorvastatin (LIPITOR) 20 MG tablet Take 20 mg by mouth.       No current facility-administered medications for this visit.       Past Medical History:   Diagnosis Date    Benign paroxysmal vertigo, bilateral     Diabetes mellitus     Diabetes mellitus type I     Hyperlipemia     Renal disorder     Urinary tract infection        Past Surgical History:   Procedure Laterality Date    CARPAL TUNNEL RELEASE  25 YEARS AGO    COLONOSCOPY      COLONOSCOPY N/A 1/25/2016    Procedure: COLONOSCOPY;  Surgeon: Luis Bogran-Reyes, MD;  Location: [facility];  Service: Endoscopy;  Laterality: N/A;    DEBRIDEMENT, PHALANX, HAND Left 12/29/2022    Procedure: DEBRIDEMENT, PHALANX, HAND;  Surgeon: Abdon Reid Jr., MD;  Location: [facility];  Service: Orthopedics;  Laterality: Left;    ESOPHAGOGASTRODUODENOSCOPY      HERNIA REPAIR      HERNIA REPAIR         OBJECTIVE:   PHYSICAL EXAM:  Height: 5' 5" (165.1 cm) Weight: 68 kg (150 lb)  Vitals:    02/08/24 1022   Weight: 68 kg (150 lb)   Height: 5' 5" (1.651 m)   PainSc:   2     Ortho/SPM Exam  ***    RADIOGRAPHS:  ***  Comments: I have personally reviewed the imaging and I agree with the above radiologist's report.    ASSESSMENT/PLAN:     IMPRESSION: ***    PLAN: ***    FOLLOW UP: ***    Disclaimer: This note has been generated using voice-recognition software. There may be typographical errors that have been missed during proof-reading.     "

## 2024-02-09 ENCOUNTER — TELEPHONE (OUTPATIENT)
Dept: ORTHOPEDICS | Facility: CLINIC | Age: 59
End: 2024-02-09
Payer: MEDICAID

## 2024-02-09 RX ORDER — AMOXICILLIN AND CLAVULANATE POTASSIUM 875; 125 MG/1; MG/1
1 TABLET, FILM COATED ORAL 2 TIMES DAILY
Qty: 20 TABLET | Refills: 1 | Status: SHIPPED | OUTPATIENT
Start: 2024-02-09

## 2024-02-09 NOTE — TELEPHONE ENCOUNTER
----- Message from Saniya Houston sent at 2/8/2024  4:43 PM CST -----  Type:  Needs Medical Advice    Who Called:  Pt   Pharmacy name and phone #:[id number] - [place] - [address]  [address] [place] [address]  Phone: [phone] Fax: [phone]    Would the patient rather a call back or a response via [facility]? Callback   Best Call Back Number: [phone]  Additional Information: Pt is requesting a callback from this provider office in regards to medications questions

## 2024-02-09 NOTE — TELEPHONE ENCOUNTER
Spoke with Ross, in regards to antibiotics. Patient states he was to receive an antibiotic at yesterday's visit. I advised patient, message will be sent over to Dr. Reid. All questions answered and verbalization expressed.

## 2024-02-20 ENCOUNTER — HOSPITAL ENCOUNTER (OUTPATIENT)
Facility: HOSPITAL | Age: 59
Discharge: HOME | End: 2024-02-20
Attending: ORTHOPAEDIC SURGERY | Admitting: ORTHOPAEDIC SURGERY
Payer: MEDICAID

## 2024-02-20 ENCOUNTER — ANESTHESIA (OUTPATIENT)
Dept: SURGERY | Facility: HOSPITAL | Age: 59
End: 2024-02-20
Payer: MEDICAID

## 2024-02-20 ENCOUNTER — ANESTHESIA EVENT (OUTPATIENT)
Dept: SURGERY | Facility: HOSPITAL | Age: 59
End: 2024-02-20
Payer: MEDICAID

## 2024-02-20 VITALS
WEIGHT: 150 LBS | SYSTOLIC BLOOD PRESSURE: 154 MMHG | TEMPERATURE: 98 F | RESPIRATION RATE: 18 BRPM | HEART RATE: 68 BPM | HEIGHT: 65 IN | DIASTOLIC BLOOD PRESSURE: 69 MMHG | OXYGEN SATURATION: 99 % | BODY MASS INDEX: 24.99 KG/M2

## 2024-02-20 DIAGNOSIS — L03.012 CELLULITIS OF LEFT LITTLE FINGER: Primary | ICD-10-CM

## 2024-02-20 DIAGNOSIS — L02.512 ABSCESS OF FINGER OF LEFT HAND: ICD-10-CM

## 2024-02-20 DIAGNOSIS — L02.512 ABSCESS OF LEFT HAND: ICD-10-CM

## 2024-02-20 LAB — POCT GLUCOSE: 215 MG/DL (ref 70–110)

## 2024-02-20 PROCEDURE — 11422 EXC H-F-NK-SP B9+MARG 1.1-2: CPT | Mod: 51,,, | Performed by: ORTHOPAEDIC SURGERY

## 2024-02-20 PROCEDURE — 37000008 HC ANESTHESIA 1ST 15 MINUTES: Performed by: ORTHOPAEDIC SURGERY

## 2024-02-20 PROCEDURE — 37000009 HC ANESTHESIA EA ADD 15 MINS: Performed by: ORTHOPAEDIC SURGERY

## 2024-02-20 PROCEDURE — 63600175 PHARM REV CODE 636 W HCPCS: Performed by: STUDENT IN AN ORGANIZED HEALTH CARE EDUCATION/TRAINING PROGRAM

## 2024-02-20 PROCEDURE — 87206 SMEAR FLUORESCENT/ACID STAI: CPT | Performed by: ORTHOPAEDIC SURGERY

## 2024-02-20 PROCEDURE — 88304 TISSUE EXAM BY PATHOLOGIST: CPT | Mod: 26,,, | Performed by: PATHOLOGY

## 2024-02-20 PROCEDURE — 12041 INTMD RPR N-HF/GENIT 2.5CM/<: CPT | Mod: F2,,, | Performed by: ORTHOPAEDIC SURGERY

## 2024-02-20 PROCEDURE — 88312 SPECIAL STAINS GROUP 1: CPT | Mod: 26,,, | Performed by: PATHOLOGY

## 2024-02-20 PROCEDURE — D9220A PRA ANESTHESIA: Mod: ,,, | Performed by: STUDENT IN AN ORGANIZED HEALTH CARE EDUCATION/TRAINING PROGRAM

## 2024-02-20 PROCEDURE — 88312 SPECIAL STAINS GROUP 1: CPT | Performed by: PATHOLOGY

## 2024-02-20 PROCEDURE — 63600175 PHARM REV CODE 636 W HCPCS: Performed by: ORTHOPAEDIC SURGERY

## 2024-02-20 PROCEDURE — 87116 MYCOBACTERIA CULTURE: CPT | Performed by: ORTHOPAEDIC SURGERY

## 2024-02-20 PROCEDURE — 36000707: Performed by: ORTHOPAEDIC SURGERY

## 2024-02-20 PROCEDURE — 71000015 HC POSTOP RECOV 1ST HR: Performed by: ORTHOPAEDIC SURGERY

## 2024-02-20 PROCEDURE — 36000706: Performed by: ORTHOPAEDIC SURGERY

## 2024-02-20 PROCEDURE — 88304 TISSUE EXAM BY PATHOLOGIST: CPT | Performed by: PATHOLOGY

## 2024-02-20 PROCEDURE — 25000003 PHARM REV CODE 250: Performed by: STUDENT IN AN ORGANIZED HEALTH CARE EDUCATION/TRAINING PROGRAM

## 2024-02-20 PROCEDURE — 63600175 PHARM REV CODE 636 W HCPCS: Mod: JZ,JG | Performed by: ORTHOPAEDIC SURGERY

## 2024-02-20 RX ORDER — PROCHLORPERAZINE EDISYLATE 5 MG/ML
5 INJECTION INTRAMUSCULAR; INTRAVENOUS EVERY 30 MIN PRN
Status: DISCONTINUED | OUTPATIENT
Start: 2024-02-20 | End: 2024-02-20 | Stop reason: HOSPADM

## 2024-02-20 RX ORDER — CEFAZOLIN SODIUM 2 G/50ML
2 SOLUTION INTRAVENOUS
Status: DISCONTINUED | OUTPATIENT
Start: 2024-02-20 | End: 2024-02-20 | Stop reason: HOSPADM

## 2024-02-20 RX ORDER — MIDAZOLAM HYDROCHLORIDE 1 MG/ML
INJECTION INTRAMUSCULAR; INTRAVENOUS
Status: DISCONTINUED | OUTPATIENT
Start: 2024-02-20 | End: 2024-02-20

## 2024-02-20 RX ORDER — HYDROMORPHONE HYDROCHLORIDE 2 MG/ML
0.2 INJECTION, SOLUTION INTRAMUSCULAR; INTRAVENOUS; SUBCUTANEOUS EVERY 5 MIN PRN
Status: DISCONTINUED | OUTPATIENT
Start: 2024-02-20 | End: 2024-02-20 | Stop reason: HOSPADM

## 2024-02-20 RX ORDER — PROPOFOL 10 MG/ML
VIAL (ML) INTRAVENOUS CONTINUOUS PRN
Status: DISCONTINUED | OUTPATIENT
Start: 2024-02-20 | End: 2024-02-20

## 2024-02-20 RX ORDER — ONDANSETRON 8 MG/1
8 TABLET, ORALLY DISINTEGRATING ORAL EVERY 8 HOURS PRN
Status: DISCONTINUED | OUTPATIENT
Start: 2024-02-20 | End: 2024-02-20 | Stop reason: HOSPADM

## 2024-02-20 RX ORDER — PROPOFOL 10 MG/ML
VIAL (ML) INTRAVENOUS
Status: DISCONTINUED | OUTPATIENT
Start: 2024-02-20 | End: 2024-02-20

## 2024-02-20 RX ORDER — BUPIVACAINE HYDROCHLORIDE 5 MG/ML
INJECTION, SOLUTION EPIDURAL; INTRACAUDAL
Status: DISCONTINUED | OUTPATIENT
Start: 2024-02-20 | End: 2024-02-20 | Stop reason: HOSPADM

## 2024-02-20 RX ORDER — HYDROCODONE BITARTRATE AND ACETAMINOPHEN 5; 325 MG/1; MG/1
1 TABLET ORAL EVERY 4 HOURS PRN
Qty: 20 TABLET | Refills: 0 | Status: SHIPPED | OUTPATIENT
Start: 2024-02-20

## 2024-02-20 RX ORDER — KETOROLAC TROMETHAMINE 30 MG/ML
30 INJECTION, SOLUTION INTRAMUSCULAR; INTRAVENOUS ONCE
Status: DISCONTINUED | OUTPATIENT
Start: 2024-02-20 | End: 2024-02-20 | Stop reason: HOSPADM

## 2024-02-20 RX ORDER — MINOCYCLINE HYDROCHLORIDE 100 MG/1
100 CAPSULE ORAL 2 TIMES DAILY
Qty: 28 CAPSULE | Refills: 1 | Status: SHIPPED | OUTPATIENT
Start: 2024-02-20

## 2024-02-20 RX ORDER — SODIUM CHLORIDE 0.9 % (FLUSH) 0.9 %
10 SYRINGE (ML) INJECTION DAILY PRN
Status: DISCONTINUED | OUTPATIENT
Start: 2024-02-20 | End: 2024-02-20 | Stop reason: HOSPADM

## 2024-02-20 RX ORDER — OXYCODONE HYDROCHLORIDE 5 MG/1
10 TABLET ORAL EVERY 4 HOURS PRN
Status: DISCONTINUED | OUTPATIENT
Start: 2024-02-20 | End: 2024-02-20 | Stop reason: HOSPADM

## 2024-02-20 RX ORDER — LIDOCAINE HYDROCHLORIDE 20 MG/ML
INJECTION INTRAVENOUS
Status: DISCONTINUED | OUTPATIENT
Start: 2024-02-20 | End: 2024-02-20

## 2024-02-20 RX ORDER — ACETAMINOPHEN 325 MG/1
650 TABLET ORAL EVERY 4 HOURS PRN
Status: DISCONTINUED | OUTPATIENT
Start: 2024-02-20 | End: 2024-02-20 | Stop reason: HOSPADM

## 2024-02-20 RX ORDER — OXYCODONE HYDROCHLORIDE 5 MG/1
5 TABLET ORAL
Status: DISCONTINUED | OUTPATIENT
Start: 2024-02-20 | End: 2024-02-20 | Stop reason: HOSPADM

## 2024-02-20 RX ADMIN — SODIUM CHLORIDE, SODIUM LACTATE, POTASSIUM CHLORIDE, AND CALCIUM CHLORIDE: .6; .31; .03; .02 INJECTION, SOLUTION INTRAVENOUS at 12:02

## 2024-02-20 RX ADMIN — CEFAZOLIN SODIUM 2 G: 2 SOLUTION INTRAVENOUS at 12:02

## 2024-02-20 RX ADMIN — MIDAZOLAM HYDROCHLORIDE 1 MG: 1 INJECTION, SOLUTION INTRAMUSCULAR; INTRAVENOUS at 12:02

## 2024-02-20 RX ADMIN — Medication 20 MG: at 12:02

## 2024-02-20 RX ADMIN — PROPOFOL 125 MCG/KG/MIN: 10 INJECTION, EMULSION INTRAVENOUS at 12:02

## 2024-02-20 RX ADMIN — LIDOCAINE HYDROCHLORIDE 60 MG: 20 INJECTION, SOLUTION INTRAVENOUS at 12:02

## 2024-02-20 RX ADMIN — Medication 50 MG: at 12:02

## 2024-02-20 NOTE — OP NOTE
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      Postop diagnosis:  Same.      Operative procedure:  1. Excisional debridement granuloma left middle finger dorsal 2 cm.      2. Local flap wound closure left middle finger dorsal 2 cm.      Surgeon: Franklin.      First assistant:  Pancho.      Anesthesia:  Local MAC.      EBL: Minimal.      Specimen:  Skin and granuloma.      Complications: None.      Operative procedure in detail as follows:    After operative consent was obtained patient brought the operating room placed supine on the operating table.  Anesthesia by IV sedation followed by injection of xylocaine Marcaine combination at the base of left middle finger performing a digital block.  Tourniquet applied left arm left upper extremity prepped and draped out in the normal sterile fashion.  Esmarch used to exsanguinated the limb and the tourniquet inflated 225 mmHg.      At this point AS shaped incision was made with a 15 blade incorporating the granulomatous mass which was centered over the middle phalanx.  It measured about 2 cm.  The entire mass was removed in 1 block specimen and sent for pathologic exam.  It did have evidence of infection which extended down to the extensor tendon but did not penetrate the extensor tendon and did not involve the bone or joint.      All of the abnormal tissue was removed and sent  for exam and cultures including AFB culture.      After excisional debridement hemostasis was achieved with the Bovie the wound thoroughly irrigated with antibiotic saline solution.      At this point undermining of the skin edges was performed and a local flap closure was performed closing the defect entirely with interrupted 5 O nylon horizontal mattress suture.  Sterile dressing applied followed by light wrap tourniquet deflated patient brought to the recovery room stable condition all sponge needle counts reported as correct no complications    Following this injection performed      Significant Surgical Tasks Conducted by the Assistant(s), if Applicable: retraction    Estimated Blood Loss (EBL): * No values recorded between 2/20/2024 12:51 PM and 2/20/2024  1:25 PM *           Implants: * No implants in log *    Specimens:   Specimen (24h ago, onward)      None                    Condition: Good    Disposition: PACU - hemodynamically stable.    Attestation: I was present and scrubbed for the entire procedure.    Discharge Note    OUTCOME: Patient tolerated treatment/procedure well without complication and is now ready for discharge.    DISPOSITION: Home or Self Care    FINAL DIAGNOSIS:  Abscess of finger of left hand    FOLLOWUP: In clinic    DISCHARGE INSTRUCTIONS:    Discharge Procedure Orders   Diet general     Call MD for:  temperature >100.4     Call MD for:  persistent nausea and vomiting     Call MD for:  severe uncontrolled pain     Keep surgical extremity elevated     Remove dressing in 72 hours

## 2024-02-20 NOTE — OPERATIVE NOTE ADDENDUM
Certification of Assistant at Surgery       Surgery Date: 2/20/2024     Participating Surgeons:  Surgeon(s) and Role:     * Abdon Reid Jr., MD - Primary    Procedures:  Procedure(s) (LRB):  DEBRIDEMENT, PHALANX, HAND (Left)    Assistant Surgeon's Certification of Necessity:  I understand that section 1842 (b) (6) (d) of the Social Security Act generally prohibits Medicare Part B reasonable charge payment for the services of assistants at surgery in teaching hospitals when qualified residents are available to furnish such services. I certify that the services for which payment is claimed were medically necessary, and that no qualified resident was available to perform the services. I further understand that these services are subject to post-payment review by the Medicare carrier.      Clover Deleon PA-C    02/20/2024  4:29 PM

## 2024-02-20 NOTE — ANESTHESIA POSTPROCEDURE EVALUATION
Anesthesia Post Evaluation    Patient: Ross Crystal    Procedure(s) Performed: Procedure(s) (LRB):  DEBRIDEMENT, PHALANX, HAND (Left)    Final Anesthesia Type: general      Patient location during evaluation: [facility]  Patient participation: Yes- Able to Participate  Level of consciousness: awake and alert, oriented and awake  Post-procedure vital signs: reviewed and stable  Pain management: adequate  Airway patency: patent  SILVA mitigation strategies: Multimodal analgesia  PONV status at discharge: No PONV  Anesthetic complications: no      Cardiovascular status: blood pressure returned to baseline and hemodynamically stable  Respiratory status: unassisted and spontaneous ventilation  Hydration status: euvolemic  Follow-up not needed.              Vitals Value Taken Time   /69 02/20/24 1415   Temp 36.6 °C (97.9 °F) 02/20/24 1415   Pulse 68 02/20/24 1415   Resp 18 02/20/24 1415   SpO2 99 % 02/20/24 1415         No case tracking events are documented in the log.      Pain/Jesús Score: Jesús Score: 10 (2/20/2024  2:15 PM)

## 2024-02-20 NOTE — ANESTHESIA PREPROCEDURE EVALUATION
02/20/2024  [facility]  Anesthesia Pre-Operative Evaluation         Patient Name: Ross Crystal  YOB: 1965  MRN: [id number]    SUBJECTIVE:     Pre-operative evaluation for Procedure(s) (LRB):  DEBRIDEMENT, PHALANX, HAND (Left)     02/20/2024    Ross Crystal is a 58 y.o. male w/ a significant PMHx of DM, HLD, chronic pain, HTN, IDDM, smoker. Patient now presents for the above procedure(s).    TTE:   none documented.     Patient Active Problem List   Diagnosis    Foot pain    Diabetes mellitus type 2, uncontrolled    Mixed hyperlipidemia    Neuropathy    Neuropathic pain    Vertigo    Colon polyps    Calcaneal spur of left foot    Hydrocele, acquired    Orchalgia    Epididymitis, right    Benign prostatic hyperplasia without urinary obstruction    Chronic pain syndrome    Essential hypertension    Long term current use of insulin    Low back pain    Mild recurrent major depression    Opioid dependence    Pure hypercholesterolemia    Tobacco abuse    Type 2 diabetes mellitus without complication    Personal history of colonic polyps    Preoperative examination    Cellulitis of left upper extremity    Leukocytosis    Cellulitis of left little finger    Hand arthritis    Pulp abscess of finger of left hand       Review of patient's allergies indicates:   Allergen Reactions    Sulfamethoxazole      Other Reaction(s): hives rash    Trimethoprim      Other Reaction(s): hives rash    Bactrim [sulfamethoxazole-trimethoprim] Rash       Current Inpatient Medications:      No current facility-administered medications on file prior to encounter.     Current Outpatient Medications on File Prior to Encounter   Medication Sig Dispense Refill    aspirin (ECOTRIN) 81 MG EC tablet Take 81 mg by mouth once daily.      atorvastatin (LIPITOR) 20 MG tablet Take 20 mg by mouth.      BD ULTRA-FINE JENNY  "PEN NEEDLE 32 gauge x 5/32" Ndle 4 (four) times daily.      DEXCOM G6  Misc       DEXCOM G6 SENSOR Sadaf SMARTSI Each Topical Every 10 Days      DEXCOM G6 TRANSMITTER Sadaf       fish oil-omega-3 fatty acids 300-1,000 mg capsule Take 1 capsule by mouth 2 (two) times a day.      hydrocodone-acetaminophen 10-325mg (NORCO)  mg Tab Take 1 tablet by mouth 2 (two) times a day.      insulin glargine 100 units/mL SubQ pen Inject 45 Units into the skin.      insulin syringe-needle U-100 1 mL 31 gauge x 5/16 Syrg       LEVEMIR FLEXTOUCH U-100 INSULN 100 unit/mL (3 mL) InPn pen Inject 35 Units into the skin 2 (two) times daily.      meclizine (ANTIVERT) 50 MG tablet Take 50 mg by mouth 2 (two) times daily.      meloxicam (MOBIC) 15 MG tablet Take 15 mg by mouth.      naproxen (NAPROSYN) 500 MG tablet Take 1 tablet (500 mg total) by mouth 2 (two) times daily with meals. 20 tablet 0    nicotine (NICODERM CQ) 21 mg/24 hr Place 1 patch onto the skin once daily. 30 patch 3    NOVOLOG FLEXPEN U-100 INSULIN 100 unit/mL (3 mL) InPn pen SMARTSI-12 Unit(s) SUB-Q As Directed      pravastatin (PRAVACHOL) 40 MG tablet Take 40 mg by mouth every evening.      tamsulosin (FLOMAX) 0.4 mg Cap Take 0.4 mg by mouth once daily.         Past Surgical History:   Procedure Laterality Date    CARPAL TUNNEL RELEASE  25 YEARS AGO    COLONOSCOPY      COLONOSCOPY N/A 2016    Procedure: COLONOSCOPY;  Surgeon: Luis Bogran-Reyes, MD;  Location: [facility];  Service: Endoscopy;  Laterality: N/A;    DEBRIDEMENT, PHALANX, HAND Left 2022    Procedure: DEBRIDEMENT, PHALANX, HAND;  Surgeon: Abdon Reid Jr., MD;  Location: [facility] OR;  Service: Orthopedics;  Laterality: Left;    ESOPHAGOGASTRODUODENOSCOPY      HERNIA REPAIR      HERNIA REPAIR         OBJECTIVE:     Vital Signs Range (Last 24H):         Significant Labs:  Lab Results   Component Value Date    WBC 12.93 (H) 2024    HGB 16.5 2024    HCT 50.3 2024    PLT " 301 01/09/2024    CHOL 167 04/04/2023    TRIG 63 04/04/2023    HDL 49 04/04/2023    ALT 31 01/09/2024    AST 26 01/09/2024     01/09/2024    K 4.3 01/09/2024     01/09/2024    CREATININE 0.79 01/09/2024    BUN 21 (H) 01/09/2024    CO2 28 01/09/2024    TSH 2.770 09/26/2022    PSA 0.94 10/19/2015    INR 1.0 02/22/2018    HGBA1C 8.0 (H) 01/18/2024       Diagnostic Studies: No relevant studies.    EKG:   Results for orders placed or performed during the hospital encounter of 02/22/18   EKG 12-lead    Collection Time: 02/22/18  1:28 PM    Narrative    Test Reason : R07.9  Vent. Rate : 080 BPM     Atrial Rate : 080 BPM     P-R Int : 142 ms          QRS Dur : 094 ms      QT Int : 368 ms       P-R-T Axes : 065 062 038 degrees     QTc Int : 424 ms    Normal sinus rhythm  Normal ECG  When compared with ECG of 17-JUL-2016 17:39,  No significant change was found  Confirmed by Jaret Kingsley MD ([id number]) on 2/23/2018 9:14:12 AM    Referred By: VÍCTOR SILVERIO           Confirmed By:Jaret Kingsley MD       ASSESSMENT/PLAN:           Pre-op Assessment    I have reviewed the Patient Summary Reports.     I have reviewed the Nursing Notes. I have reviewed the NPO Status.   I have reviewed the Medications.     Review of Systems  Anesthesia Hx:    Pt reports that he doesn't like how anesthesia makes him feel.   History of prior surgery of interest to airway management or planning:            Denies Personal Hx of Anesthesia complications.                    Social:  Smoker, No Alcohol Use 2 PPD smoker      Hematology/Oncology:  Hematology Normal   Oncology Normal                                   EENT/Dental:  EENT/Dental Normal           Cardiovascular:  Exercise tolerance: good   Hypertension           hyperlipidemia                             Pulmonary:  Pulmonary Normal   Denies COPD.  Denies Asthma.     Denies Sleep Apnea.                Renal/:    BPH              Hepatic/GI:  Hepatic/GI Normal     Denies GERD.              Neurological:  Neurology Normal Denies TIA.  Denies CVA.                                    Endocrine:  Diabetes, poorly controlled, using insulin Denies Hypothyroidism.  Denies Hyperthyroidism.         Psych:    depression                Physical Exam  General: Alert, Oriented and Cooperative    Airway:  Mallampati: III / II  Mouth Opening: Normal  TM Distance: Normal  Tongue: Normal  Neck ROM: Normal ROM    Dental:  Edentulous, Dentures        Anesthesia Plan  Type of Anesthesia, risks & benefits discussed:    Anesthesia Type: Gen Natural Airway  Intra-op Monitoring Plan: Standard ASA Monitors  Post Op Pain Control Plan: multimodal analgesia  Induction:  IV  Informed Consent: Informed consent signed with the Patient and all parties understand the risks and agree with anesthesia plan.  All questions answered.   ASA Score: 3  Day of Surgery Review of History & Physical: H&P Update referred to the surgeon/provider.    Ready For Surgery From Anesthesia Perspective.     .

## 2024-02-20 NOTE — TRANSFER OF CARE
"Anesthesia Transfer of Care Note    Patient: Ross Crystal    Procedure(s) Performed: Procedure(s) (LRB):  DEBRIDEMENT, PHALANX, HAND (Left)    Patient location: [facility]    Anesthesia Type: general    Transport from OR: Transported from OR on 2-3 L/min O2 by NC with adequate spontaneous ventilation    Post pain: adequate analgesia    Post assessment: no apparent anesthetic complications    Post vital signs: stable    Level of consciousness: alert, awake and oriented    Nausea/Vomiting: no nausea/vomiting    Complications: none    Transfer of care protocol was followed      Last vitals: Visit Vitals  /70 (BP Location: Right arm, Patient Position: Lying)   Pulse 69   Temp 36.6 °C (97.8 °F) (Tympanic)   Resp 16   Ht 5' 5" (1.651 m)   Wt 68 kg (150 lb)   SpO2 97%   BMI 24.96 kg/m²     "

## 2024-02-29 LAB
FINAL PATHOLOGIC DIAGNOSIS: NORMAL
GROSS: NORMAL
Lab: NORMAL
MICROSCOPIC EXAM: NORMAL

## 2024-03-05 ENCOUNTER — OFFICE VISIT (OUTPATIENT)
Dept: ORTHOPEDICS | Facility: CLINIC | Age: 59
End: 2024-03-05
Payer: MEDICAID

## 2024-03-05 VITALS — HEIGHT: 65 IN | WEIGHT: 160 LBS | BODY MASS INDEX: 26.66 KG/M2

## 2024-03-05 DIAGNOSIS — L02.512 PULP ABSCESS OF FINGER OF LEFT HAND: Primary | ICD-10-CM

## 2024-03-05 PROCEDURE — 99999 PR PBB SHADOW E&M-EST. PATIENT-LVL III: CPT | Mod: PBBFAC,,, | Performed by: PHYSICIAN ASSISTANT

## 2024-03-05 PROCEDURE — 3052F HG A1C>EQUAL 8.0%<EQUAL 9.0%: CPT | Mod: CPTII,,, | Performed by: PHYSICIAN ASSISTANT

## 2024-03-05 PROCEDURE — 99024 POSTOP FOLLOW-UP VISIT: CPT | Mod: ,,, | Performed by: PHYSICIAN ASSISTANT

## 2024-03-05 PROCEDURE — 1159F MED LIST DOCD IN RCRD: CPT | Mod: CPTII,,, | Performed by: PHYSICIAN ASSISTANT

## 2024-03-05 PROCEDURE — 1160F RVW MEDS BY RX/DR IN RCRD: CPT | Mod: CPTII,,, | Performed by: PHYSICIAN ASSISTANT

## 2024-03-05 PROCEDURE — 99213 OFFICE O/P EST LOW 20 MIN: CPT | Mod: PBBFAC,PN | Performed by: PHYSICIAN ASSISTANT

## 2024-03-05 RX ORDER — MINOCYCLINE HYDROCHLORIDE 100 MG/1
100 CAPSULE ORAL EVERY 12 HOURS
Qty: 14 CAPSULE | Refills: 0 | Status: SHIPPED | OUTPATIENT
Start: 2024-03-05 | End: 2024-03-12

## 2024-03-05 NOTE — PROGRESS NOTES
"  Subjective:     Patient ID: Ross Crystal is a 58 y.o. male.    Chief Complaint: Postop Follow Up    HPI:   Ross Crystal  returns for a postop visit approximately 2 weeks following:    Surgery:  Left middle finger dorsal excisional debridement of granuloma + local flap wound coverage to left middle finger dorsum  Date of Surgery:  2-  Surgeon: Dr. Reid    Overall, the patient is doing well with no complaints of fever, chills, or drainage to surgical incision. The patient reports that pain has been managed with medication and he continues the prescribed antibiotics minocycline. Post-operative complaints include: none    Intraoperative Culture Results:   Final Pathologic Diagnosis 1. Skin, 3rd finger left hand, excision:  - PSEUDOEPITHELIOMATOUS EPIDERMAL HYPERPLASIA, WITH CYSTIC INVAGINATION AND SINUS TRACT FORMATION.  - THERE IS INTRALUMINAL SUPPURATIVE INFLAMMATION, FORMING AN ABSCESS.  - THE UNDERLYING AND ADJACENT DERMIS SHOWS LYMPHOCYTIC AND GRANULOMATOUS INFLAMMATION WITH KERATIN FRAGMENTS.    COMMENT: These histologic features are concerning for an infectious etiology, despite the negative special stains.  Correlation with tissue culture is advised.  This histology can also be seen in the context of a ruptured keratoacathoma.     AFB Culture & Smear Culture in progress P   AFB CULTURE STAIN No acid fast bacilli seen.       Past Medical History:   Diagnosis Date    Benign paroxysmal vertigo, bilateral     Diabetes mellitus     Diabetes mellitus type I     Hyperlipemia     Renal disorder     Urinary tract infection        Current Outpatient Medications:     amoxicillin-clavulanate 875-125mg (AUGMENTIN) 875-125 mg per tablet, Take 1 tablet by mouth 2 (two) times a day., Disp: 20 tablet, Rfl: 1    aspirin (ECOTRIN) 81 MG EC tablet, Take 81 mg by mouth once daily., Disp: , Rfl:     atorvastatin (LIPITOR) 20 MG tablet, Take 20 mg by mouth., Disp: , Rfl:     BD ULTRA-FINE JENNY PEN NEEDLE 32 gauge x 5/32" " Ndle, 4 (four) times daily., Disp: , Rfl:     DEXCOM G6  Misc, , Disp: , Rfl:     DEXCOM G6 SENSOR Sadaf, SMARTSI Each Topical Every 10 Days, Disp: , Rfl:     DEXCOM G6 TRANSMITTER Sadaf, , Disp: , Rfl:     fish oil-omega-3 fatty acids 300-1,000 mg capsule, Take 1 capsule by mouth 2 (two) times a day., Disp: , Rfl:     HYDROcodone-acetaminophen (NORCO) 5-325 mg per tablet, Take 1 tablet by mouth every 4 (four) hours as needed for Pain., Disp: 20 tablet, Rfl: 0    hydrocodone-acetaminophen 10-325mg (NORCO)  mg Tab, Take 1 tablet by mouth 2 (two) times a day., Disp: , Rfl:     insulin glargine 100 units/mL SubQ pen, Inject 45 Units into the skin., Disp: , Rfl:     insulin syringe-needle U-100 1 mL 31 gauge x 5/16 Syrg, , Disp: , Rfl:     LEVEMIR FLEXTOUCH U-100 INSULN 100 unit/mL (3 mL) InPn pen, Inject 35 Units into the skin 2 (two) times daily., Disp: , Rfl:     meclizine (ANTIVERT) 50 MG tablet, Take 50 mg by mouth 2 (two) times daily., Disp: , Rfl:     meloxicam (MOBIC) 15 MG tablet, Take 15 mg by mouth., Disp: , Rfl:     minocycline (MINOCIN,DYNACIN) 100 MG capsule, Take 1 capsule (100 mg total) by mouth 2 (two) times a day., Disp: 28 capsule, Rfl: 1    naproxen (NAPROSYN) 500 MG tablet, Take 1 tablet (500 mg total) by mouth 2 (two) times daily with meals., Disp: 20 tablet, Rfl: 0    nicotine (NICODERM CQ) 21 mg/24 hr, Place 1 patch onto the skin once daily., Disp: 30 patch, Rfl: 3    NOVOLOG FLEXPEN U-100 INSULIN 100 unit/mL (3 mL) InPn pen, SMARTSI-12 Unit(s) SUB-Q As Directed, Disp: , Rfl:     pravastatin (PRAVACHOL) 40 MG tablet, Take 40 mg by mouth every evening., Disp: , Rfl:     tamsulosin (FLOMAX) 0.4 mg Cap, Take 0.4 mg by mouth once daily., Disp: , Rfl:   Review of patient's allergies indicates:   Allergen Reactions    Sulfamethoxazole      Other Reaction(s): hives rash    Trimethoprim      Other Reaction(s): hives rash    Bactrim [sulfamethoxazole-trimethoprim] Rash       Review of  Systems   Constitutional:  Negative for chills and fever.   Respiratory:  Negative for shortness of breath.    Cardiovascular:  Negative for chest pain and leg swelling.   Gastrointestinal:  Negative for nausea and vomiting.   Genitourinary:  Negative for dysuria.   Musculoskeletal:  Negative for joint pain. Negative for falls.   Skin:  Negative for rash.   Neurological:  Negative for dizziness and Negative for sensory change.    Objective:   Orthopedic Physical Exam  There were no vitals taken for this visit.  GEN: Well developed, well nourished male. AAOX3. No acute distress.   Breathing unlabored.  Mood and affect appropriate.     LEFT hand:    Inspection:   Surgical wound margins are well approximated and healing nicely.   No redness or warmth.  Mid-incisional region of serous output (scant)  mild swelling.   ROM: wrist and fingers intact without pain or difficulty.  Strength:  strength not tested today.    NV: Sensation intact to finger pads. Pulses present.    Intraoperative Culture Results:   Final Pathologic Diagnosis 1. Skin, 3rd finger left hand, excision:  - PSEUDOEPITHELIOMATOUS EPIDERMAL HYPERPLASIA, WITH CYSTIC INVAGINATION AND SINUS TRACT FORMATION.  - THERE IS INTRALUMINAL SUPPURATIVE INFLAMMATION, FORMING AN ABSCESS.  - THE UNDERLYING AND ADJACENT DERMIS SHOWS LYMPHOCYTIC AND GRANULOMATOUS INFLAMMATION WITH KERATIN FRAGMENTS.    COMMENT: These histologic features are concerning for an infectious etiology, despite the negative special stains.  Correlation with tissue culture is advised.  This histology can also be seen in the context of a ruptured keratoacathoma.     AFB Culture & Smear Culture in progress P   AFB CULTURE STAIN No acid fast bacilli seen.       Assessment:       ICD-10-CM ICD-9-CM    1. Pulp abscess of finger of left hand  L02.512 681.00           Plan:   2wks s/p     Left middle finger dorsal excisional debridement of granuloma + local flap wound coverage to left middle finger  dorsum  Finger abscess    Wound Care: Sutures removed at the bedside. Regular wound care explained with soap and water.  Abx: Refill of Rx Minocycline prescribed for extended coverage of digit abscess  Activity:  Encouraged on digit range of motion  Pain Control: rest, ice, OTC analgesics    Follow Up: 2-3 weeks for wound check following completion of 2nd round antibiotics

## 2024-03-21 ENCOUNTER — OFFICE VISIT (OUTPATIENT)
Dept: ORTHOPEDICS | Facility: CLINIC | Age: 59
End: 2024-03-21
Payer: MEDICAID

## 2024-03-21 VITALS — BODY MASS INDEX: 26.63 KG/M2 | HEIGHT: 65 IN

## 2024-03-21 DIAGNOSIS — L02.512 ABSCESS OF FINGER OF LEFT HAND: Primary | ICD-10-CM

## 2024-03-21 PROCEDURE — 3052F HG A1C>EQUAL 8.0%<EQUAL 9.0%: CPT | Mod: CPTII,,, | Performed by: ORTHOPAEDIC SURGERY

## 2024-03-21 PROCEDURE — 20526 THER INJECTION CARP TUNNEL: CPT | Mod: PBBFAC,PN | Performed by: ORTHOPAEDIC SURGERY

## 2024-03-21 PROCEDURE — 99999 PR PBB SHADOW E&M-EST. PATIENT-LVL III: CPT | Mod: PBBFAC,,, | Performed by: ORTHOPAEDIC SURGERY

## 2024-03-21 PROCEDURE — 99213 OFFICE O/P EST LOW 20 MIN: CPT | Mod: PBBFAC,PN,25 | Performed by: ORTHOPAEDIC SURGERY

## 2024-03-21 PROCEDURE — 99999PBSHW PR PBB SHADOW TECHNICAL ONLY FILED TO HB: Mod: PBBFAC,,,

## 2024-03-21 PROCEDURE — 1159F MED LIST DOCD IN RCRD: CPT | Mod: CPTII,,, | Performed by: ORTHOPAEDIC SURGERY

## 2024-03-21 PROCEDURE — 99024 POSTOP FOLLOW-UP VISIT: CPT | Mod: ,,, | Performed by: ORTHOPAEDIC SURGERY

## 2024-03-21 PROCEDURE — 20526 THER INJECTION CARP TUNNEL: CPT | Mod: S$PBB,79,LT, | Performed by: ORTHOPAEDIC SURGERY

## 2024-03-21 RX ORDER — TRIAMCINOLONE ACETONIDE 40 MG/ML
20 INJECTION, SUSPENSION INTRA-ARTICULAR; INTRAMUSCULAR
Status: COMPLETED | OUTPATIENT
Start: 2024-03-21 | End: 2024-03-21

## 2024-03-21 RX ADMIN — TRIAMCINOLONE ACETONIDE 20 MG: 40 INJECTION, SUSPENSION INTRA-ARTICULAR; INTRAMUSCULAR at 10:03

## 2024-03-21 NOTE — PROGRESS NOTES
"Subjective:      Patient ID: Ross Crystal is a 58 y.o. male.  Chief Complaint: Post-op Evaluation (Left index finger) and Wrist Pain (Bilateral wrist pain)      HPI  Ross Crystal is a  58 y.o. male presenting today for post op visit.  He is s/p excision abscess and chronic ulcer of the left middle finger  His cultures all remain negative for AFB   He is doing well on unrelated matter he is having some numbness tingling in the left hand usually worse in the morning.     Review of patient's allergies indicates:   Allergen Reactions    Sulfamethoxazole      Other Reaction(s): hives rash    Trimethoprim      Other Reaction(s): hives rash    Bactrim [sulfamethoxazole-trimethoprim] Rash         Current Outpatient Medications   Medication Sig Dispense Refill    amoxicillin-clavulanate 875-125mg (AUGMENTIN) 875-125 mg per tablet Take 1 tablet by mouth 2 (two) times a day. 20 tablet 1    aspirin (ECOTRIN) 81 MG EC tablet Take 81 mg by mouth once daily.      atorvastatin (LIPITOR) 20 MG tablet Take 20 mg by mouth.      BD ULTRA-FINE JENNY PEN NEEDLE 32 gauge x 5/32" Ndle 4 (four) times daily.      DEXCOM G6  Misc       DEXCOM G6 SENSOR Sadaf SMARTSI Each Topical Every 10 Days      DEXCOM G6 TRANSMITTER Sadaf       fish oil-omega-3 fatty acids 300-1,000 mg capsule Take 1 capsule by mouth 2 (two) times a day.      HYDROcodone-acetaminophen (NORCO) 5-325 mg per tablet Take 1 tablet by mouth every 4 (four) hours as needed for Pain. 20 tablet 0    hydrocodone-acetaminophen 10-325mg (NORCO)  mg Tab Take 1 tablet by mouth 2 (two) times a day.      insulin glargine 100 units/mL SubQ pen Inject 45 Units into the skin.      insulin syringe-needle U-100 1 mL 31 gauge x 5/16 Syrg       LEVEMIR FLEXTOUCH U-100 INSULN 100 unit/mL (3 mL) InPn pen Inject 35 Units into the skin 2 (two) times daily.      meclizine (ANTIVERT) 50 MG tablet Take 50 mg by mouth 2 (two) times daily.      meloxicam (MOBIC) 15 MG tablet Take 15 mg by " "mouth.      minocycline (MINOCIN,DYNACIN) 100 MG capsule Take 1 capsule (100 mg total) by mouth 2 (two) times a day. 28 capsule 1    naproxen (NAPROSYN) 500 MG tablet Take 1 tablet (500 mg total) by mouth 2 (two) times daily with meals. 20 tablet 0    nicotine (NICODERM CQ) 21 mg/24 hr Place 1 patch onto the skin once daily. 30 patch 3    NOVOLOG FLEXPEN U-100 INSULIN 100 unit/mL (3 mL) InPn pen SMARTSI-12 Unit(s) SUB-Q As Directed      pravastatin (PRAVACHOL) 40 MG tablet Take 40 mg by mouth every evening.      tamsulosin (FLOMAX) 0.4 mg Cap Take 0.4 mg by mouth once daily.       No current facility-administered medications for this visit.       Past Medical History:   Diagnosis Date    Benign paroxysmal vertigo, bilateral     Diabetes mellitus     Diabetes mellitus type I     Hyperlipemia     Renal disorder     Urinary tract infection        Past Surgical History:   Procedure Laterality Date    CARPAL TUNNEL RELEASE  25 YEARS AGO    COLONOSCOPY      COLONOSCOPY N/A 2016    Procedure: COLONOSCOPY;  Surgeon: Luis Bogran-Reyes, MD;  Location: [facility];  Service: Endoscopy;  Laterality: N/A;    DEBRIDEMENT, PHALANX, HAND Left 2022    Procedure: DEBRIDEMENT, PHALANX, HAND;  Surgeon: Abdon Reid Jr., MD;  Location: [facility];  Service: Orthopedics;  Laterality: Left;    DEBRIDEMENT, PHALANX, HAND Left 2024    Procedure: DEBRIDEMENT, PHALANX, HAND;  Surgeon: Abdon Reid Jr., MD;  Location: [facility];  Service: Orthopedics;  Laterality: Left;    ESOPHAGOGASTRODUODENOSCOPY      HERNIA REPAIR      HERNIA REPAIR         OBJECTIVE:   PHYSICAL EXAM:  Height: 5' 5" (165.1 cm)    Vitals:    24 0940   Height: 5' 5" (1.651 m)   PainSc: 10-Worst pain ever   PainLoc: Wrist     Ortho/SPM Exam  Examination left hand the middle finger looks good healing well swan small areas still has an eschar no drainage or tenderness   Range of motion fingers full  Tinel sign mildly positive at the " wrist    RADIOGRAPHS:  None  Comments: I have personally reviewed the imaging and I agree with the above radiologist's report.    ASSESSMENT/PLAN:     IMPRESSION:  1. Status post excision ulcer left middle finger.      2. Left carpal tunnel syndrome    PLAN:  Continue local wound care for the finger   I recommended injection for the wrist   After pause for time-out identified the left carpal tunnel injected with Kenalog 20 mg 0.5 cc xylocaine sterile technique  Tolerated the procedure well without complication       FOLLOW UP:  4-6 weeks    Disclaimer: This note has been generated using voice-recognition software. There may be typographical errors that have been missed during proof-reading.

## 2024-04-08 ENCOUNTER — TELEPHONE (OUTPATIENT)
Dept: HEMATOLOGY/ONCOLOGY | Facility: CLINIC | Age: 59
End: 2024-04-08
Payer: MEDICAID

## 2024-04-08 NOTE — TELEPHONE ENCOUNTER
Called pt to review potassium 6.1 mmol/L, states he has been off and on lightheaded the last two days, denies chest pain or sob. Advised to go to ED for immediate evaluation of elevated potassium. Called report to Jamil QUIROZ in [facility] ED.

## 2024-04-09 LAB
ACID FAST MOD KINY STN SPEC: NORMAL
MYCOBACTERIUM SPEC QL CULT: NORMAL

## 2024-04-16 ENCOUNTER — TELEPHONE (OUTPATIENT)
Dept: HEMATOLOGY/ONCOLOGY | Facility: CLINIC | Age: 59
End: 2024-04-16
Payer: MEDICAID

## 2024-04-17 ENCOUNTER — OFFICE VISIT (OUTPATIENT)
Dept: HEMATOLOGY/ONCOLOGY | Facility: CLINIC | Age: 59
End: 2024-04-17
Payer: MEDICAID

## 2024-04-17 VITALS
OXYGEN SATURATION: 92 % | RESPIRATION RATE: 18 BRPM | BODY MASS INDEX: 27.7 KG/M2 | HEIGHT: 63 IN | HEART RATE: 67 BPM | DIASTOLIC BLOOD PRESSURE: 78 MMHG | WEIGHT: 156.31 LBS | TEMPERATURE: 98 F | SYSTOLIC BLOOD PRESSURE: 123 MMHG

## 2024-04-17 DIAGNOSIS — E11.9 TYPE 2 DIABETES MELLITUS WITHOUT COMPLICATION, WITH LONG-TERM CURRENT USE OF INSULIN: ICD-10-CM

## 2024-04-17 DIAGNOSIS — D72.829 LEUKOCYTOSIS, UNSPECIFIED TYPE: Primary | ICD-10-CM

## 2024-04-17 DIAGNOSIS — Z79.4 TYPE 2 DIABETES MELLITUS WITHOUT COMPLICATION, WITH LONG-TERM CURRENT USE OF INSULIN: ICD-10-CM

## 2024-04-17 DIAGNOSIS — Z72.0 TOBACCO ABUSE: ICD-10-CM

## 2024-04-17 DIAGNOSIS — E78.2 MIXED HYPERLIPIDEMIA: ICD-10-CM

## 2024-04-17 PROCEDURE — 99215 OFFICE O/P EST HI 40 MIN: CPT | Mod: PBBFAC,PN | Performed by: NURSE PRACTITIONER

## 2024-04-17 PROCEDURE — 3052F HG A1C>EQUAL 8.0%<EQUAL 9.0%: CPT | Mod: CPTII,,, | Performed by: NURSE PRACTITIONER

## 2024-04-17 PROCEDURE — 99214 OFFICE O/P EST MOD 30 MIN: CPT | Mod: S$PBB,,, | Performed by: NURSE PRACTITIONER

## 2024-04-17 PROCEDURE — 1159F MED LIST DOCD IN RCRD: CPT | Mod: CPTII,,, | Performed by: NURSE PRACTITIONER

## 2024-04-17 PROCEDURE — 3074F SYST BP LT 130 MM HG: CPT | Mod: CPTII,,, | Performed by: NURSE PRACTITIONER

## 2024-04-17 PROCEDURE — 99999 PR PBB SHADOW E&M-EST. PATIENT-LVL V: CPT | Mod: PBBFAC,,, | Performed by: NURSE PRACTITIONER

## 2024-04-17 PROCEDURE — 3008F BODY MASS INDEX DOCD: CPT | Mod: CPTII,,, | Performed by: NURSE PRACTITIONER

## 2024-04-17 PROCEDURE — 3078F DIAST BP <80 MM HG: CPT | Mod: CPTII,,, | Performed by: NURSE PRACTITIONER

## 2024-04-17 PROCEDURE — 1160F RVW MEDS BY RX/DR IN RCRD: CPT | Mod: CPTII,,, | Performed by: NURSE PRACTITIONER

## 2024-04-17 RX ORDER — ATORVASTATIN CALCIUM 80 MG/1
80 TABLET, FILM COATED ORAL
COMMUNITY

## 2024-04-17 RX ORDER — GLUCAGON 3 MG/1
3 POWDER NASAL
COMMUNITY
Start: 2023-12-13

## 2024-04-17 RX ORDER — FLASH GLUCOSE SCANNING READER
EACH MISCELLANEOUS
COMMUNITY
Start: 2023-10-16

## 2024-04-17 RX ORDER — CALCIUM CITRATE/VITAMIN D3 200MG-6.25
1 TABLET ORAL 4 TIMES DAILY
COMMUNITY

## 2024-04-17 RX ORDER — FLASH GLUCOSE SENSOR
1 KIT MISCELLANEOUS
COMMUNITY
Start: 2023-12-13

## 2024-04-17 RX ORDER — LANCETS 33 GAUGE
EACH MISCELLANEOUS
COMMUNITY
Start: 2023-11-15

## 2024-04-17 RX ORDER — FLASH GLUCOSE SENSOR
KIT MISCELLANEOUS
COMMUNITY

## 2024-04-17 NOTE — PROGRESS NOTES
Patient ID: Ross Crystal is a 58 y.o. male.    Chief Complaint: Leukocytosis          History     HPI    Ross Crystal is a 58yr old male, new to [facility] and this provider, referred for elevated wbc and anc.    Per review of labs, pt with intermittent elevations in wbc since .    Pt with T1DM, mixed HLD, chronic pain syndrome, recurrent depression.    Pt reports he has had frequent skin infections and can attribute some time frames when wbc elevated was related to this. Late Dec 22/2023 pt states in hospital several days related to fish hook injury and infection with noted wbc elevations.     Denies HA, dizziness, night sweats, fevers, weight loss, early satiety or loss of appetite, chest pain, abdominal pain, n/v/d, hematemesis, melena, hematuria, easy bruising. He does have intermittent clifford, chronic back pain. Weight stable, pt states he is in the 150s usually 152-155#.  Reports his diabetes is currently doing better, dexcom reading in clinic at present 126 g/dL.      Family history includes father with diagnosis throat cancer and  of complications related to this at age 62 yrs of age. No other cancer, blood disorder history.     Works full time as a .     INTERVAL  - pt here for leukocytosis follow up 24  - 24 potassium elevation 6.1 mmol/L, ED repeat 3.9 mmol/L   - 3/2024 left middle finger abscess, treated with antibiotics, healed and also had kenalog injection for left carpal tunnel syndrome  - 24 seen by pcp Dr Valverde for groin yeast, waiting for cream to be filled   - Denies HA, dizziness, night sweats, fevers, weight loss, early satiety or loss of appetite, lymph node enlargement, chest pain, abdominal pain, n/v/d, hematemesis, melena, hematuria, easy bruising.   - continues with around the clock blood sugar monitoring, he reports avg of 170s  and A1c 8.3% (24), blood sugar up to 240s last few nights around 2am  - working on smoking cessation, did not tolerate  "chantix and had "gun thoughts, stopped that medicine"; states he had done well with patches one time in hosp and is willing today to discuss cessation with smoking cessation program    Past medical, surgical, and medication history, past family history reviewed and uptdated with patient today as noted.    Past Medical History:   Diagnosis Date    Benign paroxysmal vertigo, bilateral     Diabetes mellitus     Diabetes mellitus type I     Hyperlipemia     Renal disorder     Urinary tract infection        Past Surgical History:   Procedure Laterality Date    CARPAL TUNNEL RELEASE  25 YEARS AGO    COLONOSCOPY      COLONOSCOPY N/A 1/25/2016    Procedure: COLONOSCOPY;  Surgeon: Luis Bogran-Reyes, MD;  Location: [facility];  Service: Endoscopy;  Laterality: N/A;    DEBRIDEMENT, PHALANX, HAND Left 12/29/2022    Procedure: DEBRIDEMENT, PHALANX, HAND;  Surgeon: Abdon Reid Jr., MD;  Location: [facility];  Service: Orthopedics;  Laterality: Left;    DEBRIDEMENT, PHALANX, HAND Left 2/20/2024    Procedure: DEBRIDEMENT, PHALANX, HAND;  Surgeon: Abdon Reid Jr., MD;  Location: [facility];  Service: Orthopedics;  Laterality: Left;    ESOPHAGOGASTRODUODENOSCOPY      HERNIA REPAIR      HERNIA REPAIR         Oncology History:  Oncology History    No history exists.        Review of Systems:  Review of Systems   Constitutional:  Negative for chills, fatigue, fever and unexpected weight change.        Neg night sweats.   HENT:  Negative for nosebleeds.    Eyes:  Negative for visual disturbance.   Respiratory:  Positive for shortness of breath (ocassional when running with grandkids).    Cardiovascular:  Negative for chest pain and leg swelling.   Gastrointestinal:  Negative for abdominal pain, blood in stool, constipation, diarrhea, nausea and vomiting.   Genitourinary:  Negative for hematuria.   Musculoskeletal:  Positive for back pain (chronic, sees pain management).   Neurological:  Negative for dizziness, weakness and headaches. " "  Hematological:  Negative for adenopathy. Does not bruise/bleed easily.   Psychiatric/Behavioral:          Depression history, recurrent, reports doing well right now          Physical Exam   Vitals:  /78 (BP Location: Left arm, Patient Position: Sitting, BP Method: Medium (Automatic))   Pulse 67   Temp 97.8 °F (36.6 °C) (Oral)   Resp 18   Ht 5' 3" (1.6 m)   Wt 70.9 kg (156 lb 4.9 oz)   SpO2 (!) 92%   BMI 27.69 kg/m²     Labs:  Lab Results   Component Value Date    WBC 9.91 04/05/2024    HGB 16.1 04/05/2024    HCT 49.7 04/05/2024    MCV 86 04/05/2024     04/05/2024              Physical Exam:  Physical Exam  Vitals and nursing note reviewed.   Constitutional:       General: He is not in acute distress.     Appearance: He is normal weight. He is not ill-appearing, toxic-appearing or diaphoretic.   HENT:      Head: Normocephalic and atraumatic.      Mouth/Throat:      Mouth: Mucous membranes are moist.      Pharynx: Oropharynx is clear.   Eyes:      General: No scleral icterus.     Conjunctiva/sclera: Conjunctivae normal.   Cardiovascular:      Rate and Rhythm: Normal rate and regular rhythm.      Pulses: Normal pulses.      Heart sounds: Normal heart sounds. No murmur heard.  Pulmonary:      Effort: Pulmonary effort is normal. No respiratory distress.      Breath sounds: Normal breath sounds.   Abdominal:      General: Bowel sounds are normal. There is no distension.      Palpations: Abdomen is soft. There is no mass.      Tenderness: There is no abdominal tenderness. There is no guarding.   Musculoskeletal:         General: No swelling or tenderness. Normal range of motion.      Cervical back: Normal range of motion and neck supple. No rigidity.      Right lower leg: No edema.      Left lower leg: No edema.   Lymphadenopathy:      Head:      Right side of head: No submental, submandibular, tonsillar, preauricular, posterior auricular or occipital adenopathy.      Left side of head: No submental, " submandibular, tonsillar, preauricular, posterior auricular or occipital adenopathy.      Cervical: No cervical adenopathy.      Right cervical: No superficial, deep or posterior cervical adenopathy.     Left cervical: No superficial, deep or posterior cervical adenopathy.      Upper Body:      Right upper body: No supraclavicular, axillary, pectoral or epitrochlear adenopathy.      Left upper body: No supraclavicular, axillary, pectoral or epitrochlear adenopathy.   Skin:     General: Skin is warm and dry.      Coloration: Skin is not jaundiced or pale.      Findings: No bruising.   Neurological:      Mental Status: He is alert and oriented to person, place, and time.      Gait: Gait normal.   Psychiatric:         Attention and Perception: Attention normal.         Mood and Affect: Mood normal.         Speech: Speech normal.         Behavior: Behavior normal.         Thought Content: Thought content normal.          ECOG:   ECOG SCORE                PROCEDURES/IMAGING      Discussion     Problem List:  Problem List Items Addressed This Visit          Cardiac/Vascular    Mixed hyperlipidemia    Relevant Orders    Ambulatory referral/consult to Smoking Cessation Program       Oncology    Leukocytosis - Primary       Endocrine    Type 2 diabetes mellitus without complication    Relevant Orders    Ambulatory referral/consult to Smoking Cessation Program       Other    Tobacco abuse    Relevant Orders    Ambulatory referral/consult to Smoking Cessation Program            Lymphocytosis, elevated ANC  - Intermittent elevations of wbc, anc since 2015. There is some correlation pt reports with frequent infection issues.  - Pt has had frequent skin infections being a  along with diabetes, harder time healing. Dec 22/Jan 23 hospitalization with wbc elevations 2/2 fish hook injury and infection.  - Pt smokes 2- 2.5 ppd of cigarettes, there is demonstration in studies of lymphocytosis due to this.  - No b  symptoms.   - Will trend cbc, complete patho review and order inflammatory markers.   - 6/8/23 wbc normal 11.76 K/uL, normal path review  - 9/5/23 wbc normal 11.09 k/uL, immature grans 0.6%, path review with inflammatory pattern  - 12/7/23 path review normal, no leukocytosis  - 1/9/24 wbc 12.93, pt with today resolved left middle finger cellulitis, I&D done a week ago; no B symptoms  - 4/5/24 wbc 9.9, immature grans stable 0.07 K/uL, no B symptoms  - rtc 4 months with labs    Tobacco abuse  - 2 to 2.5 ppd  - this can directly affect cbc indices which was explained to pt including increased risk for atherosclerosis, pvc, copd, cvd.  - would like to consider patches again, previously did not tolerate chantix  - smoking cessation program referral placed    Mixed HLD  - tolerating atorvastatin  - management deferred to cardiology    Diabetes with insulin  - A1c 8.3% (4/16/24)  - pt has reduced his insulin to what he feels is a comfortable dosing  - management deferred to pcp    Advance Care Planning     Date: 06/08/2023     Patient did not wish or was not able to name a surrogate decision maker or provide an Advance Care Plan.               Janey Lua, SILVIA-C  [facility]  Hematology/Oncology  [address]  [place]  [address] [phone]

## 2024-04-24 ENCOUNTER — OFFICE VISIT (OUTPATIENT)
Dept: PODIATRY | Facility: CLINIC | Age: 59
End: 2024-04-24
Payer: MEDICAID

## 2024-04-24 VITALS — HEIGHT: 63 IN | BODY MASS INDEX: 27.36 KG/M2 | WEIGHT: 154.44 LBS | RESPIRATION RATE: 18 BRPM

## 2024-04-24 DIAGNOSIS — Z72.0 TOBACCO ABUSE: ICD-10-CM

## 2024-04-24 DIAGNOSIS — G89.4 CHRONIC PAIN SYNDROME: ICD-10-CM

## 2024-04-24 DIAGNOSIS — L98.9 SKIN LESION OF FOOT: ICD-10-CM

## 2024-04-24 DIAGNOSIS — E11.65 TYPE 2 DIABETES MELLITUS WITH HYPERGLYCEMIA, UNSPECIFIED WHETHER LONG TERM INSULIN USE: Primary | ICD-10-CM

## 2024-04-24 PROCEDURE — 1159F MED LIST DOCD IN RCRD: CPT | Mod: CPTII,,, | Performed by: PODIATRIST

## 2024-04-24 PROCEDURE — 3008F BODY MASS INDEX DOCD: CPT | Mod: CPTII,,, | Performed by: PODIATRIST

## 2024-04-24 PROCEDURE — 99215 OFFICE O/P EST HI 40 MIN: CPT | Mod: PBBFAC,PN | Performed by: PODIATRIST

## 2024-04-24 PROCEDURE — 1160F RVW MEDS BY RX/DR IN RCRD: CPT | Mod: CPTII,,, | Performed by: PODIATRIST

## 2024-04-24 PROCEDURE — 99999 PR PBB SHADOW E&M-EST. PATIENT-LVL V: CPT | Mod: PBBFAC,,, | Performed by: PODIATRIST

## 2024-04-24 PROCEDURE — 3052F HG A1C>EQUAL 8.0%<EQUAL 9.0%: CPT | Mod: CPTII,,, | Performed by: PODIATRIST

## 2024-04-24 PROCEDURE — 99203 OFFICE O/P NEW LOW 30 MIN: CPT | Mod: S$PBB,,, | Performed by: PODIATRIST

## 2024-04-24 NOTE — PROGRESS NOTES
[facility] - PODIATRY  [address]  [address]  [place] [address]  Dept: [phone]  Dept Fax: [phone]    Fito Tiwari Jr., DPM     Assessment:   MDM    Coding  1. Type 2 diabetes mellitus with hyperglycemia, unspecified whether long term insulin use  Ambulatory referral/consult to Podiatry    Foot Exam Performed    Ambulatory referral/consult to Diabetes Education      2. Chronic pain syndrome  Foot Exam Performed      3. Tobacco abuse  Foot Exam Performed    Ambulatory referral/consult to Smoking Cessation Program      4. Skin lesion of foot - Left Foot  Foot Exam Performed          Plan:     Procedures  1. Type 2 diabetes mellitus with hyperglycemia, unspecified whether long term insulin use  -     Ambulatory referral/consult to Podiatry  -     Foot Exam Performed  -     Ambulatory referral/consult to Diabetes Education; Future; Expected date: 05/01/2024    2. Chronic pain syndrome  -     Foot Exam Performed    3. Tobacco abuse  -     Foot Exam Performed  -     Ambulatory referral/consult to Smoking Cessation Program; Future; Expected date: 05/01/2024    4. Skin lesion of foot - Left Foot  -     X-Ray Foot Complete Left; Future; Expected date: 04/24/2024  -     Foot Exam Performed      Ross was seen today for diabetic foot exam.    Diagnoses and all orders for this visit:    Type 2 diabetes mellitus with hyperglycemia, unspecified whether long term insulin use  -     Ambulatory referral/consult to Podiatry  -     Foot Exam Performed  -     Ambulatory referral/consult to Diabetes Education; Future    Chronic pain syndrome  -     Foot Exam Performed    Tobacco abuse  -     Foot Exam Performed  -     Ambulatory referral/consult to Smoking Cessation Program; Future    Skin lesion of foot - Left Foot  -     X-Ray Foot Complete Left; Future  -     Foot Exam Performed      ADA Risk Classification: No LOPS,No PAD, No deformity - 0: rtc 12 months    -pt seen,  evaluated, and managed  -dx discussed in detail. All questions/concerns addressed  -all tx options discussed. All alternatives, risks, benefits of all txs discussed  -comprehensive diabetic foot exam with risk assessment performed today  -the patient was educated about the diagnosis and discussed reducing caloric intake, increase physical activity  -We discussed conservative care options possible including but not limited to shoe wear and/or padding, bracing/strapping, at home ROM, formal PT, medical therapy, injection therapy  -labs reviewed by me: A1c of 8.3. recs as below    -Shoe inspection. Diabetic Foot Education. Patient reminded of the importance of good nutrition and blood sugar control to help prevent podiatric complications of diabetes. Patient instructed on proper foot hygeine. We discussed wearing proper shoe gear, daily foot inspections, never walking without protective shoe gear, never putting sharp instruments to feet.  -rxs dispensed: none  -referrals: dm education, smoking cessation  -WB: wbat        Follow up if symptoms worsen or fail to improve.    Subjective:      Patient ID: Ross Crystal is a 58 y.o. male.    Chief Complaint:   Chief Complaint   Patient presents with    Diabetic Foot Exam     Pcp Dr Ross Valverde last seen 04/22/2024       Ross Crystal presents to the clinic upon referral from Dr. Valverde  for evaluation and treatment of diabetic feet. Patient relates no major problem with feet. Only complaints today consist of L foot pain.      HPI    Last Podiatry Enc: Visit date not found  Last Enc w/ Me: Visit date not found    Outside reports reviewed: historical medical records.  Family hx: as below  Past Medical History:   Diagnosis Date    Benign paroxysmal vertigo, bilateral     Diabetes mellitus     Diabetes mellitus type I     Hyperlipemia     Renal disorder     Urinary tract infection      Past Surgical History:   Procedure Laterality Date    CARPAL TUNNEL RELEASE  25 YEARS AGO     "COLONOSCOPY      COLONOSCOPY N/A 2016    Procedure: COLONOSCOPY;  Surgeon: Luis Bogran-Reyes, MD;  Location: [facility];  Service: Endoscopy;  Laterality: N/A;    DEBRIDEMENT, PHALANX, HAND Left 2022    Procedure: DEBRIDEMENT, PHALANX, HAND;  Surgeon: Abdon Reid Jr., MD;  Location: [facility];  Service: Orthopedics;  Laterality: Left;    DEBRIDEMENT, PHALANX, HAND Left 2024    Procedure: DEBRIDEMENT, PHALANX, HAND;  Surgeon: Abdon Reid Jr., MD;  Location: [facility];  Service: Orthopedics;  Laterality: Left;    ESOPHAGOGASTRODUODENOSCOPY      HERNIA REPAIR      HERNIA REPAIR       Family History   Problem Relation Name Age of Onset    Alzheimer's disease Mother      Cancer Father          throat cancer    Prostate cancer Neg Hx      Kidney disease Neg Hx       Current Outpatient Medications   Medication Sig Dispense Refill    amoxicillin-clavulanate 875-125mg (AUGMENTIN) 875-125 mg per tablet Take 1 tablet by mouth 2 (two) times a day. 20 tablet 1    aspirin (ECOTRIN) 81 MG EC tablet Take 81 mg by mouth once daily.      atorvastatin (LIPITOR) 80 MG tablet Take 80 mg by mouth.      BAQSIMI 3 mg/actuation [place] 3 mg by Nasal route.      BD ULTRA-FINE JENNY PEN NEEDLE 32 gauge x 5/32" Ndle 4 (four) times daily.      DEXCOM G6  Misc       DEXCOM G6 SENSOR Sadaf SMARTSI Each Topical Every 10 Days      DEXCOM G6 TRANSMITTER Sadaf       fish oil-omega-3 fatty acids 300-1,000 mg capsule Take 1 capsule by mouth 2 (two) times a day.      flash glucose scanning reader (FREESTYLE JULIO 2 READER) Misc Use as instructed for blood glucose monitoring.      flash glucose sensor (FREESTYLE JULIO 14 DAY SENSOR) Kit Inject 1 each into the skin.      FREESTYLE JULIO 2 SENSOR Kit CHANGE EVERY 14 DAYS FOR BLOOD GLUCOSE MONITORING      HYDROcodone-acetaminophen (NORCO) 5-325 mg per tablet Take 1 tablet by mouth every 4 (four) hours as needed for Pain. 20 tablet 0    hydrocodone-acetaminophen 10-325mg (NORCO) "  mg Tab Take 1 tablet by mouth 2 (two) times a day.      insulin glargine 100 units/mL SubQ pen Inject 45 Units into the skin.      insulin syringe-needle U-100 1 mL 31 gauge x 5/16 Syrg       LEVEMIR FLEXTOUCH U-100 INSULN 100 unit/mL (3 mL) InPn pen Inject 35 Units into the skin 2 (two) times daily.      meclizine (ANTIVERT) 50 MG tablet Take 50 mg by mouth 2 (two) times daily.      meloxicam (MOBIC) 15 MG tablet Take 15 mg by mouth.      minocycline (MINOCIN,DYNACIN) 100 MG capsule Take 1 capsule (100 mg total) by mouth 2 (two) times a day. 28 capsule 1    naproxen (NAPROSYN) 500 MG tablet Take 1 tablet (500 mg total) by mouth 2 (two) times daily with meals. 20 tablet 0    nicotine (NICODERM CQ) 21 mg/24 hr Place 1 patch onto the skin once daily. 30 patch 3    NOVOLOG FLEXPEN U-100 INSULIN 100 unit/mL (3 mL) InPn pen SMARTSI-12 Unit(s) SUB-Q As Directed      pravastatin (PRAVACHOL) 40 MG tablet Take 40 mg by mouth every evening.      tamsulosin (FLOMAX) 0.4 mg Cap Take 0.4 mg by mouth once daily.      TRUE METRIX GLUCOSE TEST STRIP Strp 1 strip 4 (four) times daily.      TRUEPLUS LANCETS 33 gauge Misc USE TO TEST BLOOD SUGAR TWO TIMES DAILY       No current facility-administered medications for this visit.     Review of patient's allergies indicates:   Allergen Reactions    Sulfamethoxazole      Other Reaction(s): hives rash    Trimethoprim      Other Reaction(s): hives rash    Bactrim [sulfamethoxazole-trimethoprim] Rash     Social History     Socioeconomic History    Marital status: Single    Years of education: 9th   Tobacco Use    Smoking status: Every Day     Current packs/day: 2.50     Average packs/day: 2.5 packs/day for 35.3 years (88.3 ttl pk-yrs)     Types: Cigarettes     Start date:     Smokeless tobacco: Never    Tobacco comments:     Pt declines referral to Ambulatory Smoking Cessation clinic, stating that he would like to attempt quitting on his own first. Handout provided.   Substance  and Sexual Activity    Alcohol use: No     Alcohol/week: 0.0 standard drinks of alcohol    Drug use: No     Comment: 30YEARS AGO, PATIENT ON PRESCRIBED PAIN MEDICATION.     Sexual activity: Not Currently     Social Determinants of Health     Financial Resource Strain: Low Risk  (4/28/2022)    Overall Financial Resource Strain (CARDIA)     Difficulty of Paying Living Expenses: Not very hard   Food Insecurity: No Food Insecurity (4/28/2022)    Hunger Vital Sign     Worried About Running Out of Food in the Last Year: Never true     Ran Out of Food in the Last Year: Never true   Transportation Needs: No Transportation Needs (4/28/2022)    PRAPARE - Transportation     Lack of Transportation (Medical): No     Lack of Transportation (Non-Medical): No   Physical Activity: Inactive (4/28/2022)    Exercise Vital Sign     Days of Exercise per Week: 0 days     Minutes of Exercise per Session: 0 min   Stress: No Stress Concern Present (4/28/2022)    [place] [employer] - Occupational Stress Questionnaire     Feeling of Stress : Only a little   Social Connections: Socially Isolated (4/28/2022)    Social Connection and Isolation Panel [NHANES]     Frequency of Communication with Friends and Family: More than three times a week     Frequency of Social Gatherings with Friends and Family: More than three times a week     Attends Sabianism Services: Never     Active Member of Clubs or Organizations: No     Attends Club or Organization Meetings: Never     Marital Status: Never    Housing Stability: Unknown (4/28/2022)    Housing Stability Vital Sign     Unable to Pay for Housing in the Last Year: No     Unstable Housing in the Last Year: No       ROS    REVIEW OF SYSTEMS: Negative as documented below as well as positive findings in bold.       Constitutional  Respiratory  Gastrointestinal  Skin   - Fever - Cough - Heartburn - Rash   - Chills - Spit blood - Nausea - Itching   - Weight Loss - Shortness of  "breath - Vomiting - Nail pain   - Malaise/Fatigue - Wheezing - Abdominal Pain  Wound/Ulcer   - Weight Gain   - Blood in Stool  Poor wound healing       - Diarrhea          Cardiovascular  Genitourinary  Neurological  HEENT   - Chest Pain - Dysuria - Burning Sensation of feet - Headache   - Palpitations - Hematuria - Tingling / Paresthesia - Congestion   - Pain at night in legs - Flank Pain - Dizziness - Sore Throat   - Cramping   - Tremor - Blurred Vision   - Leg Swelling   - Sensory Change - Double Vision   - Dizzy when standing   - Speech Change - Eye Redness       - Focal Weakness - Dry Eyes       - Loss of Consciousness          Endocrine  Musculoskeletal  Psychiatric   - Cold intolerance - Muscle Pain - Depression   - Heat intolerance - Neck Pain - Insomnia   - Anemia - Joint Pain - Memory Loss   -  Easy bruising, bleeding - Heel pain - Anxiety      Toe Pain        Leg/Ankle/Foot Pain         Objective:     Resp 18   Ht 5' 3" (1.6 m)   Wt 70.1 kg (154 lb 6.9 oz)   BMI 27.36 kg/m²   Vitals:    04/24/24 1116   Resp: 18   Weight: 70.1 kg (154 lb 6.9 oz)   Height: 5' 3" (1.6 m)   PainSc: 0-No pain       Physical Exam    General Appearance:   Patient appears well developed, well nourished  Patient appears stated age    Psychiatric:   Patient is oriented to time, place, and person.  Patient has appropriate mood and affect    Neck:  Trachea Midline  No visible masses    Respiratory/Ears:  No distress or labored breathing.  Able to differentiate between normal talking voice and whisper.  Able to follow commands    Eyes:  Visual Acuity intact  Lids and conjunctivae normal. No discoloration noted.    Physical Exam  Vitals and nursing note reviewed.   Feet:      Right foot:      Protective Sensation: 10 sites tested.  10 sites sensed.      Left foot:      Protective Sensation: 10 sites tested.  10 sites sensed.   Psychiatric:         Thought Content: Thought content normal.         Judgment: Judgment normal. "       Ortho Exam  General    Nursing note and vitals reviewed.  Psychiatric: Judgment and thought content normal.             Foot Exam  Foot/Ankle Musculoskeletal Exam    B/l LE exam con't:  V:  DP 2/4, PT 2/4   CRT< 3s to all digits tested   Tibial and popliteal lymph nodes are w/o abnormality    hair growth present bilaterally, coloration normal, edema absent bilaterally, varicosities present bilaterally    N:  Patient displays normal ankle reflexes   SILT in SP/DP/T/Dayanna/Saph distributions    Ortho: +Motor EHL/FHL/TA/GA   no TTP of foot or ankles   Compartments soft/compressible. No pain on passive stretch of big toe. No calf  Pain.     Derm:  skin intact, skin warm and dry, skin without ulcers, skin without induration, nails normal, no erythema and no ecchymosis, +hyperkeratotic skin lesion L foot    Imaging / Labs:    Hemoglobin A1C   Date Value Ref Range Status   04/05/2024 8.3 (H) 4.0 - 5.6 % Final     Comment:     ADA Screening Guidelines:  5.7-6.4%  Consistent with prediabetes  >or=6.5%  Consistent with diabetes    High levels of fetal hemoglobin interfere with the HbA1C  assay. Heterozygous hemoglobin variants (HbS, HgC, etc)do  not significantly interfere with this assay.   However, presence of multiple variants may affect accuracy.     01/18/2024 8.0 (H) <5.7 % Final   10/26/2023 8.6 (H) <5.7 % Final   04/04/2023 8.7 (H) 4.0 - 5.6 % Final     Comment:     ADA Screening Guidelines:  5.7-6.4%  Consistent with prediabetes  >or=6.5%  Consistent with diabetes    High levels of fetal hemoglobin interfere with the HbA1C  assay. Heterozygous hemoglobin variants (HbS, HgC, etc)do  not significantly interfere with this assay.   However, presence of multiple variants may affect accuracy.     01/05/2023 8.9 (H) 4.0 - 5.6 % Final     Comment:     ADA Screening Guidelines:  5.7-6.4%  Consistent with prediabetes  >or=6.5%  Consistent with diabetes    High levels of fetal hemoglobin interfere with the HbA1C  assay.  Heterozygous hemoglobin variants (HbS, HgC, etc)do  not significantly interfere with this assay.   However, presence of multiple variants may affect accuracy.         No results found.      Note: This was dictated using a computer transcription program. Although proofread, it may contain computer transcription errors and phonetic errors. Other human proofreading errors may also exist. Corrections may be performed at a later time. Please contact us for any clarification if needed.    Fito Tiwari DPM  [facility]

## 2024-04-24 NOTE — PATIENT INSTRUCTIONS
Diabetes: Inspecting Your Feet      Diabetes increases your chances of developing foot problems. So inspect your feet every day. This helps you find small skin irritations before they become serious ulcers or infections. If you have trouble seeing the bottoms of your feet, use a mirror or ask a family member or friend to help.  How to check your feet  Below are tips to help you look for foot problems. Try to check your feet at the same time each day, such as when you get out of bed in the morning:  Check the top of each foot. The tops of toes, back of the heel, and outer edge of the foot can get a lot of rubbing from poor-fitting shoes.  Check the bottom of each foot. Daily wear and tear often leads to problems at pressure spots.  Check the toes and nails. Fungal infections often occur between toes. Toenail problems can also be a sign of fungal infections or lead to breaks in the skin.  Check your shoes, too. Loose objects inside a shoe can injure the foot. Use your hand to feel inside your shoes for things like mike, loose stitching, or rough areas that could irritate your skin.  Warning signs  Look for any color changes in the foot. Redness with streaks can signal a severe infection, which needs immediate medical attention. Tell your healthcare provider right away if you have any of these problems:  Swelling, sometimes with color changes, may be a sign of poor blood flow or infection. Symptoms include tenderness and an increase in the size of your foot.  Warm or hot areas on your feet may be signs of infection. A foot that is cold may not be getting enough blood.  Sensations such as burning, tingling, or pins and needles can be signs of a problem. Also check for areas that may be numb.  Hot spots are caused by friction or pressure. Look for hot spots in areas that get a lot of rubbing. Hot spots can turn into blisters, calluses, or sores.  Cracks and sores are caused by dry or irritated skin. They are a sign  that the skin is breaking down, which can lead to infection.  Toenail problems to watch for include nails growing into the skin (ingrown toenail) and causing redness or pain. Thick, yellow, or discolored nails can signal a fungal infection.  Drainage and odor can develop from untreated sores and ulcers. Call your healthcare provider right away if you notice white or yellow drainage, bleeding, or unpleasant odor.   Date Last Reviewed: 6/1/2016 © 2000-2017 [employer]. [address] [place] [address]. All rights reserved. This information is not intended as a substitute for professional medical care. Always follow your healthcare professional's instructions.    Long-Term Complications of Diabetes    Diabetes can cause health problems over time. These are called complications. They are more likely to happen if your blood sugar is often too high. Over time, high blood sugar can damage blood vessels in your body. It is important to keep your blood sugar in your target range. This can help prevent or delay complications from diabetes.  Possible complications  Complications of diabetes include:    Eye problems, including damage to the blood vessels in the eyes (retinopathy), pressure in the eye (glaucoma), and clouding of the eye's lens (a cataract). Eye problems can eventually lead to irreversible blindness.   Tooth and gum problems (periodontal disease), causing loss of teeth and bone  Blood vessel (vascular) disease leading to circulation problems, heart attack or stroke, or a need for amputation of a limb   Problems with sexual function leading to erectile dysfunction in men and sexual discomfort in women   Kidney disease (nephropathy) can eventually lead to kidney failure, which may require dialysis or kidney transplant   Nerve problems (neuropathy), causing pain or loss of feeling in your feet and other parts of your body, potentially leading to an amputation of a limb   High blood pressure  (hypertension), putting strain on your heart and blood vessels  Serious infections, possibly leading to loss of toes, feet, or limbs    How to avoid complications  The serious consequences of these complications may be avoidable for most people with diabetes by managing your blood glucose, blood pressure, and cholesterol levels. This can help you feel better and stay healthy. You can manage diabetes by tracking your blood sugar. You can also eat healthy and exercise to avoid gaining weight. And you should take medicine if directed by your healthcare provider.

## 2024-05-06 ENCOUNTER — CLINICAL SUPPORT (OUTPATIENT)
Dept: SMOKING CESSATION | Facility: CLINIC | Age: 59
End: 2024-05-06

## 2024-05-06 DIAGNOSIS — F17.200 NICOTINE DEPENDENCE: Primary | ICD-10-CM

## 2024-05-06 PROCEDURE — 99999 PR PBB SHADOW E&M-EST. PATIENT-LVL I: CPT | Mod: PBBFAC,,,

## 2024-05-16 ENCOUNTER — OFFICE VISIT (OUTPATIENT)
Dept: ORTHOPEDICS | Facility: CLINIC | Age: 59
End: 2024-05-16
Payer: MEDICAID

## 2024-05-16 VITALS — WEIGHT: 156.5 LBS | HEIGHT: 63 IN | BODY MASS INDEX: 27.73 KG/M2

## 2024-05-16 DIAGNOSIS — L02.512 ABSCESS OF FINGER OF LEFT HAND: Primary | ICD-10-CM

## 2024-05-16 DIAGNOSIS — G56.03 BILATERAL CARPAL TUNNEL SYNDROME: ICD-10-CM

## 2024-05-16 PROCEDURE — 3008F BODY MASS INDEX DOCD: CPT | Mod: CPTII,,, | Performed by: ORTHOPAEDIC SURGERY

## 2024-05-16 PROCEDURE — 99214 OFFICE O/P EST MOD 30 MIN: CPT | Mod: PBBFAC,PN | Performed by: ORTHOPAEDIC SURGERY

## 2024-05-16 PROCEDURE — 99999 PR PBB SHADOW E&M-EST. PATIENT-LVL IV: CPT | Mod: PBBFAC,,, | Performed by: ORTHOPAEDIC SURGERY

## 2024-05-16 PROCEDURE — 3052F HG A1C>EQUAL 8.0%<EQUAL 9.0%: CPT | Mod: CPTII,,, | Performed by: ORTHOPAEDIC SURGERY

## 2024-05-16 PROCEDURE — 1159F MED LIST DOCD IN RCRD: CPT | Mod: CPTII,,, | Performed by: ORTHOPAEDIC SURGERY

## 2024-05-16 PROCEDURE — 99213 OFFICE O/P EST LOW 20 MIN: CPT | Mod: S$PBB,,, | Performed by: ORTHOPAEDIC SURGERY

## 2024-05-16 RX ORDER — MELOXICAM 15 MG/1
15 TABLET ORAL DAILY
Qty: 30 TABLET | Refills: 1 | Status: SHIPPED | OUTPATIENT
Start: 2024-05-16 | End: 2024-07-15

## 2024-05-16 NOTE — PROGRESS NOTES
"Subjective:      Patient ID: oRss Crystal is a 58 y.o. male.  Chief Complaint: Post-op Evaluation and Pain of the Left Hand (12 week po/Left index ) and Pain of the Right Hand      HPI  Ross Crystal is a  58 y.o. male presenting today for follow up of excision ulcer of the left middle finger.  He reports that he is doing well with his middle finger but now having some pain in both hands he reports some stiffness in the morning and occasional numbness tingling as well   .    Review of patient's allergies indicates:   Allergen Reactions    Sulfamethoxazole      Other Reaction(s): hives rash    Trimethoprim      Other Reaction(s): hives rash    Bactrim [sulfamethoxazole-trimethoprim] Rash         Current Outpatient Medications   Medication Sig Dispense Refill    amoxicillin-clavulanate 875-125mg (AUGMENTIN) 875-125 mg per tablet Take 1 tablet by mouth 2 (two) times a day. 20 tablet 1    aspirin (ECOTRIN) 81 MG EC tablet Take 81 mg by mouth once daily.      atorvastatin (LIPITOR) 80 MG tablet Take 80 mg by mouth.      BAQSIMI 3 mg/actuation [place] 3 mg by Nasal route.      BD ULTRA-FINE JENNY PEN NEEDLE 32 gauge x 5/32" Ndle 4 (four) times daily.      DEXCOM G6  Misc       DEXCOM G6 SENSOR Sadaf SMARTSI Each Topical Every 10 Days      DEXCOM G6 TRANSMITTER Sadaf       fish oil-omega-3 fatty acids 300-1,000 mg capsule Take 1 capsule by mouth 2 (two) times a day.      flash glucose scanning reader (FREESTYLE JULIO 2 READER) Misc Use as instructed for blood glucose monitoring.      flash glucose sensor (FREESTYLE JULIO 14 DAY SENSOR) Kit Inject 1 each into the skin.      FREESTYLE JULIO 2 SENSOR Kit CHANGE EVERY 14 DAYS FOR BLOOD GLUCOSE MONITORING      HYDROcodone-acetaminophen (NORCO) 5-325 mg per tablet Take 1 tablet by mouth every 4 (four) hours as needed for Pain. 20 tablet 0    hydrocodone-acetaminophen 10-325mg (NORCO)  mg Tab Take 1 tablet by mouth 2 (two) times a day.      insulin glargine 100 units/mL " SubQ pen Inject 45 Units into the skin.      insulin syringe-needle U-100 1 mL 31 gauge x 5/16 Syrg       LEVEMIR FLEXTOUCH U-100 INSULN 100 unit/mL (3 mL) InPn pen Inject 35 Units into the skin 2 (two) times daily.      meclizine (ANTIVERT) 50 MG tablet Take 50 mg by mouth 2 (two) times daily.      meloxicam (MOBIC) 15 MG tablet Take 15 mg by mouth.      meloxicam (MOBIC) 15 MG tablet Take 1 tablet (15 mg total) by mouth once daily. 30 tablet 1    minocycline (MINOCIN,DYNACIN) 100 MG capsule Take 1 capsule (100 mg total) by mouth 2 (two) times a day. 28 capsule 1    naproxen (NAPROSYN) 500 MG tablet Take 1 tablet (500 mg total) by mouth 2 (two) times daily with meals. 20 tablet 0    nicotine (NICODERM CQ) 21 mg/24 hr Place 1 patch onto the skin once daily. 30 patch 3    NOVOLOG FLEXPEN U-100 INSULIN 100 unit/mL (3 mL) InPn pen SMARTSI-12 Unit(s) SUB-Q As Directed      pravastatin (PRAVACHOL) 40 MG tablet Take 40 mg by mouth every evening.      tamsulosin (FLOMAX) 0.4 mg Cap Take 0.4 mg by mouth once daily.      TRUE METRIX GLUCOSE TEST STRIP Strp 1 strip 4 (four) times daily.      TRUEPLUS LANCETS 33 gauge Misc USE TO TEST BLOOD SUGAR TWO TIMES DAILY       No current facility-administered medications for this visit.       Past Medical History:   Diagnosis Date    Benign paroxysmal vertigo, bilateral     Diabetes mellitus     Diabetes mellitus type I     Hyperlipemia     Renal disorder     Urinary tract infection        Past Surgical History:   Procedure Laterality Date    CARPAL TUNNEL RELEASE  25 YEARS AGO    COLONOSCOPY      COLONOSCOPY N/A 2016    Procedure: COLONOSCOPY;  Surgeon: Luis Bogran-Reyes, MD;  Location: [facility];  Service: Endoscopy;  Laterality: N/A;    DEBRIDEMENT, PHALANX, HAND Left 2022    Procedure: DEBRIDEMENT, PHALANX, HAND;  Surgeon: Abdon Reid Jr., MD;  Location: [facility] OR;  Service: Orthopedics;  Laterality: Left;    DEBRIDEMENT, PHALANX, HAND Left 2024    Procedure:  "DEBRIDEMENT, PHALANX, HAND;  Surgeon: Abdon Reid Jr., MD;  Location: [facility];  Service: Orthopedics;  Laterality: Left;    ESOPHAGOGASTRODUODENOSCOPY      HERNIA REPAIR      HERNIA REPAIR         OBJECTIVE:   PHYSICAL EXAM:  Height: 5' 3" (160 cm) Weight: 71 kg (156 lb 8.4 oz)  Vitals:    05/16/24 1107   Weight: 71 kg (156 lb 8.4 oz)   Height: 5' 3" (1.6 m)   PainSc: 10-Worst pain ever     Ortho/SPM Exam  Examination hands there is some mild swelling bilaterally some slight bony enlargement the small joints range of motion fingers full   Tinel sign mildly positive   Phalen's test negative   No atrophy noted       RADIOGRAPHS:  None  Comments: I have personally reviewed the imaging and I agree with the above radiologist's report.    ASSESSMENT/PLAN:     IMPRESSION:  1. Status post ulcer excision left middle finger doing well.      2. Possible bilateral carpal tunnel syndrome    PLAN:  I have ordered nerve conduction studies both hands   Recommended wrist splints at night   Started him on Mobic 15 mg once a day with food       FOLLOW UP:  After the nerve test is complete    Disclaimer: This note has been generated using voice-recognition software. There may be typographical errors that have been missed during proof-reading.     "

## 2024-05-30 ENCOUNTER — CLINICAL SUPPORT (OUTPATIENT)
Dept: SMOKING CESSATION | Facility: CLINIC | Age: 59
End: 2024-05-30
Payer: COMMERCIAL

## 2024-05-30 DIAGNOSIS — F17.200 NICOTINE DEPENDENCE: Primary | ICD-10-CM

## 2024-05-30 RX ORDER — IBUPROFEN 200 MG
1 TABLET ORAL DAILY
Qty: 28 PATCH | Refills: 0 | Status: SHIPPED | OUTPATIENT
Start: 2024-05-30

## 2024-05-30 NOTE — Clinical Note
Pt seen in clinic for tobacco cessation quit #1. Pt currently smoke 2.5 packs of cigarettes per day and will begin biweekly tobacco cessation sessions. Pt agreed to take prescribed tobacco cessation medication regimen of 21 mg nicotine patches. Prescribed medications will be managed by physician and monitored by CTTS.

## 2024-06-13 ENCOUNTER — CLINICAL SUPPORT (OUTPATIENT)
Dept: SMOKING CESSATION | Facility: CLINIC | Age: 59
End: 2024-06-13
Payer: COMMERCIAL

## 2024-06-13 DIAGNOSIS — F17.200 NICOTINE DEPENDENCE: Primary | ICD-10-CM

## 2024-06-19 ENCOUNTER — CLINICAL SUPPORT (OUTPATIENT)
Dept: SMOKING CESSATION | Facility: CLINIC | Age: 59
End: 2024-06-19

## 2024-06-19 DIAGNOSIS — F17.200 NICOTINE DEPENDENCE: Primary | ICD-10-CM

## 2024-08-13 ENCOUNTER — TELEPHONE (OUTPATIENT)
Dept: HEMATOLOGY/ONCOLOGY | Facility: CLINIC | Age: 59
End: 2024-08-13
Payer: MEDICAID

## 2024-08-29 ENCOUNTER — OFFICE VISIT (OUTPATIENT)
Dept: UROLOGY | Facility: CLINIC | Age: 59
End: 2024-08-29
Attending: SPECIALIST
Payer: MEDICAID

## 2024-08-29 VITALS
WEIGHT: 163.13 LBS | SYSTOLIC BLOOD PRESSURE: 110 MMHG | DIASTOLIC BLOOD PRESSURE: 66 MMHG | OXYGEN SATURATION: 98 % | HEIGHT: 63 IN | BODY MASS INDEX: 28.9 KG/M2 | HEART RATE: 76 BPM

## 2024-08-29 DIAGNOSIS — R35.1 BPH ASSOCIATED WITH NOCTURIA: ICD-10-CM

## 2024-08-29 DIAGNOSIS — N40.0 BENIGN PROSTATIC HYPERPLASIA, UNSPECIFIED WHETHER LOWER URINARY TRACT SYMPTOMS PRESENT: Primary | ICD-10-CM

## 2024-08-29 DIAGNOSIS — N40.1 BPH ASSOCIATED WITH NOCTURIA: ICD-10-CM

## 2024-08-29 PROCEDURE — 99999 PR PBB SHADOW E&M-EST. PATIENT-LVL V: CPT | Mod: PBBFAC,,, | Performed by: SPECIALIST

## 2024-08-29 PROCEDURE — 3074F SYST BP LT 130 MM HG: CPT | Mod: CPTII,,, | Performed by: SPECIALIST

## 2024-08-29 PROCEDURE — 99204 OFFICE O/P NEW MOD 45 MIN: CPT | Mod: S$PBB,,, | Performed by: SPECIALIST

## 2024-08-29 PROCEDURE — 1159F MED LIST DOCD IN RCRD: CPT | Mod: CPTII,,, | Performed by: SPECIALIST

## 2024-08-29 PROCEDURE — 3008F BODY MASS INDEX DOCD: CPT | Mod: CPTII,,, | Performed by: SPECIALIST

## 2024-08-29 PROCEDURE — 3078F DIAST BP <80 MM HG: CPT | Mod: CPTII,,, | Performed by: SPECIALIST

## 2024-08-29 PROCEDURE — 3052F HG A1C>EQUAL 8.0%<EQUAL 9.0%: CPT | Mod: CPTII,,, | Performed by: SPECIALIST

## 2024-08-29 PROCEDURE — 99215 OFFICE O/P EST HI 40 MIN: CPT | Mod: PBBFAC | Performed by: SPECIALIST

## 2024-08-29 PROCEDURE — 1160F RVW MEDS BY RX/DR IN RCRD: CPT | Mod: CPTII,,, | Performed by: SPECIALIST

## 2024-08-29 RX ORDER — FINASTERIDE 5 MG/1
5 TABLET, FILM COATED ORAL DAILY
Qty: 30 TABLET | Refills: 11 | Status: SHIPPED | OUTPATIENT
Start: 2024-08-29 | End: 2025-08-29

## 2024-08-29 RX ORDER — TAMSULOSIN HYDROCHLORIDE 0.4 MG/1
0.4 CAPSULE ORAL 2 TIMES DAILY
Qty: 60 CAPSULE | Refills: 11 | Status: SHIPPED | OUTPATIENT
Start: 2024-08-29 | End: 2025-08-29

## 2024-08-29 NOTE — PROGRESS NOTES
[facility] - Urology   Clinic Note    SUBJECTIVE:     Chief Complaint   Patient presents with    Urinary Frequency     States he notices he has to urinate more in the afternoon.        Referral from: Ross Valverde MD.    History of Present Illness:  Ross Crystal is a 59 y.o. male who presents to clinic for progressive LUTS.    He's been taking tamsulosin 0.4 mg q.h.s. for several years.  He denies dizziness or other side effects.  He denies taking a 5 alpha reductase inhibitor.  He typically gets up 1 time a night to void.  He mentions that he is really tired during the day and needs to take a nap around 10:00 a.m..  I asked if his nocturia was affecting his sleep and causing daytime somnolence.  He said no, and would like to take testosterone to increase his energy level.  He then disclose that he used to have a cocaine habit and that he is  used to functioning with a high energy level.  Likewise, I think this is more of a bothersome issue rather than his BPH.  He denies a history of hematuria, UTIs, prostatitis, or retention.  He denies a history of nephrolithiasis.  He denies a family history of prostate cancer.  His PSAs have been within normal limits although I do not see a lab value from this past year.  He mentions he will bring a copy with him from his primary care physician's office.     Patient endorses no additional complaints at this time.    Past Medical History:   Diagnosis Date    Benign paroxysmal vertigo, bilateral     Diabetes mellitus     Diabetes mellitus type I     Hyperlipemia     Renal disorder     Urinary tract infection        Past Surgical History:   Procedure Laterality Date    CARPAL TUNNEL RELEASE  25 YEARS AGO    COLONOSCOPY      COLONOSCOPY N/A 1/25/2016    Procedure: COLONOSCOPY;  Surgeon: Luis Bogran-Reyes, MD;  Location: [facility];  Service: Endoscopy;  Laterality: N/A;    DEBRIDEMENT, PHALANX, HAND Left 12/29/2022    Procedure: DEBRIDEMENT, PHALANX, HAND;  Surgeon:  "Abdon Reid Jr., MD;  Location: [facility] OR;  Service: Orthopedics;  Laterality: Left;    DEBRIDEMENT, PHALANX, HAND Left 2024    Procedure: DEBRIDEMENT, PHALANX, HAND;  Surgeon: Abdon Reid Jr., MD;  Location: [facility] OR;  Service: Orthopedics;  Laterality: Left;    ESOPHAGOGASTRODUODENOSCOPY      HERNIA REPAIR      HERNIA REPAIR         Family History   Problem Relation Name Age of Onset    Alzheimer's disease Mother      Cancer Father          throat cancer    Prostate cancer Neg Hx      Kidney disease Neg Hx         Social History     Tobacco Use    Smoking status: Every Day     Current packs/day: 2.50     Average packs/day: 2.5 packs/day for 35.7 years (89.1 ttl pk-yrs)     Types: Cigarettes     Start date:     Smokeless tobacco: Never    Tobacco comments:     Tobacco cessation   Substance Use Topics    Alcohol use: No     Alcohol/week: 0.0 standard drinks of alcohol    Drug use: No     Comment: 30YEARS AGO, PATIENT ON PRESCRIBED PAIN MEDICATION.        Current Outpatient Medications on File Prior to Visit   Medication Sig Dispense Refill    amoxicillin-clavulanate 875-125mg (AUGMENTIN) 875-125 mg per tablet Take 1 tablet by mouth 2 (two) times a day. 20 tablet 1    aspirin (ECOTRIN) 81 MG EC tablet Take 81 mg by mouth once daily.      atorvastatin (LIPITOR) 80 MG tablet Take 80 mg by mouth.      BD ULTRA-FINE JENNY PEN NEEDLE 32 gauge x 5/32" Ndle 4 (four) times daily.      DEXCOM G6  Misc       DEXCOM G6 SENSOR Sadaf SMARTSI Each Topical Every 10 Days      DEXCOM G6 TRANSMITTER Sadaf       fish oil-omega-3 fatty acids 300-1,000 mg capsule Take 1 capsule by mouth 2 (two) times a day.      flash glucose scanning reader (FREESTYLE JULIO 2 READER) Misc Use as instructed for blood glucose monitoring.      flash glucose sensor (FREESTYLE JULIO 14 DAY SENSOR) Kit Inject 1 each into the skin.      FREESTYLE JULIO 2 SENSOR Kit CHANGE EVERY 14 DAYS FOR BLOOD GLUCOSE MONITORING      " HYDROcodone-acetaminophen (NORCO) 5-325 mg per tablet Take 1 tablet by mouth every 4 (four) hours as needed for Pain. 20 tablet 0    hydrocodone-acetaminophen 10-325mg (NORCO)  mg Tab Take 1 tablet by mouth 2 (two) times a day.      insulin glargine 100 units/mL SubQ pen Inject 45 Units into the skin.      insulin syringe-needle U-100 1 mL 31 gauge x 5/16 Syrg       LEVEMIR FLEXTOUCH U-100 INSULN 100 unit/mL (3 mL) InPn pen Inject 35 Units into the skin 2 (two) times daily.      meclizine (ANTIVERT) 50 MG tablet Take 50 mg by mouth 2 (two) times daily.      meloxicam (MOBIC) 15 MG tablet Take 15 mg by mouth.      minocycline (MINOCIN,DYNACIN) 100 MG capsule Take 1 capsule (100 mg total) by mouth 2 (two) times a day. 28 capsule 1    naproxen (NAPROSYN) 500 MG tablet Take 1 tablet (500 mg total) by mouth 2 (two) times daily with meals. 20 tablet 0    nicotine (NICODERM CQ) 21 mg/24 hr Place 1 patch onto the skin once daily. 28 patch 0    NOVOLOG FLEXPEN U-100 INSULIN 100 unit/mL (3 mL) InPn pen SMARTSI-12 Unit(s) SUB-Q As Directed      pravastatin (PRAVACHOL) 40 MG tablet Take 40 mg by mouth every evening.      tamsulosin (FLOMAX) 0.4 mg Cap Take 0.4 mg by mouth once daily.      TRUE METRIX GLUCOSE TEST STRIP Strp 1 strip 4 (four) times daily.      TRUEPLUS LANCETS 33 gauge Misc USE TO TEST BLOOD SUGAR TWO TIMES DAILY      BAQSIMI 3 mg/actuation [place] 3 mg by Nasal route. (Patient not taking: Reported on 2024)       No current facility-administered medications on file prior to visit.       Review of patient's allergies indicates:   Allergen Reactions    Sulfamethoxazole      Other Reaction(s): hives rash    Trimethoprim      Other Reaction(s): hives rash    Bactrim [sulfamethoxazole-trimethoprim] Rash       Review of Systems   All other systems reviewed and are negative.       Review of Systems:  A review of 10+ systems was conducted with pertinent positive and negative findings documented in HPI with all  "other systems reviewed and negative.    OBJECTIVE:     Estimated body mass index is 28.9 kg/m² as calculated from the following:    Height as of this encounter: 5' 3" (1.6 m).    Weight as of this encounter: 74 kg (163 lb 2.3 oz).    Vital Signs (Most Recent)  Vitals:    08/29/24 0928   BP: 110/66   Pulse: 76       Physical Exam:    Physical Exam     GENERAL: patient sitting comfortably  HEENT: normocephalic  NECK: supple, no JVD  PULM: normal chest rise, no increased WOB  HEART: non-diaphoretic  ABDO: soft, nondistended, nontender  BACK: no CVA tenderness bilaterally  SKIN: warm, dry, well perfused  EXT: no bruising or edema  NEURO: grossly normal with no focal deficits  PSYCH: appropriate mood and affect    Genitourinary Exam:  TEQUILA: normal sphincter tone, smooth, rubbery prostate, no nodules 50 gms, bengn      LABS:     Lab Results   Component Value Date    BUN 19 08/12/2024    BUN 19 08/12/2024    CREATININE 0.9 08/12/2024    CREATININE 0.9 08/12/2024    WBC 11.20 08/12/2024    HGB 16.2 08/12/2024    HCT 48.7 08/12/2024     08/12/2024    AST 20 08/12/2024    AST 21 08/12/2024    ALT 27 08/12/2024    ALT 27 08/12/2024    ALKPHOS 83 08/12/2024    ALKPHOS 83 08/12/2024    ALBUMIN 3.8 08/12/2024    ALBUMIN 3.8 08/12/2024    HGBA1C 8.5 (H) 08/12/2024    INR 1.0 02/22/2018        Urinalysis:   No results found for: "UAREFLEX"     PSA:  Lab Results   Component Value Date    PSA 0.94 10/19/2015    PSADIAG 0.78 09/16/2014       Testosterone:  No results found for: "TOTALTESTOST", "TESTOSTERONE"     Imaging:  I have personally reviewed all relevant imaging studies.    No results found for this or any previous visit (from the past 2160 hour(s)).  No results found for this or any previous visit (from the past 2160 hour(s)).  X-Ray Foot Complete Left  Narrative: EXAMINATION:  XR FOOT COMPLETE 3 VIEW LEFT    CLINICAL HISTORY:  .  Disorder of the skin and subcutaneous tissue, unspecified    TECHNIQUE:  Weightbearing AP, " lateral and oblique views of the left foot were performed.    COMPARISON:  08/12/2019.    FINDINGS:  The bones are intact.  There is no evidence for acute fracture or bone destruction.  There is no evidence for dislocation.  There are mild degenerative changes at the left 1st metatarsophalangeal joint.  No bony erosions are identified.  There is a small calcaneal spur at the insertion of the plantar fascia.  Soft tissues are unremarkable.  Impression: No evidence for acute fracture, bone destruction, or dislocation.    Mild degenerative changes of the left 1st MTP joint.    Small calcaneal spur at the insertion of the plantar fascia.    Electronically signed by: Khai Ag MD  Date:    04/25/2024  Time:    09:41         ASSESSMENT     1. Benign prostatic hyperplasia, unspecified whether lower urinary tract symptoms present    2. BPH associated with nocturia        PLAN:     Consider increasing dose of tamsulosin to 0.4 mg b.i.d..  Patient had a moderate to large size prostate on digital rectal exam, consider adding a 5 alpha reductase inhibitor  Consider referral to endocrinology for hormone replacement.  Follow up in 1 month with a copy of his recent PSA from his primary care provider's office.    Misha Armstrong MD  Urology  [facility]     Disclaimer: This note has been generated using voice-recognition software. There may be typographical errors that have been missed during proof-reading.

## 2024-10-02 NOTE — ASSESSMENT & PLAN NOTE
He has been able to discontinue all BP medications. BP controlled in clinic. No recent UACR in chart. Will obtain today.

## 2024-10-02 NOTE — PROGRESS NOTES
Subjective:      Patient ID: Ross Crystal is a 59 y.o. male.    Chief Complaint:  T2DM    History of Present Illness  This is a 59 y.o. male. with a past medical history of T2DM, HTN, HLD, Neuropathy here for evaluation of T2DM.    Type 2 diabetes mellitus  Diagnosed around age 47    Current diabetes medications:  - Lantus 26 units once daily in AM   - Novolog 5-6 units ACTID    Past diabetes medications:  - Metformin - failure   - Glipizide- failure   - Farxiga intolerance, but he does not remember what (Happened 10 years ago)    Lab Results   Component Value Date    CREATININE 0.9 08/12/2024    CREATININE 0.9 08/12/2024    EGFRNORACEVR >60.0 08/12/2024    EGFRNORACEVR >60.0 08/12/2024       Known diabetic complications: peripheral neuropathy    Weight trend:  Wt Readings from Last 8 Encounters:   10/03/24 71 kg (156 lb 8 oz)   08/29/24 74 kg (163 lb 2.3 oz)   05/16/24 71 kg (156 lb 8.4 oz)   04/24/24 70.1 kg (154 lb 6.9 oz)   04/17/24 70.9 kg (156 lb 4.9 oz)   04/08/24 77.6 kg (171 lb 1.2 oz)   03/05/24 72.6 kg (160 lb)   02/20/24 68 kg (150 lb)     Family history of diabetes:  Brother    Prior visit with diabetes education: No    Current diet: 3-4 meals per day  Current exercise: He is very active at work. He is a  at work     Blood glucose monitoring at home: Libre2  Home blood sugar records: Fasting 100; Prandial 200-300  Any episodes of hypoglycemia? Yes; he reports low glucose 3 times per month       Glucagon: Yes    Diabetes Management Status  Statin: Taking  ACE/ARB: Not taking    Screening or Prevention Patient's value   HgA1C Testing and Control   Lab Results   Component Value Date    HGBA1C 8.5 (H) 08/12/2024        LDL control Lab Results   Component Value Date    LDLCALC 73.6 08/12/2024      Nephropathy screening Lab Results   Component Value Date    MICALBCREAT Unable to calculate 10/19/2015        Lab Results   Component Value Date    TSH 2.770 09/26/2022       Last eye exam: Most  "Recent Eye Exam Date: Not Found      Review of Systems  As above    Social and family history reviewed  Current medications and allergies reviewed    Objective:   /70   Pulse 71   Resp 18   Ht 5' 3" (1.6 m)   Wt 71 kg (156 lb 8 oz)   SpO2 95%   BMI 27.72 kg/m²   Physical Exam  Alert, oriented    BP Readings from Last 1 Encounters:   10/03/24 114/70      Wt Readings from Last 1 Encounters:   10/03/24 0802 71 kg (156 lb 8 oz)     Body mass index is 27.72 kg/m².    Lab Review:   Lab Results   Component Value Date    HGBA1C 8.5 (H) 08/12/2024     Lab Results   Component Value Date    CHOL 136 08/12/2024    HDL 48 08/12/2024    LDLCALC 73.6 08/12/2024    TRIG 72 08/12/2024    CHOLHDL 35.3 08/12/2024     Lab Results   Component Value Date     08/12/2024     08/12/2024    K 5.0 08/12/2024    K 4.5 08/12/2024     08/12/2024     08/12/2024    CO2 27 08/12/2024    CO2 26 08/12/2024     (H) 08/12/2024     (H) 08/12/2024    BUN 19 08/12/2024    BUN 19 08/12/2024    CREATININE 0.9 08/12/2024    CREATININE 0.9 08/12/2024    CALCIUM 9.8 08/12/2024    CALCIUM 9.7 08/12/2024    PROT 7.1 08/12/2024    PROT 7.1 08/12/2024    ALBUMIN 3.8 08/12/2024    ALBUMIN 3.8 08/12/2024    BILITOT 0.7 08/12/2024    BILITOT 0.7 08/12/2024    ALKPHOS 83 08/12/2024    ALKPHOS 83 08/12/2024    AST 20 08/12/2024    AST 21 08/12/2024    ALT 27 08/12/2024    ALT 27 08/12/2024    ANIONGAP 6 (L) 08/12/2024    ANIONGAP 7 (L) 08/12/2024    ESTGFRAFRICA >60.0 07/29/2022    EGFRNONAA >60.0 07/29/2022    TSH 2.770 09/26/2022       All pertinent labs reviewed    Assessment and Plan     Type 2 diabetes mellitus without complication  Patient with uncontrolled T2DM with last A1c of 8.5%. Guido data reviewed with improvement in glucose compared to A1c. TIR >50%. He admits to forgetting to bolus before meals at times. Discussed with patient importance of bolusing before meals. He has lowered his basal insulin over time " "due to hypoglycemia in the early mornings. He reports being on Metformin and Glipizide in the past but was told it "didn't work for him". He was also told he wouldn't be a good candidate for GLP-1 RA due to low BMI. His current BMI is 27. I discussed with patient that his BMI is not too low to start GLP-1 RA and would help with prandial hyperglycemia. Denies hx of pancreatitis or Medullary thyroid cancer. He is hesitant to start GLP-1 RA for now. I discussed with patient starting SGLT-2 inhibitor and he agrees with plan. Will continue current insulin for now, but the goal would be to optimize non-insulin agents. Will obtain c-peptide now.     Could also consider starting Metformin in the future. No CKD. I also recommended he see a diabetes educator but he would like to hold off for now.     Plan   - Continue Lantus 26 U once daily   - Continue Novolog 5-6 U ACTID  - Start Jardiance 25 mg once daily   - Continue Libre2 ()    Follow up in 1-2 weeks    Mixed hyperlipidemia  Continue statin    Essential hypertension  He has been able to discontinue all BP medications. BP controlled in clinic. No recent UACR in chart. Will obtain today.     Iesha Freire PA-C  Endocrinology   "

## 2024-10-02 NOTE — ASSESSMENT & PLAN NOTE
"Patient with uncontrolled T2DM with last A1c of 8.5%. Guido data reviewed with improvement in glucose compared to A1c. TIR >50%. He admits to forgetting to bolus before meals at times. Discussed with patient importance of bolusing before meals. He has lowered his basal insulin over time due to hypoglycemia in the early mornings. He reports being on Metformin and Glipizide in the past but was told it "didn't work for him". He was also told he wouldn't be a good candidate for GLP-1 RA due to low BMI. His current BMI is 27. I discussed with patient that his BMI is not too low to start GLP-1 RA and would help with prandial hyperglycemia. Denies hx of pancreatitis or Medullary thyroid cancer. He is hesitant to start GLP-1 RA for now. I discussed with patient starting SGLT-2 inhibitor and he agrees with plan. Will continue current insulin for now, but the goal would be to optimize non-insulin agents. Will obtain c-peptide now.     Could also consider starting Metformin in the future. No CKD. I also recommended he see a diabetes educator but he would like to hold off for now.     Plan   - Continue Lantus 26 U once daily   - Continue Novolog 5-6 U ACTID  - Start Jardiance 25 mg once daily   - Continue Libre2 ()    Follow up in 1-2 weeks  "

## 2024-10-03 ENCOUNTER — LAB VISIT (OUTPATIENT)
Dept: LAB | Facility: HOSPITAL | Age: 59
End: 2024-10-03
Attending: PHYSICIAN ASSISTANT
Payer: MEDICAID

## 2024-10-03 ENCOUNTER — OFFICE VISIT (OUTPATIENT)
Dept: ENDOCRINOLOGY | Facility: CLINIC | Age: 59
End: 2024-10-03
Payer: MEDICAID

## 2024-10-03 VITALS
OXYGEN SATURATION: 95 % | RESPIRATION RATE: 18 BRPM | HEART RATE: 71 BPM | DIASTOLIC BLOOD PRESSURE: 70 MMHG | HEIGHT: 63 IN | BODY MASS INDEX: 27.73 KG/M2 | SYSTOLIC BLOOD PRESSURE: 114 MMHG | WEIGHT: 156.5 LBS

## 2024-10-03 DIAGNOSIS — E78.2 MIXED HYPERLIPIDEMIA: ICD-10-CM

## 2024-10-03 DIAGNOSIS — E11.9 TYPE 2 DIABETES MELLITUS WITHOUT COMPLICATION, UNSPECIFIED WHETHER LONG TERM INSULIN USE: Primary | ICD-10-CM

## 2024-10-03 DIAGNOSIS — I10 ESSENTIAL HYPERTENSION: ICD-10-CM

## 2024-10-03 DIAGNOSIS — E11.9 TYPE 2 DIABETES MELLITUS WITHOUT COMPLICATION, UNSPECIFIED WHETHER LONG TERM INSULIN USE: ICD-10-CM

## 2024-10-03 LAB
ALBUMIN SERPL BCP-MCNC: 3.9 G/DL (ref 3.5–5.2)
ALBUMIN/CREAT UR: 4.5 UG/MG (ref 0–30)
ALP SERPL-CCNC: 81 U/L (ref 55–135)
ALT SERPL W/O P-5'-P-CCNC: 29 U/L (ref 10–44)
ANION GAP SERPL CALC-SCNC: 8 MMOL/L (ref 8–16)
AST SERPL-CCNC: 22 U/L (ref 10–40)
BILIRUB SERPL-MCNC: 0.6 MG/DL (ref 0.1–1)
BUN SERPL-MCNC: 16 MG/DL (ref 6–20)
C PEPTIDE SERPL-MCNC: 0.14 NG/ML (ref 0.78–5.19)
CALCIUM SERPL-MCNC: 9.2 MG/DL (ref 8.7–10.5)
CHLORIDE SERPL-SCNC: 105 MMOL/L (ref 95–110)
CO2 SERPL-SCNC: 24 MMOL/L (ref 23–29)
CREAT SERPL-MCNC: 1.1 MG/DL (ref 0.5–1.4)
CREAT UR-MCNC: 67.4 MG/DL (ref 23–375)
EST. GFR  (NO RACE VARIABLE): >60 ML/MIN/1.73 M^2
ESTIMATED AVG GLUCOSE: 183 MG/DL (ref 68–131)
GLUCOSE SERPL-MCNC: 189 MG/DL (ref 70–110)
HBA1C MFR BLD: 8 % (ref 4–5.6)
MICROALBUMIN UR DL<=1MG/L-MCNC: 3 UG/ML
POTASSIUM SERPL-SCNC: 4.7 MMOL/L (ref 3.5–5.1)
PROT SERPL-MCNC: 7.1 G/DL (ref 6–8.4)
SODIUM SERPL-SCNC: 137 MMOL/L (ref 136–145)
TSH SERPL DL<=0.005 MIU/L-ACNC: 1.18 UIU/ML (ref 0.4–4)

## 2024-10-03 PROCEDURE — 80053 COMPREHEN METABOLIC PANEL: CPT | Performed by: PHYSICIAN ASSISTANT

## 2024-10-03 PROCEDURE — 99999 PR PBB SHADOW E&M-EST. PATIENT-LVL III: CPT | Mod: PBBFAC,,, | Performed by: PHYSICIAN ASSISTANT

## 2024-10-03 PROCEDURE — 99213 OFFICE O/P EST LOW 20 MIN: CPT | Mod: PBBFAC | Performed by: PHYSICIAN ASSISTANT

## 2024-10-03 PROCEDURE — 82570 ASSAY OF URINE CREATININE: CPT | Performed by: PHYSICIAN ASSISTANT

## 2024-10-03 PROCEDURE — 36415 COLL VENOUS BLD VENIPUNCTURE: CPT | Performed by: PHYSICIAN ASSISTANT

## 2024-10-03 PROCEDURE — 83036 HEMOGLOBIN GLYCOSYLATED A1C: CPT | Performed by: PHYSICIAN ASSISTANT

## 2024-10-03 PROCEDURE — 84681 ASSAY OF C-PEPTIDE: CPT | Performed by: PHYSICIAN ASSISTANT

## 2024-10-03 PROCEDURE — 84443 ASSAY THYROID STIM HORMONE: CPT | Performed by: PHYSICIAN ASSISTANT

## 2024-10-07 ENCOUNTER — TELEPHONE (OUTPATIENT)
Dept: ENDOCRINOLOGY | Facility: CLINIC | Age: 59
End: 2024-10-07
Payer: MEDICAID

## 2024-10-07 NOTE — TELEPHONE ENCOUNTER
I spoke with patient. He states he got scared when his glucose hit 120 before bed.He states he ate a big plate of rice to keep his sugar in normal range at night. He did not take his Jardiance today due to concern over possible hypoglycemia. He has not had any glucose readings < 80 since starting Jardiance. He had one reading of 85 in the early morning time 2 nights ago. He wanted to lower his Lantus to 15 units. I discussed with patient that this would be too low. I discussed with him making the following changes:   Decrease Lantus to 22 units once daily   Decrease Novolog to 5 U ACTID   Continue Jardiance 25 mg once daily

## 2024-10-11 ENCOUNTER — OFFICE VISIT (OUTPATIENT)
Dept: UROLOGY | Facility: CLINIC | Age: 59
End: 2024-10-11
Attending: SPECIALIST
Payer: MEDICAID

## 2024-10-11 VITALS
OXYGEN SATURATION: 95 % | HEIGHT: 63 IN | HEART RATE: 83 BPM | SYSTOLIC BLOOD PRESSURE: 98 MMHG | DIASTOLIC BLOOD PRESSURE: 62 MMHG | WEIGHT: 156.88 LBS | BODY MASS INDEX: 27.8 KG/M2

## 2024-10-11 DIAGNOSIS — N40.0 BENIGN PROSTATIC HYPERPLASIA, UNSPECIFIED WHETHER LOWER URINARY TRACT SYMPTOMS PRESENT: Primary | ICD-10-CM

## 2024-10-11 PROCEDURE — 99999 PR PBB SHADOW E&M-EST. PATIENT-LVL III: CPT | Mod: PBBFAC,,, | Performed by: SPECIALIST

## 2024-10-11 PROCEDURE — 99213 OFFICE O/P EST LOW 20 MIN: CPT | Mod: PBBFAC | Performed by: SPECIALIST

## 2024-10-11 RX ORDER — TAMSULOSIN HYDROCHLORIDE 0.4 MG/1
0.4 CAPSULE ORAL 2 TIMES DAILY
Qty: 60 CAPSULE | Refills: 11 | Status: SHIPPED | OUTPATIENT
Start: 2024-10-11 | End: 2025-10-11

## 2024-10-11 NOTE — PROGRESS NOTES
"[facility] - Urology   Clinic Note    SUBJECTIVE:     Chief Complaint   Patient presents with    Follow-up     6 week follow up, states he's been doing okay since last visit.        Referral from: No ref. provider found.    History of Present Illness:  Ross Crystal is a 59 y.o. male who presents to clinic for BPH.    Patient mentions he needs a new prescription in order to try 0.8 mg of Flomax.  He is taking Proscar as well.    Past Medical History:   Diagnosis Date    Benign paroxysmal vertigo, bilateral     Diabetes mellitus     Diabetes mellitus type I     Hyperlipemia     Renal disorder     Urinary tract infection        Current Outpatient Medications on File Prior to Visit   Medication Sig Dispense Refill    amoxicillin-clavulanate 875-125mg (AUGMENTIN) 875-125 mg per tablet Take 1 tablet by mouth 2 (two) times a day. 20 tablet 1    aspirin (ECOTRIN) 81 MG EC tablet Take 81 mg by mouth once daily.      atorvastatin (LIPITOR) 80 MG tablet Take 80 mg by mouth.      BD ULTRA-FINE JENNY PEN NEEDLE 32 gauge x 5/32" Ndle 4 (four) times daily.      empagliflozin (JARDIANCE) 25 mg tablet Take 1 tablet (25 mg total) by mouth once daily. 30 tablet 11    finasteride (PROSCAR) 5 mg tablet Take 1 tablet (5 mg total) by mouth once daily. 30 tablet 11    fish oil-omega-3 fatty acids 300-1,000 mg capsule Take 1 capsule by mouth 2 (two) times a day.      flash glucose scanning reader (FREESTYLE JULIO 2 READER) Misc Use as instructed for blood glucose monitoring.      flash glucose sensor (FREESTYLE JULIO 14 DAY SENSOR) Kit Inject 1 each into the skin.      FREESTYLE JULIO 2 SENSOR Kit CHANGE EVERY 14 DAYS FOR BLOOD GLUCOSE MONITORING      HYDROcodone-acetaminophen (NORCO) 5-325 mg per tablet Take 1 tablet by mouth every 4 (four) hours as needed for Pain. 20 tablet 0    hydrocodone-acetaminophen 10-325mg (NORCO)  mg Tab Take 1 tablet by mouth 2 (two) times a day.      insulin glargine 100 units/mL SubQ pen " "Inject 45 Units into the skin.      insulin syringe-needle U-100 1 mL 31 gauge x 5/16 Syrg       LEVEMIR FLEXTOUCH U-100 INSULN 100 unit/mL (3 mL) InPn pen Inject 35 Units into the skin 2 (two) times daily.      meclizine (ANTIVERT) 50 MG tablet Take 50 mg by mouth 2 (two) times daily.      meloxicam (MOBIC) 15 MG tablet Take 15 mg by mouth.      minocycline (MINOCIN,DYNACIN) 100 MG capsule Take 1 capsule (100 mg total) by mouth 2 (two) times a day. 28 capsule 1    naproxen (NAPROSYN) 500 MG tablet Take 1 tablet (500 mg total) by mouth 2 (two) times daily with meals. 20 tablet 0    nicotine (NICODERM CQ) 21 mg/24 hr Place 1 patch onto the skin once daily. 28 patch 0    NOVOLOG FLEXPEN U-100 INSULIN 100 unit/mL (3 mL) InPn pen SMARTSI-12 Unit(s) SUB-Q As Directed      pravastatin (PRAVACHOL) 40 MG tablet Take 40 mg by mouth every evening.      tamsulosin (FLOMAX) 0.4 mg Cap Take 0.4 mg by mouth once daily.      TRUE METRIX GLUCOSE TEST STRIP Strp 1 strip 4 (four) times daily.      TRUEPLUS LANCETS 33 gauge Misc USE TO TEST BLOOD SUGAR TWO TIMES DAILY      [DISCONTINUED] tamsulosin (FLOMAX) 0.4 mg Cap Take 1 capsule (0.4 mg total) by mouth 2 (two) times a day. 60 capsule 11    BAQSIMI 3 mg/actuation [place] 3 mg by Nasal route. (Patient not taking: Reported on 2024)      DEXCOM G6  Misc  (Patient not taking: Reported on 10/11/2024)      DEXCOM G6 SENSOR Sadaf SMARTSI Each Topical Every 10 Days (Patient not taking: Reported on 10/11/2024)      DEXCOM G6 TRANSMITTER Sadaf  (Patient not taking: Reported on 10/11/2024)       No current facility-administered medications on file prior to visit.         OBJECTIVE:     Estimated body mass index is 27.79 kg/m² as calculated from the following:    Height as of this encounter: 5' 3" (1.6 m).    Weight as of this encounter: 71.2 kg (156 lb 13.7 oz).    Vital Signs (Most Recent)  Vitals:    10/11/24 0858   BP: 98/62   Pulse: 83       Physical Exam:    Physical Exam " "    GENERAL: patient sitting comfortably  HEENT: normocephalic  NECK: supple, no JVD  PULM: normal chest rise, no increased WOB  HEART: non-diaphoretic  ABDO: soft, nondistended, nontender  BACK: no CVA tenderness bilaterally  SKIN: warm, dry, well perfused  EXT: no bruising or edema  NEURO: grossly normal with no focal deficits  PSYCH: appropriate mood and affect    Genitourinary Exam:  deferred      LABS:     Lab Results   Component Value Date    BUN 16 10/03/2024    CREATININE 1.1 10/03/2024    WBC 11.20 08/12/2024    HGB 16.2 08/12/2024    HCT 48.7 08/12/2024     08/12/2024    AST 22 10/03/2024    ALT 29 10/03/2024    ALKPHOS 81 10/03/2024    ALBUMIN 3.9 10/03/2024    HGBA1C 8.0 (H) 10/03/2024    INR 1.0 02/22/2018        Urinalysis:   No results found for: "UAREFLEX"     PSA:  Lab Results   Component Value Date    PSA 0.94 10/19/2015    PSADIAG 0.78 09/16/2014       Testosterone:  No results found for: "TOTALTESTOST", "TESTOSTERONE"     Imaging:  I have personally reviewed all relevant imaging studies.    No results found for this or any previous visit (from the past 2160 hours).  No results found for this or any previous visit (from the past 2160 hours).  X-Ray Foot Complete Left  Narrative: EXAMINATION:  XR FOOT COMPLETE 3 VIEW LEFT    CLINICAL HISTORY:  .  Disorder of the skin and subcutaneous tissue, unspecified    TECHNIQUE:  Weightbearing AP, lateral and oblique views of the left foot were performed.    COMPARISON:  08/12/2019.    FINDINGS:  The bones are intact.  There is no evidence for acute fracture or bone destruction.  There is no evidence for dislocation.  There are mild degenerative changes at the left 1st metatarsophalangeal joint.  No bony erosions are identified.  There is a small calcaneal spur at the insertion of the plantar fascia.  Soft tissues are unremarkable.  Impression: No evidence for acute fracture, bone destruction, or dislocation.    Mild degenerative changes of the left 1st " MTP joint.    Small calcaneal spur at the insertion of the plantar fascia.    Electronically signed by: Khai gA MD  Date:    04/25/2024  Time:    09:41         ASSESSMENT     1. Benign prostatic hyperplasia, unspecified whether lower urinary tract symptoms present        PLAN:     Increase tamsulosin to 0.8 mg q.h.s.  Continue finasteride  Check PSA  Follow up 3 months    Misha Armstrong MD  Urology  [facility]     Disclaimer: This note has been generated using voice-recognition software. There may be typographical errors that have been missed during proof-reading.

## 2024-10-14 NOTE — ASSESSMENT & PLAN NOTE
Glucose control improved with recent A1c of 8.0% and TIR >60%. He admits to forgetting to bolus before meals at times. I again stressed importance of bolusing before meals. Since he has started Jardiance, he has been able to lower his insulin. He is now on 10 units of basal and 3-4 units of bolus.     His c-peptide was low, so will obtain CAD and islet cell antibodies. He is tolerating Jardiance.     Could also consider starting Metformin in the future. No CKD.     Plan   - Continue Lantus 10 U once daily   - Continue Novolog 3-4 U ACTID  - Continue Jardiance 25 mg once daily   - Continue Libre2 ()    Follow up 1 month

## 2024-10-14 NOTE — PROGRESS NOTES
Subjective:      Patient ID: Ross Crystal is a 59 y.o. male.    Chief Complaint:  T2DM    History of Present Illness  This is a 59 y.o. male. with a past medical history of T2DM, HTN, HLD, Neuropathy here for evaluation of T2DM.    Type 2 diabetes mellitus  Diagnosed around age 47    Current diabetes medications:  - Lantus 10 units once daily in AM   - Novolog 3-4 units ACTID  - Jardiance 25 mg once daily     Past diabetes medications:  - Metformin - failure   - Glipizide- failure   - Farxiga intolerance, but he does not remember what (Happened 10 years ago)    Lab Results   Component Value Date    CREATININE 1.1 10/03/2024    EGFRNORACEVR >60 10/03/2024       Known diabetic complications: peripheral neuropathy    Weight trend:  Wt Readings from Last 8 Encounters:   10/15/24 69.7 kg (153 lb 11.2 oz)   10/11/24 71.2 kg (156 lb 13.7 oz)   10/03/24 71 kg (156 lb 8 oz)   08/29/24 74 kg (163 lb 2.3 oz)   05/16/24 71 kg (156 lb 8.4 oz)   04/24/24 70.1 kg (154 lb 6.9 oz)   04/17/24 70.9 kg (156 lb 4.9 oz)   04/08/24 77.6 kg (171 lb 1.2 oz)     Family history of diabetes:  Brother    Prior visit with diabetes education: No    Current diet: 3-4 meals per day  Current exercise: He is very active at work. He is a  at work     Blood glucose monitoring at home: Libre2 ()         Glucagon: Yes    Diabetes Management Status  Statin: Taking  ACE/ARB: Not taking    Screening or Prevention Patient's value   HgA1C Testing and Control   Lab Results   Component Value Date    HGBA1C 8.0 (H) 10/03/2024        LDL control Lab Results   Component Value Date    LDLCALC 73.6 08/12/2024      Nephropathy screening Lab Results   Component Value Date    MICALBCREAT 4.5 10/03/2024        Lab Results   Component Value Date    TSH 1.178 10/03/2024       Last eye exam: Most Recent Eye Exam Date: Not Found      Review of Systems  As above    Social and family history reviewed  Current medications and allergies  "reviewed    Objective:   /72   Pulse 90   Resp 18   Ht 5' 3" (1.6 m)   Wt 69.7 kg (153 lb 11.2 oz)   SpO2 96%   BMI 27.23 kg/m²   Physical Exam  Alert, oriented    BP Readings from Last 1 Encounters:   10/15/24 112/72      Wt Readings from Last 1 Encounters:   10/15/24 0755 69.7 kg (153 lb 11.2 oz)     Body mass index is 27.23 kg/m².    Lab Review:   Lab Results   Component Value Date    HGBA1C 8.0 (H) 10/03/2024     Lab Results   Component Value Date    CHOL 136 08/12/2024    HDL 48 08/12/2024    LDLCALC 73.6 08/12/2024    TRIG 72 08/12/2024    CHOLHDL 35.3 08/12/2024     Lab Results   Component Value Date     10/03/2024    K 4.7 10/03/2024     10/03/2024    CO2 24 10/03/2024     (H) 10/03/2024    BUN 16 10/03/2024    CREATININE 1.1 10/03/2024    CALCIUM 9.2 10/03/2024    PROT 7.1 10/03/2024    ALBUMIN 3.9 10/03/2024    BILITOT 0.6 10/03/2024    ALKPHOS 81 10/03/2024    AST 22 10/03/2024    ALT 29 10/03/2024    ANIONGAP 8 10/03/2024    ESTGFRAFRICA >60.0 07/29/2022    EGFRNONAA >60.0 07/29/2022    TSH 1.178 10/03/2024       All pertinent labs reviewed    Assessment and Plan     Type 2 diabetes mellitus without complication  Glucose control improved with recent A1c of 8.0% and TIR >60%. He admits to forgetting to bolus before meals at times. I again stressed importance of bolusing before meals. Since he has started Jardiance, he has been able to lower his insulin. He is now on 10 units of basal and 3-4 units of bolus.     His c-peptide was low, so will obtain CAD and islet cell antibodies. He is tolerating Jardiance.     Could also consider starting Metformin in the future. No CKD.     Plan   - Continue Lantus 10 U once daily   - Continue Novolog 3-4 U ACTID  - Continue Jardiance 25 mg once daily   - Continue Libre2 ()    Follow up 1 month     Mixed hyperlipidemia  Continue statin    Essential hypertension  He has been able to discontinue all BP medications. BP controlled in " clinic. Normal UACR. Can stay off ACEi/ARB for now.     Iesha Freire PA-C  Endocrinology

## 2024-10-14 NOTE — ASSESSMENT & PLAN NOTE
He has been able to discontinue all BP medications. BP controlled in clinic. Normal UACR. Can stay off ACEi/ARB for now.

## 2024-10-15 ENCOUNTER — OFFICE VISIT (OUTPATIENT)
Dept: ENDOCRINOLOGY | Facility: CLINIC | Age: 59
End: 2024-10-15
Payer: MEDICAID

## 2024-10-15 VITALS
SYSTOLIC BLOOD PRESSURE: 112 MMHG | RESPIRATION RATE: 18 BRPM | HEIGHT: 63 IN | WEIGHT: 153.69 LBS | DIASTOLIC BLOOD PRESSURE: 72 MMHG | OXYGEN SATURATION: 96 % | HEART RATE: 90 BPM | BODY MASS INDEX: 27.23 KG/M2

## 2024-10-15 DIAGNOSIS — E78.2 MIXED HYPERLIPIDEMIA: ICD-10-CM

## 2024-10-15 DIAGNOSIS — E11.9 TYPE 2 DIABETES MELLITUS WITHOUT COMPLICATION, UNSPECIFIED WHETHER LONG TERM INSULIN USE: Primary | ICD-10-CM

## 2024-10-15 DIAGNOSIS — I10 ESSENTIAL HYPERTENSION: ICD-10-CM

## 2024-10-15 PROCEDURE — 99214 OFFICE O/P EST MOD 30 MIN: CPT | Mod: S$PBB,,, | Performed by: PHYSICIAN ASSISTANT

## 2024-10-15 PROCEDURE — 3074F SYST BP LT 130 MM HG: CPT | Mod: CPTII,,, | Performed by: PHYSICIAN ASSISTANT

## 2024-10-15 PROCEDURE — 3066F NEPHROPATHY DOC TX: CPT | Mod: CPTII,,, | Performed by: PHYSICIAN ASSISTANT

## 2024-10-15 PROCEDURE — 95251 CONT GLUC MNTR ANALYSIS I&R: CPT | Mod: ,,, | Performed by: PHYSICIAN ASSISTANT

## 2024-10-15 PROCEDURE — 3061F NEG MICROALBUMINURIA REV: CPT | Mod: CPTII,,, | Performed by: PHYSICIAN ASSISTANT

## 2024-10-15 PROCEDURE — 1159F MED LIST DOCD IN RCRD: CPT | Mod: CPTII,,, | Performed by: PHYSICIAN ASSISTANT

## 2024-10-15 PROCEDURE — 99999 PR PBB SHADOW E&M-EST. PATIENT-LVL V: CPT | Mod: PBBFAC,,, | Performed by: PHYSICIAN ASSISTANT

## 2024-10-15 PROCEDURE — 99215 OFFICE O/P EST HI 40 MIN: CPT | Mod: PBBFAC | Performed by: PHYSICIAN ASSISTANT

## 2024-10-15 PROCEDURE — 3078F DIAST BP <80 MM HG: CPT | Mod: CPTII,,, | Performed by: PHYSICIAN ASSISTANT

## 2024-10-15 PROCEDURE — 3008F BODY MASS INDEX DOCD: CPT | Mod: CPTII,,, | Performed by: PHYSICIAN ASSISTANT

## 2024-10-15 PROCEDURE — 3052F HG A1C>EQUAL 8.0%<EQUAL 9.0%: CPT | Mod: CPTII,,, | Performed by: PHYSICIAN ASSISTANT

## 2024-10-15 NOTE — PATIENT INSTRUCTIONS
Continue Lantus 10 u once daily   Continue Novolog 3-4 units BEFORE MEALS   Continue Jardiance 25 mg once daily

## 2024-10-18 ENCOUNTER — TELEPHONE (OUTPATIENT)
Dept: PODIATRY | Facility: CLINIC | Age: 59
End: 2024-10-18
Payer: MEDICAID

## 2024-10-18 NOTE — TELEPHONE ENCOUNTER
Called patient back to schedule a appt with Dr. Tiwari in regards to message pasted below. There was no answer and no voicemail box set up.        Caller is requesting a sooner appointment.  Caller declined first available appointment listed below.  Caller will not accept being placed on the waitlist and is requesting a message be sent to doctor.   Name of Caller: Ross Crystal   When is the first available appointment? 1.21.24   Symptoms: Foot care   Would the patient rather a call back or a response via [facility]?  call   Best Call Back Number: [phone]   Additional Information:  Pt is requesting to be scheduled on 11.27.24 if possible.

## 2024-11-18 NOTE — PROGRESS NOTES
Subjective:      Patient ID: Ross Crystal is a 59 y.o. male.    Chief Complaint:  T2DM    History of Present Illness  This is a 59 y.o. male. with a past medical history of T2DM, HTN, HLD, Neuropathy here for evaluation of T2DM.    Type 2 diabetes mellitus  Diagnosed around age 47    Current diabetes medications:  - Lantus 12 units once daily in AM   - Novolog 4-20 units ACTID  - Jardiance 25 mg once daily     Past diabetes medications:  - Metformin - failure   - Glipizide- failure   - Farxiga intolerance, but he does not remember what (Happened 10 years ago)    Lab Results   Component Value Date    CREATININE 1.1 10/03/2024    EGFRNORACEVR >60 10/03/2024       Known diabetic complications: peripheral neuropathy    Weight trend:  Wt Readings from Last 8 Encounters:   11/19/24 67.3 kg (148 lb 4.8 oz)   10/15/24 69.7 kg (153 lb 11.2 oz)   10/11/24 71.2 kg (156 lb 13.7 oz)   10/03/24 71 kg (156 lb 8 oz)   08/29/24 74 kg (163 lb 2.3 oz)   05/16/24 71 kg (156 lb 8.4 oz)   04/24/24 70.1 kg (154 lb 6.9 oz)   04/17/24 70.9 kg (156 lb 4.9 oz)     Family history of diabetes:  Brother    Prior visit with diabetes education: No    Current diet: 3-4 meals per day  Current exercise: He is very active at work. He is a  at work     Blood glucose monitoring at home: Libre2 ()         Glucagon: Yes    Diabetes Management Status  Statin: Taking  ACE/ARB: Not taking    Screening or Prevention Patient's value   HgA1C Testing and Control   Lab Results   Component Value Date    HGBA1C 8.0 (H) 10/03/2024        LDL control Lab Results   Component Value Date    LDLCALC 73.6 08/12/2024      Nephropathy screening Lab Results   Component Value Date    MICALBCREAT 4.5 10/03/2024        Lab Results   Component Value Date    TSH 1.178 10/03/2024       Last eye exam: Most Recent Eye Exam Date: Not Found      Review of Systems  As above    Social and family history reviewed  Current medications and allergies  "reviewed    Objective:   /70   Pulse 76   Resp 18   Ht 5' 3" (1.6 m)   Wt 67.3 kg (148 lb 4.8 oz)   SpO2 96%   BMI 26.27 kg/m²   Physical Exam  Alert, oriented    BP Readings from Last 1 Encounters:   11/19/24 106/70      Wt Readings from Last 1 Encounters:   11/19/24 0754 67.3 kg (148 lb 4.8 oz)     Body mass index is 26.27 kg/m².    Lab Review:   Lab Results   Component Value Date    HGBA1C 8.0 (H) 10/03/2024     Lab Results   Component Value Date    CHOL 136 08/12/2024    HDL 48 08/12/2024    LDLCALC 73.6 08/12/2024    TRIG 72 08/12/2024    CHOLHDL 35.3 08/12/2024     Lab Results   Component Value Date     10/03/2024    K 4.7 10/03/2024     10/03/2024    CO2 24 10/03/2024     (H) 10/03/2024    BUN 16 10/03/2024    CREATININE 1.1 10/03/2024    CALCIUM 9.2 10/03/2024    PROT 7.1 10/03/2024    ALBUMIN 3.9 10/03/2024    BILITOT 0.6 10/03/2024    ALKPHOS 81 10/03/2024    AST 22 10/03/2024    ALT 29 10/03/2024    ANIONGAP 8 10/03/2024    ESTGFRAFRICA >60.0 07/29/2022    EGFRNONAA >60.0 07/29/2022    TSH 1.178 10/03/2024       All pertinent labs reviewed    Assessment and Plan     Type 2 diabetes mellitus without complication  Glucose control improved with TIR 71%. No hypoglycemia. He admits to forgetting to bolus before meals at times. I again stressed importance of bolusing before meals. Overall his glucose has significantly improved. He is still very concerned about glucose in low 100s and 90s and often eats to raise his glucose when it hits this range. I explained to patient that glucose in that range is normal and at goal. He understands this and will try to avoid over-correcting.     He has been giving more short acting insulin recently due to eating carb rich foods such as pasta, cereal, rice, and bread. I encouraged a diet low in carbs and high in protein. His main issue is during breakfast when he eats bread. He will try to cut down.     His c-peptide was low. +ANTHONY, but negative " islet cell antibodies. He is tolerating Jardiance. Could also consider starting Metformin in the future. No CKD.     Plan   - Continue Lantus 12 U once daily   - Continue Novolog 4-20 U ACTID  - Continue Jardiance 25 mg once daily   - Continue Libre2 ()    Follow up 1 month     Mixed hyperlipidemia  Continue statin    Essential hypertension  He has been able to discontinue all BP medications. BP controlled in clinic. Normal UACR. Can stay off ACEi/ARB for now.     Iesha Freire PA-C  Endocrinology

## 2024-11-18 NOTE — ASSESSMENT & PLAN NOTE
Glucose control improved with TIR 71%. No hypoglycemia. He admits to forgetting to bolus before meals at times. I again stressed importance of bolusing before meals. Overall his glucose has significantly improved. He is still very concerned about glucose in low 100s and 90s and often eats to raise his glucose when it hits this range. I explained to patient that glucose in that range is normal and at goal. He understands this and will try to avoid over-correcting.     He has been giving more short acting insulin recently due to eating carb rich foods such as pasta, cereal, rice, and bread. I encouraged a diet low in carbs and high in protein. His main issue is during breakfast when he eats bread. He will try to cut down.     His c-peptide was low. +ANTHONY, but negative islet cell antibodies. He is tolerating Jardiance. Could also consider starting Metformin in the future. No CKD.     Plan   - Continue Lantus 12 U once daily   - Continue Novolog 4-20 U ACTID  - Continue Jardiance 25 mg once daily   - Continue Libre2 ()    Follow up 1 month

## 2024-11-19 ENCOUNTER — OFFICE VISIT (OUTPATIENT)
Dept: ENDOCRINOLOGY | Facility: CLINIC | Age: 59
End: 2024-11-19
Payer: MEDICAID

## 2024-11-19 VITALS
SYSTOLIC BLOOD PRESSURE: 106 MMHG | RESPIRATION RATE: 18 BRPM | OXYGEN SATURATION: 96 % | WEIGHT: 148.31 LBS | HEART RATE: 76 BPM | BODY MASS INDEX: 26.28 KG/M2 | HEIGHT: 63 IN | DIASTOLIC BLOOD PRESSURE: 70 MMHG

## 2024-11-19 DIAGNOSIS — E11.9 TYPE 2 DIABETES MELLITUS WITHOUT COMPLICATION, UNSPECIFIED WHETHER LONG TERM INSULIN USE: Primary | ICD-10-CM

## 2024-11-19 DIAGNOSIS — E78.2 MIXED HYPERLIPIDEMIA: ICD-10-CM

## 2024-11-19 DIAGNOSIS — I10 ESSENTIAL HYPERTENSION: ICD-10-CM

## 2024-11-19 PROCEDURE — 3066F NEPHROPATHY DOC TX: CPT | Mod: CPTII,,, | Performed by: PHYSICIAN ASSISTANT

## 2024-11-19 PROCEDURE — 3052F HG A1C>EQUAL 8.0%<EQUAL 9.0%: CPT | Mod: CPTII,,, | Performed by: PHYSICIAN ASSISTANT

## 2024-11-19 PROCEDURE — 3061F NEG MICROALBUMINURIA REV: CPT | Mod: CPTII,,, | Performed by: PHYSICIAN ASSISTANT

## 2024-11-19 PROCEDURE — 3078F DIAST BP <80 MM HG: CPT | Mod: CPTII,,, | Performed by: PHYSICIAN ASSISTANT

## 2024-11-19 PROCEDURE — 1159F MED LIST DOCD IN RCRD: CPT | Mod: CPTII,,, | Performed by: PHYSICIAN ASSISTANT

## 2024-11-19 PROCEDURE — 99999 PR PBB SHADOW E&M-EST. PATIENT-LVL V: CPT | Mod: PBBFAC,,, | Performed by: PHYSICIAN ASSISTANT

## 2024-11-19 PROCEDURE — 3074F SYST BP LT 130 MM HG: CPT | Mod: CPTII,,, | Performed by: PHYSICIAN ASSISTANT

## 2024-11-19 PROCEDURE — 95251 CONT GLUC MNTR ANALYSIS I&R: CPT | Mod: ,,, | Performed by: PHYSICIAN ASSISTANT

## 2024-11-19 PROCEDURE — 99214 OFFICE O/P EST MOD 30 MIN: CPT | Mod: 25,S$PBB,, | Performed by: PHYSICIAN ASSISTANT

## 2024-11-19 PROCEDURE — 99215 OFFICE O/P EST HI 40 MIN: CPT | Mod: PBBFAC | Performed by: PHYSICIAN ASSISTANT

## 2024-11-19 PROCEDURE — 3008F BODY MASS INDEX DOCD: CPT | Mod: CPTII,,, | Performed by: PHYSICIAN ASSISTANT

## 2024-11-19 RX ORDER — FLASH GLUCOSE SENSOR
KIT MISCELLANEOUS
Qty: 2 KIT | Refills: 11 | Status: CANCELLED | OUTPATIENT
Start: 2024-11-19

## 2024-12-04 ENCOUNTER — TELEPHONE (OUTPATIENT)
Dept: SMOKING CESSATION | Facility: CLINIC | Age: 59
End: 2024-12-04
Payer: MEDICAID

## 2024-12-04 NOTE — TELEPHONE ENCOUNTER
1st attempt, regarding smoking cessation 6 month follow up for quit 1 episode. Voicemail box not set up. Unable to leave a message.

## 2024-12-19 ENCOUNTER — TELEPHONE (OUTPATIENT)
Dept: ENDOCRINOLOGY | Facility: CLINIC | Age: 59
End: 2024-12-19
Payer: MEDICAID

## 2024-12-19 NOTE — TELEPHONE ENCOUNTER
----- Message from Rachel sent at 2024 12:42 PM CST -----  Contact: Patient  Ross Crystal  MRN: [id number]  : 1965  PCP: Ross Valverde  Home Phone      [phone]  Work Phone      Not on file.  Mobile          [phone]      MESSAGE: Patient is needing a new Dexcom monitor. Please return this call.     PHONE; [phone]

## 2024-12-31 ENCOUNTER — OFFICE VISIT (OUTPATIENT)
Dept: ENDOCRINOLOGY | Facility: CLINIC | Age: 59
End: 2024-12-31
Payer: MEDICAID

## 2024-12-31 VITALS
SYSTOLIC BLOOD PRESSURE: 122 MMHG | OXYGEN SATURATION: 98 % | RESPIRATION RATE: 16 BRPM | DIASTOLIC BLOOD PRESSURE: 69 MMHG | BODY MASS INDEX: 26.25 KG/M2 | WEIGHT: 148.13 LBS | HEART RATE: 89 BPM | HEIGHT: 63 IN

## 2024-12-31 DIAGNOSIS — I10 ESSENTIAL HYPERTENSION: ICD-10-CM

## 2024-12-31 DIAGNOSIS — E78.2 MIXED HYPERLIPIDEMIA: ICD-10-CM

## 2024-12-31 DIAGNOSIS — E11.9 TYPE 2 DIABETES MELLITUS WITHOUT COMPLICATION, UNSPECIFIED WHETHER LONG TERM INSULIN USE: Primary | ICD-10-CM

## 2024-12-31 PROCEDURE — 3052F HG A1C>EQUAL 8.0%<EQUAL 9.0%: CPT | Mod: CPTII,,, | Performed by: PHYSICIAN ASSISTANT

## 2024-12-31 PROCEDURE — 95251 CONT GLUC MNTR ANALYSIS I&R: CPT | Mod: ,,, | Performed by: PHYSICIAN ASSISTANT

## 2024-12-31 PROCEDURE — 3074F SYST BP LT 130 MM HG: CPT | Mod: CPTII,,, | Performed by: PHYSICIAN ASSISTANT

## 2024-12-31 PROCEDURE — 3078F DIAST BP <80 MM HG: CPT | Mod: CPTII,,, | Performed by: PHYSICIAN ASSISTANT

## 2024-12-31 PROCEDURE — 99999 PR PBB SHADOW E&M-EST. PATIENT-LVL III: CPT | Mod: PBBFAC,,, | Performed by: PHYSICIAN ASSISTANT

## 2024-12-31 PROCEDURE — 3066F NEPHROPATHY DOC TX: CPT | Mod: CPTII,,, | Performed by: PHYSICIAN ASSISTANT

## 2024-12-31 PROCEDURE — 99214 OFFICE O/P EST MOD 30 MIN: CPT | Mod: S$PBB,,, | Performed by: PHYSICIAN ASSISTANT

## 2024-12-31 PROCEDURE — 3061F NEG MICROALBUMINURIA REV: CPT | Mod: CPTII,,, | Performed by: PHYSICIAN ASSISTANT

## 2024-12-31 PROCEDURE — 99213 OFFICE O/P EST LOW 20 MIN: CPT | Mod: PBBFAC | Performed by: PHYSICIAN ASSISTANT

## 2024-12-31 PROCEDURE — 3008F BODY MASS INDEX DOCD: CPT | Mod: CPTII,,, | Performed by: PHYSICIAN ASSISTANT

## 2024-12-31 RX ORDER — BLOOD-GLUCOSE,RECEIVER,CONT
1 EACH MISCELLANEOUS ONCE
Qty: 1 EACH | Refills: 0 | Status: SHIPPED | OUTPATIENT
Start: 2024-12-31 | End: 2024-12-31

## 2024-12-31 RX ORDER — BLOOD-GLUCOSE SENSOR
EACH MISCELLANEOUS
Qty: 2 EACH | Refills: 11 | Status: SHIPPED | OUTPATIENT
Start: 2024-12-31

## 2024-12-31 RX ORDER — INSULIN ASPART 100 [IU]/ML
10 INJECTION, SOLUTION INTRAVENOUS; SUBCUTANEOUS
Qty: 9 ML | Refills: 11 | Status: SHIPPED | OUTPATIENT
Start: 2024-12-31

## 2024-12-31 RX ORDER — INSULIN GLARGINE 100 [IU]/ML
20 INJECTION, SOLUTION SUBCUTANEOUS DAILY
Qty: 6 ML | Refills: 11 | Status: SHIPPED | OUTPATIENT
Start: 2024-12-31

## 2024-12-31 NOTE — ASSESSMENT & PLAN NOTE
CGM data reviewed with TIR of 64%. No hypoglycemia. He admits to forgetting to bolus before meals at times. I again stressed importance of bolusing before meals. He reports unhealthy eating around the holidays. Will improve diet.     His c-peptide was low. +ANTHONY, but negative islet cell antibodies. He is tolerating Jardiance. Could also consider starting Metformin in the future. No CKD.     Plan   - Continue Lantus 14 U once daily   - Continue Novolog 6 U ACTID  - Continue Jardiance 25 mg once daily   - Continue Libre2 ()    Follow up 6 weeks

## 2024-12-31 NOTE — PROGRESS NOTES
Subjective:      Patient ID: Ross Crystal is a 59 y.o. male.    Chief Complaint:  T2DM    History of Present Illness  This is a 59 y.o. male. with a past medical history of T2DM, HTN, HLD, Neuropathy here for evaluation of T2DM.    Type 2 diabetes mellitus  Diagnosed around age 47    Current diabetes medications:  - Lantus 14 units once daily in AM   - Novolog 6 units ACTID  - Jardiance 25 mg once daily     - Freestyle Libre2 ()    Past diabetes medications:  - Metformin - failure   - Glipizide- failure   - Farxiga intolerance, but he does not remember what (Happened 10 years ago)    Lab Results   Component Value Date    CREATININE 1.1 10/03/2024    EGFRNORACEVR >60 10/03/2024     Known diabetic complications: peripheral neuropathy    Weight trend:  Wt Readings from Last 8 Encounters:   12/31/24 67.2 kg (148 lb 1.6 oz)   11/19/24 67.3 kg (148 lb 4.8 oz)   10/15/24 69.7 kg (153 lb 11.2 oz)   10/11/24 71.2 kg (156 lb 13.7 oz)   10/03/24 71 kg (156 lb 8 oz)   08/29/24 74 kg (163 lb 2.3 oz)   05/16/24 71 kg (156 lb 8.4 oz)   04/24/24 70.1 kg (154 lb 6.9 oz)     Family history of diabetes:  Brother    Prior visit with diabetes education: No    Current diet: 3-4 meals per day  Current exercise: He is very active at work. He is a  at work     Blood glucose monitoring at home: Libre2 ()         Glucagon: Yes    Diabetes Management Status  Statin: Taking  ACE/ARB: Not taking    Screening or Prevention Patient's value   HgA1C Testing and Control   Lab Results   Component Value Date    HGBA1C 8.0 (H) 10/03/2024        LDL control Lab Results   Component Value Date    LDLCALC 73.6 08/12/2024      Nephropathy screening Lab Results   Component Value Date    MICALBCREAT 4.5 10/03/2024        Lab Results   Component Value Date    TSH 1.178 10/03/2024     Last eye exam: Most Recent Eye Exam Date: Not Found    Review of Systems  As above    Social and family history reviewed  Current medications  "and allergies reviewed    Objective:   /69   Pulse 89   Resp 16   Ht 5' 3" (1.6 m)   Wt 67.2 kg (148 lb 1.6 oz)   SpO2 98%   BMI 26.23 kg/m²   Physical Exam  Alert, oriented    BP Readings from Last 1 Encounters:   12/31/24 122/69      Wt Readings from Last 1 Encounters:   12/31/24 0950 67.2 kg (148 lb 1.6 oz)     Body mass index is 26.23 kg/m².    Lab Review:   Lab Results   Component Value Date    HGBA1C 8.0 (H) 10/03/2024     Lab Results   Component Value Date    CHOL 136 08/12/2024    HDL 48 08/12/2024    LDLCALC 73.6 08/12/2024    TRIG 72 08/12/2024    CHOLHDL 35.3 08/12/2024     Lab Results   Component Value Date     10/03/2024    K 4.7 10/03/2024     10/03/2024    CO2 24 10/03/2024     (H) 10/03/2024    BUN 16 10/03/2024    CREATININE 1.1 10/03/2024    CALCIUM 9.2 10/03/2024    PROT 7.1 10/03/2024    ALBUMIN 3.9 10/03/2024    BILITOT 0.6 10/03/2024    ALKPHOS 81 10/03/2024    AST 22 10/03/2024    ALT 29 10/03/2024    ANIONGAP 8 10/03/2024    ESTGFRAFRICA >60.0 07/29/2022    EGFRNONAA >60.0 07/29/2022    TSH 1.178 10/03/2024     All pertinent labs reviewed    Assessment and Plan     Type 2 diabetes mellitus without complication  CGM data reviewed with TIR of 64%. No hypoglycemia. He admits to forgetting to bolus before meals at times. I again stressed importance of bolusing before meals. He reports unhealthy eating around the holidays. Will improve diet.     His c-peptide was low. +ANTHONY, but negative islet cell antibodies. He is tolerating Jardiance. Could also consider starting Metformin in the future. No CKD.     Plan   - Continue Lantus 14 U once daily   - Continue Novolog 6 U ACTID  - Continue Jardiance 25 mg once daily   - Continue Libre2 ()    Follow up 6 weeks    Mixed hyperlipidemia  Continue statin    Essential hypertension  He has been able to discontinue all BP medications. BP controlled in clinic. Normal UACR. Can stay off ACEi/ARB for now.     Iesha Freire" PAIGE  Endocrinology

## 2025-01-08 ENCOUNTER — OCHSNER VIRTUAL EMERGENCY DEPARTMENT (OUTPATIENT)
Facility: CLINIC | Age: 60
End: 2025-01-08
Payer: MEDICAID

## 2025-01-08 ENCOUNTER — NURSE TRIAGE (OUTPATIENT)
Dept: ADMINISTRATIVE | Facility: CLINIC | Age: 60
End: 2025-01-08
Payer: MEDICAID

## 2025-01-08 ENCOUNTER — TELEPHONE (OUTPATIENT)
Dept: ORTHOPEDICS | Facility: CLINIC | Age: 60
End: 2025-01-08
Payer: MEDICAID

## 2025-01-08 NOTE — TELEPHONE ENCOUNTER
Pt fell in his boat 5 days ago and injured his thumb on his left hand. Pain is 5/10 comes and goes. Swollen. He has been applying ice. He can move it with the other fingers to make it bend. A little numbness. Couldn't zip lock bags while working. Care advice recommends pt go to ER or office with MD approval. [place] provider Dr Carver notified. Blood sugar is 130. Unaqble to get an appt with [facility] until Feb, Dr Carver recommended pt go to  with xray. Information given to pt. Pt stated he is closer to [facility] so he will go there.   Reason for Disposition   Sounds like a serious injury to the triager    Additional Information   Negative: Major bleeding (actively dripping or spurting) that can't be stopped   Negative: Sounds like a life-threatening emergency to the triager   Negative: Amputation   Negative: High-pressure injection injury (e.g., from paint gun, usually work-related)   Negative: Looks like a broken bone or dislocated joint (e.g., crooked or deformed)   Negative: Skin is split open or gaping (length > 1/2 inch or 12 mm)   Negative: Cut or scrape is very deep (e.g., can see bone or tendons)   Negative: Bleeding won't stop after 10 minutes of direct pressure (using correct technique)   Negative: Dirt in the wound and not removed after 15 minutes of scrubbing   Negative: Cut with numbness (loss of sensation) of finger   Negative: Fingernail is partially torn from a crush injury  (Exception: Torn nail from catching it on something.)    Protocols used: Finger Injury-A-[place]

## 2025-01-08 NOTE — PLAN OF CARE-OVED
[facility] Plan of Care Note    Referral source: Nurse On-Call      Reason for consult: Joint Pain(s) and Skin Infection      Additional History: Pt fell in his boat 5 days ago and injured his thumb on his left hand. Pain is 5/10 comes and goes. Swollen. He has been applying ice. He can move it with the other fingers to make it bend. A little numbness. Couldn't zip lock bags while working. Glucose today reported to be 130. No systemic sxs including fever, n/v.     Tdap UTD.     Appears he had similar issue in 2/2024-followed with hand surgery, ultimately required debridement. Unfortunately so availability with  until March.       Disposition recommended: Urgent Care for exam, POC glucose, possible XR, abx  Notified by triage RN, pt is planning to go to [facility] ED due to proximity for him. , ED physician, notified.

## 2025-02-06 ENCOUNTER — OFFICE VISIT (OUTPATIENT)
Dept: UROLOGY | Facility: CLINIC | Age: 60
End: 2025-02-06
Attending: SPECIALIST
Payer: MEDICAID

## 2025-02-06 VITALS — HEIGHT: 63 IN | BODY MASS INDEX: 26.37 KG/M2 | WEIGHT: 148.81 LBS

## 2025-02-06 DIAGNOSIS — R35.1 BPH ASSOCIATED WITH NOCTURIA: Primary | ICD-10-CM

## 2025-02-06 DIAGNOSIS — N40.1 BPH ASSOCIATED WITH NOCTURIA: Primary | ICD-10-CM

## 2025-02-06 PROCEDURE — 99214 OFFICE O/P EST MOD 30 MIN: CPT | Mod: S$PBB,,, | Performed by: SPECIALIST

## 2025-02-06 PROCEDURE — 1160F RVW MEDS BY RX/DR IN RCRD: CPT | Mod: CPTII,,, | Performed by: SPECIALIST

## 2025-02-06 PROCEDURE — 3008F BODY MASS INDEX DOCD: CPT | Mod: CPTII,,, | Performed by: SPECIALIST

## 2025-02-06 PROCEDURE — 99214 OFFICE O/P EST MOD 30 MIN: CPT | Mod: PBBFAC | Performed by: SPECIALIST

## 2025-02-06 PROCEDURE — 99999 PR PBB SHADOW E&M-EST. PATIENT-LVL IV: CPT | Mod: PBBFAC,,, | Performed by: SPECIALIST

## 2025-02-06 PROCEDURE — 1159F MED LIST DOCD IN RCRD: CPT | Mod: CPTII,,, | Performed by: SPECIALIST

## 2025-02-06 NOTE — PROGRESS NOTES
"[facility] - Urology   Clinic Note    SUBJECTIVE:     Chief Complaint   Patient presents with    Follow-up     3 month follow up       Referral from: No ref. provider found.    History of Present Illness:  Ross Crystal is a 59 y.o. male who presents to clinic for BPH.    Doing well.  Compliant with tamsulosin 0.4 mg q.h.s. and finasteride 5 mg OD.  He has not yet tried increasing the dose of tamsulosin.  He denies dizziness or side effects.  His force of stream is improved.  Maybe only gets up once a night to void.  PSA normal at 0.36.  Past Medical History:   Diagnosis Date    Benign paroxysmal vertigo, bilateral     Diabetes mellitus     Diabetes mellitus type I     Hyperlipemia     Renal disorder     Urinary tract infection        Current Outpatient Medications on File Prior to Visit   Medication Sig Dispense Refill    amoxicillin-clavulanate 875-125mg (AUGMENTIN) 875-125 mg per tablet Take 1 tablet by mouth 2 (two) times a day. 20 tablet 1    aspirin (ECOTRIN) 81 MG EC tablet Take 81 mg by mouth once daily.      atorvastatin (LIPITOR) 80 MG tablet Take 80 mg by mouth.      BAQSIMI 3 mg/actuation [place] 3 mg by Nasal route.      BD ULTRA-FINE JENNY PEN NEEDLE 32 gauge x 5/32" Ndle 4 (four) times daily.      blood-glucose sensor (FREESTYLE JULIO 3 PLUS SENSOR) Sadaf To use for glucose monitoring every 15 days. 2 each 11    empagliflozin (JARDIANCE) 25 mg tablet Take 1 tablet (25 mg total) by mouth once daily. 30 tablet 11    finasteride (PROSCAR) 5 mg tablet Take 1 tablet (5 mg total) by mouth once daily. 30 tablet 11    fish oil-omega-3 fatty acids 300-1,000 mg capsule Take 1 capsule by mouth 2 (two) times a day.      flash glucose scanning reader (FREESTYLE JULIO 2 READER) Misc Use as instructed for blood glucose monitoring.      flash glucose sensor (FREESTYLE JULIO 14 DAY SENSOR) Kit Inject 1 each into the skin.      FREESTYLE JULIO 2 SENSOR Kit CHANGE EVERY 14 DAYS FOR BLOOD GLUCOSE MONITORING      " HYDROcodone-acetaminophen (NORCO) 5-325 mg per tablet Take 1 tablet by mouth every 4 (four) hours as needed for Pain. 20 tablet 0    hydrocodone-acetaminophen 10-325mg (NORCO)  mg Tab Take 1 tablet by mouth 2 (two) times a day.      ibuprofen (ADVIL,MOTRIN) 600 MG tablet Take 1 tablet (600 mg total) by mouth every 6 (six) hours as needed for Pain. 20 tablet 0    insulin glargine U-100, Lantus, 100 unit/mL (3 mL) SubQ InPn pen Inject 20 Units into the skin once daily. 6 mL 11    insulin syringe-needle U-100 1 mL 31 gauge x 5/16 Syrg       LEVEMIR FLEXTOUCH U-100 INSULN 100 unit/mL (3 mL) InPn pen Inject 35 Units into the skin 2 (two) times daily.      meclizine (ANTIVERT) 50 MG tablet Take 50 mg by mouth 2 (two) times daily.      meloxicam (MOBIC) 15 MG tablet Take 15 mg by mouth.      minocycline (MINOCIN,DYNACIN) 100 MG capsule Take 1 capsule (100 mg total) by mouth 2 (two) times a day. 28 capsule 1    naproxen (NAPROSYN) 500 MG tablet Take 1 tablet (500 mg total) by mouth 2 (two) times daily with meals. 20 tablet 0    nicotine (NICODERM CQ) 21 mg/24 hr Place 1 patch onto the skin once daily. 28 patch 0    NOVOLOG FLEXPEN U-100 INSULIN 100 unit/mL (3 mL) InPn pen Inject 10 Units into the skin 3 (three) times daily with meals. 9 mL 11    pravastatin (PRAVACHOL) 40 MG tablet Take 40 mg by mouth every evening.      tamsulosin (FLOMAX) 0.4 mg Cap Take 0.4 mg by mouth once daily.      tamsulosin (FLOMAX) 0.4 mg Cap Take 1 capsule (0.4 mg total) by mouth 2 (two) times a day. 60 capsule 11    TRUE METRIX GLUCOSE TEST STRIP Strp 1 strip 4 (four) times daily.      TRUEPLUS LANCETS 33 gauge Misc USE TO TEST BLOOD SUGAR TWO TIMES DAILY      FREESTYLE JULIO 3 READER Misc 1 Device by Misc.(Non-Drug; Combo Route) route once. for 1 dose 1 each 0     No current facility-administered medications on file prior to visit.         OBJECTIVE:     Estimated body mass index is 26.36 kg/m² as calculated from the following:    Height  "as of this encounter: 5' 3" (1.6 m).    Weight as of this encounter: 67.5 kg (148 lb 13 oz).    Vital Signs (Most Recent)  There were no vitals filed for this visit.    Physical Exam:    Physical Exam     GENERAL: patient sitting comfortably  HEENT: normocephalic  NECK: supple, no JVD  PULM: normal chest rise, no increased WOB  HEART: non-diaphoretic  ABDO: soft, nondistended, nontender  BACK: no CVA tenderness bilaterally  SKIN: warm, dry, well perfused  EXT: no bruising or edema  NEURO: grossly normal with no focal deficits  PSYCH: appropriate mood and affect    Genitourinary Exam:  deferred      LABS:     Lab Results   Component Value Date    BUN 16 10/03/2024    CREATININE 1.1 10/03/2024    WBC 11.20 08/12/2024    HGB 16.2 08/12/2024    HCT 48.7 08/12/2024     08/12/2024    AST 22 10/03/2024    ALT 29 10/03/2024    ALKPHOS 81 10/03/2024    ALBUMIN 3.9 10/03/2024    HGBA1C 8.0 (H) 10/03/2024    INR 1.0 02/22/2018        Urinalysis:   No results found for: "UAREFLEX"     PSA:  Lab Results   Component Value Date    PSA 0.94 10/19/2015    PSADIAG 0.36 10/16/2024    PSADIAG 0.78 09/16/2014       Testosterone:  No results found for: "TOTALTESTOST", "TESTOSTERONE"     Imaging:  I have personally reviewed all relevant imaging studies.    No results found for this or any previous visit (from the past 2160 hours).  No results found for this or any previous visit (from the past 2160 hours).  X-Ray Hand 3 view Left  Narrative: EXAMINATION:  XR HAND COMPLETE 3 VIEW LEFT    CLINICAL HISTORY:  thumb pain;.    TECHNIQUE:  PA, lateral, and oblique views of the left hand were performed.    FINDINGS:  There is no fracture, dislocation, or bony erosion identified.  Impression: As above.    Electronically signed by: Khai Gaines MD  Date:    01/08/2025  Time:    10:34         ASSESSMENT     1. BPH associated with nocturia        PLAN:     Continue tamsulosin and finasteride  Follow up in 1 year with PSA      Misha LARIOS" MD Patti  Urology  [facility]     Disclaimer: This note has been generated using voice-recognition software. There may be typographical errors that have been missed during proof-reading.

## 2025-02-11 ENCOUNTER — HOSPITAL ENCOUNTER (OUTPATIENT)
Facility: HOSPITAL | Age: 60
Discharge: HOME | End: 2025-02-12
Attending: FAMILY MEDICINE | Admitting: INTERNAL MEDICINE
Payer: MEDICAID

## 2025-02-11 DIAGNOSIS — E11.9 TYPE 2 DIABETES MELLITUS WITHOUT COMPLICATION, UNSPECIFIED WHETHER LONG TERM INSULIN USE: ICD-10-CM

## 2025-02-11 DIAGNOSIS — R29.818 ACUTE FOCAL NEUROLOGICAL DEFICIT: ICD-10-CM

## 2025-02-11 DIAGNOSIS — I25.10 CARDIOVASCULAR DISEASE: ICD-10-CM

## 2025-02-11 DIAGNOSIS — I63.9 STROKE: ICD-10-CM

## 2025-02-11 DIAGNOSIS — H53.2 DIPLOPIA: Primary | ICD-10-CM

## 2025-02-11 LAB
ALBUMIN SERPL BCP-MCNC: 4 G/DL (ref 3.5–5.2)
ALP SERPL-CCNC: 76 U/L (ref 40–150)
ALT SERPL W/O P-5'-P-CCNC: 22 U/L (ref 10–44)
ANION GAP SERPL CALC-SCNC: 10 MMOL/L (ref 8–16)
AST SERPL-CCNC: 16 U/L (ref 10–40)
BASOPHILS # BLD AUTO: 0.06 K/UL (ref 0–0.2)
BASOPHILS NFR BLD: 0.6 % (ref 0–1.9)
BILIRUB SERPL-MCNC: 0.4 MG/DL (ref 0.1–1)
BUN SERPL-MCNC: 25 MG/DL (ref 6–20)
CALCIUM SERPL-MCNC: 9.3 MG/DL (ref 8.7–10.5)
CHLORIDE SERPL-SCNC: 105 MMOL/L (ref 95–110)
CHOLEST SERPL-MCNC: 188 MG/DL (ref 120–199)
CHOLEST/HDLC SERPL: 3.2 {RATIO} (ref 2–5)
CO2 SERPL-SCNC: 21 MMOL/L (ref 23–29)
CREAT SERPL-MCNC: 0.8 MG/DL (ref 0.5–1.4)
DIFFERENTIAL METHOD BLD: ABNORMAL
EOSINOPHIL # BLD AUTO: 0.2 K/UL (ref 0–0.5)
EOSINOPHIL NFR BLD: 2.3 % (ref 0–8)
ERYTHROCYTE [DISTWIDTH] IN BLOOD BY AUTOMATED COUNT: 13.8 % (ref 11.5–14.5)
EST. GFR  (NO RACE VARIABLE): >60 ML/MIN/1.73 M^2
GLUCOSE SERPL-MCNC: 150 MG/DL (ref 70–110)
HCT VFR BLD AUTO: 45.5 % (ref 40–54)
HDLC SERPL-MCNC: 59 MG/DL (ref 40–75)
HDLC SERPL: 31.4 % (ref 20–50)
HGB BLD-MCNC: 15.4 G/DL (ref 14–18)
IMM GRANULOCYTES # BLD AUTO: 0.07 K/UL (ref 0–0.04)
IMM GRANULOCYTES NFR BLD AUTO: 0.7 % (ref 0–0.5)
INR PPP: 0.9 (ref 0.8–1.2)
LDLC SERPL CALC-MCNC: 109.8 MG/DL (ref 63–159)
LYMPHOCYTES # BLD AUTO: 3.3 K/UL (ref 1–4.8)
LYMPHOCYTES NFR BLD: 32.2 % (ref 18–48)
MCH RBC QN AUTO: 29 PG (ref 27–31)
MCHC RBC AUTO-ENTMCNC: 33.8 G/DL (ref 32–36)
MCV RBC AUTO: 86 FL (ref 82–98)
MONOCYTES # BLD AUTO: 0.8 K/UL (ref 0.3–1)
MONOCYTES NFR BLD: 7.7 % (ref 4–15)
NEUTROPHILS # BLD AUTO: 5.8 K/UL (ref 1.8–7.7)
NEUTROPHILS NFR BLD: 56.5 % (ref 38–73)
NONHDLC SERPL-MCNC: 129 MG/DL
NRBC BLD-RTO: 0 /100 WBC
PLATELET # BLD AUTO: 236 K/UL (ref 150–450)
PMV BLD AUTO: 8.7 FL (ref 9.2–12.9)
POCT GLUCOSE: 125 MG/DL (ref 70–110)
POCT GLUCOSE: 229 MG/DL (ref 70–110)
POTASSIUM SERPL-SCNC: 4 MMOL/L (ref 3.5–5.1)
PROT SERPL-MCNC: 7.4 G/DL (ref 6–8.4)
PROTHROMBIN TIME: 10.1 SEC (ref 9–12.5)
RBC # BLD AUTO: 5.31 M/UL (ref 4.6–6.2)
SARS-COV-2 RDRP RESP QL NAA+PROBE: NEGATIVE
SODIUM SERPL-SCNC: 136 MMOL/L (ref 136–145)
TRIGL SERPL-MCNC: 96 MG/DL (ref 30–150)
TROPONIN I SERPL DL<=0.01 NG/ML-MCNC: <0.006 NG/ML (ref 0–0.03)
TSH SERPL DL<=0.005 MIU/L-ACNC: 3.77 UIU/ML (ref 0.4–4)
WBC # BLD AUTO: 10.22 K/UL (ref 3.9–12.7)

## 2025-02-11 PROCEDURE — 84443 ASSAY THYROID STIM HORMONE: CPT | Performed by: NURSE PRACTITIONER

## 2025-02-11 PROCEDURE — 99448 NTRPROF PH1/NTRNET/EHR 21-30: CPT | Mod: ,,, | Performed by: PSYCHIATRY & NEUROLOGY

## 2025-02-11 PROCEDURE — 93010 ELECTROCARDIOGRAM REPORT: CPT | Mod: ,,, | Performed by: INTERNAL MEDICINE

## 2025-02-11 PROCEDURE — 99285 EMERGENCY DEPT VISIT HI MDM: CPT | Mod: 25

## 2025-02-11 PROCEDURE — 80053 COMPREHEN METABOLIC PANEL: CPT | Performed by: NURSE PRACTITIONER

## 2025-02-11 PROCEDURE — 99900031 HC PATIENT EDUCATION (STAT)

## 2025-02-11 PROCEDURE — 99900035 HC TECH TIME PER 15 MIN (STAT)

## 2025-02-11 PROCEDURE — G0378 HOSPITAL OBSERVATION PER HR: HCPCS

## 2025-02-11 PROCEDURE — 25000003 PHARM REV CODE 250: Performed by: NURSE PRACTITIONER

## 2025-02-11 PROCEDURE — 85610 PROTHROMBIN TIME: CPT | Performed by: NURSE PRACTITIONER

## 2025-02-11 PROCEDURE — 83036 HEMOGLOBIN GLYCOSYLATED A1C: CPT | Performed by: SURGERY

## 2025-02-11 PROCEDURE — 84484 ASSAY OF TROPONIN QUANT: CPT | Performed by: NURSE PRACTITIONER

## 2025-02-11 PROCEDURE — 82962 GLUCOSE BLOOD TEST: CPT

## 2025-02-11 PROCEDURE — 94760 N-INVAS EAR/PLS OXIMETRY 1: CPT

## 2025-02-11 PROCEDURE — 87635 SARS-COV-2 COVID-19 AMP PRB: CPT | Performed by: NURSE PRACTITIONER

## 2025-02-11 PROCEDURE — 36415 COLL VENOUS BLD VENIPUNCTURE: CPT | Performed by: SURGERY

## 2025-02-11 PROCEDURE — 93005 ELECTROCARDIOGRAM TRACING: CPT

## 2025-02-11 PROCEDURE — 85025 COMPLETE CBC W/AUTO DIFF WBC: CPT | Performed by: NURSE PRACTITIONER

## 2025-02-11 PROCEDURE — 80061 LIPID PANEL: CPT | Performed by: NURSE PRACTITIONER

## 2025-02-11 RX ORDER — IBUPROFEN 200 MG
16 TABLET ORAL
Status: DISCONTINUED | OUTPATIENT
Start: 2025-02-11 | End: 2025-02-12 | Stop reason: HOSPADM

## 2025-02-11 RX ORDER — INSULIN GLARGINE 100 [IU]/ML
15 INJECTION, SOLUTION SUBCUTANEOUS NIGHTLY
Status: DISCONTINUED | OUTPATIENT
Start: 2025-02-11 | End: 2025-02-12 | Stop reason: HOSPADM

## 2025-02-11 RX ORDER — ATORVASTATIN CALCIUM 80 MG/1
80 TABLET, FILM COATED ORAL DAILY
Status: DISCONTINUED | OUTPATIENT
Start: 2025-02-12 | End: 2025-02-12

## 2025-02-11 RX ORDER — ONDANSETRON HYDROCHLORIDE 2 MG/ML
4 INJECTION, SOLUTION INTRAVENOUS EVERY 8 HOURS PRN
Status: DISCONTINUED | OUTPATIENT
Start: 2025-02-11 | End: 2025-02-12 | Stop reason: HOSPADM

## 2025-02-11 RX ORDER — GLUCAGON 1 MG
1 KIT INJECTION
Status: DISCONTINUED | OUTPATIENT
Start: 2025-02-11 | End: 2025-02-12 | Stop reason: HOSPADM

## 2025-02-11 RX ORDER — SODIUM CHLORIDE 0.9 % (FLUSH) 0.9 %
10 SYRINGE (ML) INJECTION
Status: DISCONTINUED | OUTPATIENT
Start: 2025-02-11 | End: 2025-02-12 | Stop reason: HOSPADM

## 2025-02-11 RX ORDER — IBUPROFEN 200 MG
24 TABLET ORAL
Status: DISCONTINUED | OUTPATIENT
Start: 2025-02-11 | End: 2025-02-12 | Stop reason: HOSPADM

## 2025-02-11 RX ORDER — TALC
6 POWDER (GRAM) TOPICAL NIGHTLY PRN
Status: DISCONTINUED | OUTPATIENT
Start: 2025-02-11 | End: 2025-02-12 | Stop reason: HOSPADM

## 2025-02-11 RX ORDER — CLOPIDOGREL BISULFATE 75 MG/1
300 TABLET ORAL ONCE
Status: COMPLETED | OUTPATIENT
Start: 2025-02-11 | End: 2025-02-11

## 2025-02-11 RX ORDER — INSULIN ASPART 100 [IU]/ML
6 INJECTION, SOLUTION INTRAVENOUS; SUBCUTANEOUS
Status: DISCONTINUED | OUTPATIENT
Start: 2025-02-12 | End: 2025-02-12 | Stop reason: HOSPADM

## 2025-02-11 RX ORDER — ASPIRIN 325 MG
325 TABLET ORAL ONCE
Status: COMPLETED | OUTPATIENT
Start: 2025-02-11 | End: 2025-02-11

## 2025-02-11 RX ORDER — ASPIRIN 81 MG/1
81 TABLET ORAL DAILY
Status: DISCONTINUED | OUTPATIENT
Start: 2025-02-12 | End: 2025-02-12 | Stop reason: HOSPADM

## 2025-02-11 RX ADMIN — CLOPIDOGREL BISULFATE 300 MG: 75 TABLET ORAL at 07:02

## 2025-02-11 RX ADMIN — ASPIRIN 325 MG ORAL TABLET 325 MG: 325 PILL ORAL at 07:02

## 2025-02-11 NOTE — ED TRIAGE NOTES
Pt arrived to ED c/o right sided blurry vision and right sided headache that started 3-4 days ago. Slight facial droop to right side. Last known normal was 2/06/25. Pt reports he was seen by the eye doctor who believes he may have had a stroke. Pt AAOX4, pt following commands, normal strength noted.

## 2025-02-11 NOTE — ED PROVIDER NOTES
Encounter Date: 2/11/2025       History     Chief Complaint   Patient presents with    Blurred Vision     Pt arrived to ED c/o right sided blurry vision and right sided headache that started 3-4 days ago. Slight facial droop to right side. Last known normal was 2/06/25. Pt reports he was seen by the eye doctor who believes he may have had a stroke. Pt AAOX4, pt following commands, normal strength noted.      Ross Crystal is a 59 y.o. male with PMH of DM type 1, hyperlipidemia, UTI, vertigo presenting to the ED for evaluation of diplopia.  Patient reports that he developed double vision in his R eye 3 days ago with an associated mild, frontal headache. He put a patch on his eye in hopes that it would improve. He was evaluated by Opthalmology for the past 2 days and was told that there was concern that he might have had a stroke.  He reports that is now additionally having blurry vision in his left eye that started approximately 2 hours PTA.  He denies associated facial tingling or numbness.  Denies chest pain or shortness of breath.  Denies ocular pain.    The history is provided by the patient.     Review of patient's allergies indicates:   Allergen Reactions    Sulfamethoxazole      Other Reaction(s): hives rash    Trimethoprim      Other Reaction(s): hives rash    Bactrim [sulfamethoxazole-trimethoprim] Rash     Past Medical History:   Diagnosis Date    Benign paroxysmal vertigo, bilateral     Diabetes mellitus     Diabetes mellitus type I     Hyperlipemia     Renal disorder     Urinary tract infection      Past Surgical History:   Procedure Laterality Date    CARPAL TUNNEL RELEASE  25 YEARS AGO    COLONOSCOPY      COLONOSCOPY N/A 1/25/2016    Procedure: COLONOSCOPY;  Surgeon: Luis Bogran-Reyes, MD;  Location: [facility];  Service: Endoscopy;  Laterality: N/A;    DEBRIDEMENT, PHALANX, HAND Left 12/29/2022    Procedure: DEBRIDEMENT, PHALANX, HAND;  Surgeon: Abdon Reid Jr., MD;  Location: [facility];  Service:  Orthopedics;  Laterality: Left;    DEBRIDEMENT, PHALANX, HAND Left 2/20/2024    Procedure: DEBRIDEMENT, PHALANX, HAND;  Surgeon: Abdon Reid Jr., MD;  Location: [facility] OR;  Service: Orthopedics;  Laterality: Left;    ESOPHAGOGASTRODUODENOSCOPY      HERNIA REPAIR      HERNIA REPAIR       Family History   Problem Relation Name Age of Onset    Alzheimer's disease Mother      Cancer Father          throat cancer    Prostate cancer Neg Hx      Kidney disease Neg Hx       Social History     Tobacco Use    Smoking status: Every Day     Current packs/day: 2.50     Average packs/day: 2.5 packs/day for 36.1 years (90.3 ttl pk-yrs)     Types: Cigarettes     Start date: 1989    Smokeless tobacco: Never    Tobacco comments:     Tobacco cessation   Substance Use Topics    Alcohol use: No     Alcohol/week: 0.0 standard drinks of alcohol    Drug use: No     Comment: 30YEARS AGO, PATIENT ON PRESCRIBED PAIN MEDICATION.      Review of Systems   Constitutional:  Negative for appetite change, chills and fever.   HENT:  Negative for congestion, ear discharge, ear pain, postnasal drip, rhinorrhea and sore throat.    Eyes:  Positive for visual disturbance (diplopia R eye).   Respiratory:  Negative for cough, chest tightness and shortness of breath.    Cardiovascular:  Negative for chest pain.   Gastrointestinal:  Negative for abdominal distention, abdominal pain and nausea.   Genitourinary:  Negative for dysuria, flank pain, hematuria and urgency.   Musculoskeletal:  Negative for arthralgias and back pain.   Skin:  Negative for rash.   Neurological:  Negative for dizziness, weakness, numbness and headaches.   Hematological:  Does not bruise/bleed easily.       Physical Exam     Initial Vitals [02/11/25 1644]   BP Pulse Resp Temp SpO2   (!) 140/82 84 18 98.6 °F (37 °C) 96 %      MAP       --         Physical Exam    Nursing note and vitals reviewed.  Constitutional: He appears well-developed and well-nourished.   HENT:   Head:  Normocephalic and atraumatic.   Eyes: Conjunctivae and EOM are normal. Pupils are equal, round, and reactive to light.   Pupils equal and reactive    Neck: Neck supple.   Cardiovascular:  Normal rate, regular rhythm, normal heart sounds and intact distal pulses.           Pulmonary/Chest: Breath sounds normal.   Abdominal: Abdomen is soft. Bowel sounds are normal.   Musculoskeletal:         General: Normal range of motion.      Cervical back: Neck supple.     Neurological: He is alert and oriented to person, place, and time. He has normal strength.   Skin: Skin is warm and dry.   Psychiatric: He has a normal mood and affect. His behavior is normal. Judgment and thought content normal.         ED Course   Procedures  Labs Reviewed   POCT GLUCOSE - Abnormal       Result Value    POCT Glucose 229 (*)    TSH          Imaging Results              CT Head Without Contrast (Final result)  Result time 02/11/25 17:22:43      Final result by Sterling Kwong MD (02/11/25 17:22:43)                   Impression:      No acute findings.      Electronically signed by: Sterling Kwong  Date:    02/11/2025  Time:    17:22               Narrative:    EXAMINATION:  CT HEAD WITHOUT CONTRAST    CLINICAL HISTORY:  Neuro deficit, acute, stroke suspected;    TECHNIQUE:  Low dose axial images were obtained through the head.  Coronal and sagittal reformations were also performed. Contrast was not administered.    COMPARISON:  None.    FINDINGS:  Intracranial compartment:    Ventricles and sulci are normal in size for age without evidence of hydrocephalus. No extra-axial blood or fluid collections.    Mild chronic microvascular ischemia.  No parenchymal mass, hemorrhage, edema or major vascular distribution infarct.    Skull/extracranial contents (limited evaluation): No fracture. Mastoid air cells and paranasal sinuses are essentially clear.                                       Medications   aspirin EC tablet 81 mg (has no  administration in time range)   atorvastatin tablet 80 mg (has no administration in time range)   glucose chewable tablet 16 g (has no administration in time range)   glucose chewable tablet 24 g (has no administration in time range)   dextrose 50% injection 12.5 g (has no administration in time range)   dextrose 50% injection 25 g (has no administration in time range)   glucagon (human recombinant) injection 1 mg (has no administration in time range)   sodium chloride 0.9% flush 10 mL (has no administration in time range)   ondansetron injection 4 mg (has no administration in time range)   melatonin tablet 6 mg (has no administration in time range)   insulin glargine U-100 (Lantus) pen 15 Units (has no administration in time range)   insulin aspart U-100 pen 6 Units (has no administration in time range)   aspirin tablet 325 mg (has no administration in time range)   clopidogreL tablet 300 mg (has no administration in time range)     Medical Decision Making  Evaluation of a 59-year-old male presenting with diplopia.  Initially right-sided diplopia that started 3 days ago, new left-sided diplopia that started approximately 2 hours PTA  Tele stroke consult activated   Presents nontoxic appearing with stable vital signs  Bilateral eyes with good EOM. No ptosis   No conjunctival redness/drainage.     Diff dx includes CVA, cranial nerve palsy, cataract, macular disruption, eye muscular dysfunction    Amount and/or Complexity of Data Reviewed  Labs: ordered. Decision-making details documented in ED Course.  Radiology: ordered. Decision-making details documented in ED Course.  ECG/medicine tests: ordered and independent interpretation performed.     Details: Normal sinus rhythm, rate 74.  Normal SC and QRS interval.  No STEMI.  No significant change when compared to EKG of April 2024    Risk  OTC drugs.  Prescription drug management.  Risk Details: Lab workup is grossly normal    Tele stroke consult completed with Dr. Forman  with recs as follows  -MRI brain w/o contrast to evaluate for presence of and characterization of infarct  - If passes bedside swallow: Load with aspirin 325 mg and clopidogrel 300 mg, followed by ASA 81 and clopidogrel 75 mg daily.  - If unable to swallow: given rectal  and consider NGT placement.  - High intensity statin  - Transothoracic echocardiogram  - Lipid profile and A1c for evaluation of modifiable risk factors  - PT/OT/SLP eval and Rx  - Permissive HTN < 220/120 mmHg x 48-72h pending results of vessel imaging    Will admit to hospital medicine with plan for MRI in AM.                                         Clinical Impression:  Final diagnoses:  [R29.818] Acute focal neurological deficit          ED Disposition Condition    Observation Stable                Inessa Huddleston NP  02/11/25 [id number]

## 2025-02-11 NOTE — TELEMEDICINE CONSULT
[facility]  Vascular Neurology  Comprehensive Stroke Center  TeleVascular Neurology Interprofessional Consult Note           Consult Information  Consults    Consulting Provider: LOS ANDRES   Patient Location:  Pending sale to [facility] EMERGENCY DEPARTMENT     Summary of patient's symptoms:  Diplopia x 3 days.  Saw Ophtho who was concerned symptoms could be to stroke.     Images personally reviewed and interpreted:  Study: Head CT  Study Interpretation: no acute findings     Assessment and plan:  Patient out of window for thrombolysis.  Recommend CTA head/neck now, followed by admission for MRI brain / stroke workup.      - MRI brain w/o contrast to evaluate for presence of and characterization of infarct  - If passes bedside swallow: Load with aspirin 325 mg and clopidogrel 300 mg, followed by ASA 81 and clopidogrel 75 mg daily.  - If unable to swallow: given rectal  and consider NGT placement.  - High intensity statin  - Transothoracic echocardiogram  - Lipid profile and A1c for evaluation of modifiable risk factors  - PT/OT/SLP eval and Rx  - Permissive HTN < 220/120 mmHg x 48-72h pending results of vessel imaging       I spent approximately 20 minutes on this encounter. More than half of that time was spent communicating with the consulting provider and coordinating patient care.       Chanell Forman MD        This encounter was conducted as an interprofessional communication between providers at the spoke and vascular neurologist. The interaction was completed over the phone or via secure messaging (electronic medical record - [employer] Secure Chat).     Once this note was completed, a written copy was sent back to the provider via fax or electronic medical record.

## 2025-02-12 VITALS
RESPIRATION RATE: 20 BRPM | HEIGHT: 63 IN | WEIGHT: 151.88 LBS | TEMPERATURE: 98 F | DIASTOLIC BLOOD PRESSURE: 70 MMHG | HEART RATE: 75 BPM | BODY MASS INDEX: 26.91 KG/M2 | OXYGEN SATURATION: 96 % | SYSTOLIC BLOOD PRESSURE: 122 MMHG

## 2025-02-12 PROBLEM — H53.2 DIPLOPIA: Status: ACTIVE | Noted: 2025-02-12

## 2025-02-12 PROBLEM — N40.0 BPH (BENIGN PROSTATIC HYPERPLASIA): Status: ACTIVE | Noted: 2025-02-12

## 2025-02-12 LAB
AV INDEX (PROSTH): 0.89
AV MEAN GRADIENT: 1 MMHG
AV PEAK GRADIENT: 3 MMHG
AV VALVE AREA BY VELOCITY RATIO: 2.7 CM²
AV VALVE AREA: 2.8 CM²
AV VELOCITY RATIO: 0.88
BSA FOR ECHO PROCEDURE: 1.75 M2
CV ECHO LV RWT: 0.57 CM
DOP CALC AO PEAK VEL: 0.8 M/S
DOP CALC AO VTI: 15.2 CM
DOP CALC LVOT AREA: 3.1 CM2
DOP CALC LVOT DIAMETER: 2 CM
DOP CALC LVOT PEAK VEL: 0.7 M/S
DOP CALC LVOT STROKE VOLUME: 42.4 CM3
DOP CALCLVOT PEAK VEL VTI: 13.5 CM
E WAVE DECELERATION TIME: 196 MSEC
E/A RATIO: 1.04
E/E' RATIO: 5 M/S
ECHO LV POSTERIOR WALL: 1 CM (ref 0.6–1.1)
ESTIMATED AVG GLUCOSE: 169 MG/DL (ref 68–131)
FRACTIONAL SHORTENING: 31.4 % (ref 28–44)
HBA1C MFR BLD: 7.5 % (ref 4–5.6)
INTERVENTRICULAR SEPTUM: 0.7 CM (ref 0.6–1.1)
IVC DIAMETER: 1.55 CM
IVRT: 95 MSEC
LA MAJOR: 3.5 CM
LEFT ATRIUM AREA SYSTOLIC (APICAL 2 CHAMBER): 9.61 CM2
LEFT ATRIUM AREA SYSTOLIC (APICAL 4 CHAMBER): 8.81 CM2
LEFT ATRIUM SIZE: 2 CM
LEFT ATRIUM VOLUME INDEX MOD: 10 ML/M2
LEFT ATRIUM VOLUME MOD: 18 ML
LEFT INTERNAL DIMENSION IN SYSTOLE: 2.4 CM (ref 2.1–4)
LEFT VENTRICLE DIASTOLIC VOLUME INDEX: 29.6 ML/M2
LEFT VENTRICLE DIASTOLIC VOLUME: 50.92 ML
LEFT VENTRICLE END SYSTOLIC VOLUME APICAL 2 CHAMBER: 20.01 ML
LEFT VENTRICLE END SYSTOLIC VOLUME APICAL 4 CHAMBER: 16.54 ML
LEFT VENTRICLE MASS INDEX: 47.6 G/M2
LEFT VENTRICLE SYSTOLIC VOLUME INDEX: 11.1 ML/M2
LEFT VENTRICLE SYSTOLIC VOLUME: 19.14 ML
LEFT VENTRICULAR INTERNAL DIMENSION IN DIASTOLE: 3.5 CM (ref 3.5–6)
LEFT VENTRICULAR MASS: 81.9 G
LV LATERAL E/E' RATIO: 4.3 M/S
LV SEPTAL E/E' RATIO: 6.4 M/S
LVED V (TEICH): 50.92 ML
LVES V (TEICH): 19.14 ML
LVOT MG: 0.78 MMHG
LVOT MV: 0.39 CM/S
MV PEAK A VEL: 0.49 M/S
MV PEAK E VEL: 0.51 M/S
MV STENOSIS PRESSURE HALF TIME: 56.73 MS
MV VALVE AREA P 1/2 METHOD: 3.88 CM2
OHS CV RV/LV RATIO: 0.71 CM
PISA TR MAX VEL: 1.6 M/S
POCT GLUCOSE: 196 MG/DL (ref 70–110)
POCT GLUCOSE: 235 MG/DL (ref 70–110)
PULM VEIN S/D RATIO: 1.17
PV MV: 0.44 M/S
PV PEAK D VEL: 0.3 M/S
PV PEAK GRADIENT: 2 MMHG
PV PEAK S VEL: 0.35 M/S
PV PEAK VELOCITY: 0.67 M/S
RA MAJOR: 4.24 CM
RA PRESSURE ESTIMATED: 3 MMHG
RIGHT VENTRICLE DIASTOLIC BASEL DIMENSION: 2.5 CM
RIGHT VENTRICULAR END-DIASTOLIC DIMENSION: 2.52 CM
RV TB RVSP: 5 MMHG
RV TISSUE DOPPLER FREE WALL SYSTOLIC VELOCITY 1 (APICAL 4 CHAMBER VIEW): 20.59 CM/S
TDI LATERAL: 0.12 M/S
TDI SEPTAL: 0.08 M/S
TDI: 0.1 M/S
TR MAX PG: 10 MMHG
TRICUSPID ANNULAR PLANE SYSTOLIC EXCURSION: 2.59 CM
TV REST PULMONARY ARTERY PRESSURE: 13 MMHG
Z-SCORE OF LEFT VENTRICULAR DIMENSION IN END DIASTOLE: -3.1
Z-SCORE OF LEFT VENTRICULAR DIMENSION IN END SYSTOLE: -1.64

## 2025-02-12 PROCEDURE — 25000003 PHARM REV CODE 250: Performed by: PHYSICIAN ASSISTANT

## 2025-02-12 PROCEDURE — 94760 N-INVAS EAR/PLS OXIMETRY 1: CPT | Mod: XB

## 2025-02-12 PROCEDURE — A9585 GADOBUTROL INJECTION: HCPCS | Performed by: INTERNAL MEDICINE

## 2025-02-12 PROCEDURE — G0378 HOSPITAL OBSERVATION PER HR: HCPCS

## 2025-02-12 PROCEDURE — 94799 UNLISTED PULMONARY SVC/PX: CPT

## 2025-02-12 PROCEDURE — 25500020 PHARM REV CODE 255: Performed by: INTERNAL MEDICINE

## 2025-02-12 PROCEDURE — 25000003 PHARM REV CODE 250: Performed by: SURGERY

## 2025-02-12 PROCEDURE — 93005 ELECTROCARDIOGRAM TRACING: CPT

## 2025-02-12 PROCEDURE — 96372 THER/PROPH/DIAG INJ SC/IM: CPT | Performed by: SURGERY

## 2025-02-12 PROCEDURE — 99900035 HC TECH TIME PER 15 MIN (STAT)

## 2025-02-12 PROCEDURE — 63600175 PHARM REV CODE 636 W HCPCS: Performed by: SURGERY

## 2025-02-12 PROCEDURE — 93010 ELECTROCARDIOGRAM REPORT: CPT | Mod: ,,, | Performed by: INTERNAL MEDICINE

## 2025-02-12 RX ORDER — INSULIN GLARGINE 100 [IU]/ML
12 INJECTION, SOLUTION SUBCUTANEOUS DAILY
Start: 2025-02-12

## 2025-02-12 RX ORDER — TAMSULOSIN HYDROCHLORIDE 0.4 MG/1
0.4 CAPSULE ORAL 2 TIMES DAILY
Status: DISCONTINUED | OUTPATIENT
Start: 2025-02-12 | End: 2025-02-12 | Stop reason: HOSPADM

## 2025-02-12 RX ORDER — PRAVASTATIN SODIUM 40 MG/1
40 TABLET ORAL NIGHTLY
Status: DISCONTINUED | OUTPATIENT
Start: 2025-02-13 | End: 2025-02-12 | Stop reason: HOSPADM

## 2025-02-12 RX ORDER — HYDROCODONE BITARTRATE AND ACETAMINOPHEN 5; 325 MG/1; MG/1
1 TABLET ORAL EVERY 4 HOURS PRN
Status: DISCONTINUED | OUTPATIENT
Start: 2025-02-12 | End: 2025-02-12

## 2025-02-12 RX ORDER — FINASTERIDE 5 MG/1
5 TABLET, FILM COATED ORAL DAILY
Status: DISCONTINUED | OUTPATIENT
Start: 2025-02-12 | End: 2025-02-12 | Stop reason: HOSPADM

## 2025-02-12 RX ORDER — GADOBUTROL 604.72 MG/ML
6 INJECTION INTRAVENOUS
Status: COMPLETED | OUTPATIENT
Start: 2025-02-12 | End: 2025-02-12

## 2025-02-12 RX ORDER — PRAVASTATIN SODIUM 40 MG/1
40 TABLET ORAL NIGHTLY
Status: DISCONTINUED | OUTPATIENT
Start: 2025-02-12 | End: 2025-02-12

## 2025-02-12 RX ORDER — HYDROCODONE BITARTRATE AND ACETAMINOPHEN 5; 325 MG/1; MG/1
1 TABLET ORAL EVERY 8 HOURS PRN
Status: DISCONTINUED | OUTPATIENT
Start: 2025-02-12 | End: 2025-02-12 | Stop reason: HOSPADM

## 2025-02-12 RX ADMIN — INSULIN ASPART 6 UNITS: 100 INJECTION, SOLUTION INTRAVENOUS; SUBCUTANEOUS at 12:02

## 2025-02-12 RX ADMIN — GADOBUTROL 10 ML: 604.72 INJECTION INTRAVENOUS at 06:02

## 2025-02-12 RX ADMIN — ASPIRIN 81 MG: 81 TABLET, COATED ORAL at 08:02

## 2025-02-12 RX ADMIN — FINASTERIDE 5 MG: 5 TABLET, FILM COATED ORAL at 12:02

## 2025-02-12 RX ADMIN — ATORVASTATIN CALCIUM 80 MG: 80 TABLET, FILM COATED ORAL at 08:02

## 2025-02-12 RX ADMIN — TAMSULOSIN HYDROCHLORIDE 0.4 MG: 0.4 CAPSULE ORAL at 12:02

## 2025-02-12 RX ADMIN — INSULIN ASPART 6 UNITS: 100 INJECTION, SOLUTION INTRAVENOUS; SUBCUTANEOUS at 08:02

## 2025-02-12 NOTE — NURSING
Patient discharged note was updated by physician. Discussed discharge with patient . Printed discharge paperwork and provided patient with copy, reading over important points to patient. All of patients questions answered at this time. Patient transported via wheel chair accompanied by med surge. Patient VSS stable.

## 2025-02-12 NOTE — PLAN OF CARE
02/12/25 0912   Rounds   Attendance Provider;Nurse    Discharge Plan A Home   Why the patient remains in the hospital Requires continued medical care   Transition of Care Barriers None     Care team at bedside, discussed plan of care with patient. Discharge planning initiated. Patient provided with Case Management contact information and encouraged to call with concerns or questions. Discharge Planning Begins on Admission Pamphlet provided.  Will continue to follow for duration of stay.

## 2025-02-12 NOTE — SUBJECTIVE & OBJECTIVE
Past Medical History:   Diagnosis Date    Benign paroxysmal vertigo, bilateral     Diabetes mellitus     Diabetes mellitus type I     Hyperlipemia     Renal disorder     Urinary tract infection        Past Surgical History:   Procedure Laterality Date    CARPAL TUNNEL RELEASE  25 YEARS AGO    COLONOSCOPY      COLONOSCOPY N/A 1/25/2016    Procedure: COLONOSCOPY;  Surgeon: Luis Bogran-Reyes, MD;  Location: [facility];  Service: Endoscopy;  Laterality: N/A;    DEBRIDEMENT, PHALANX, HAND Left 12/29/2022    Procedure: DEBRIDEMENT, PHALANX, HAND;  Surgeon: Abdon Reid Jr., MD;  Location: [facility];  Service: Orthopedics;  Laterality: Left;    DEBRIDEMENT, PHALANX, HAND Left 2/20/2024    Procedure: DEBRIDEMENT, PHALANX, HAND;  Surgeon: Abdon Reid Jr., MD;  Location: [facility];  Service: Orthopedics;  Laterality: Left;    ESOPHAGOGASTRODUODENOSCOPY      HERNIA REPAIR      HERNIA REPAIR         Review of patient's allergies indicates:   Allergen Reactions    Sulfamethoxazole      Other Reaction(s): hives rash    Trimethoprim      Other Reaction(s): hives rash    Bactrim [sulfamethoxazole-trimethoprim] Rash       No current facility-administered medications on file prior to encounter.     Current Outpatient Medications on File Prior to Encounter   Medication Sig    aspirin (ECOTRIN) 81 MG EC tablet Take 81 mg by mouth once daily.    atorvastatin (LIPITOR) 80 MG tablet Take 80 mg by mouth.    empagliflozin (JARDIANCE) 25 mg tablet Take 1 tablet (25 mg total) by mouth once daily.    finasteride (PROSCAR) 5 mg tablet Take 1 tablet (5 mg total) by mouth once daily.    fish oil-omega-3 fatty acids 300-1,000 mg capsule Take 1 capsule by mouth 2 (two) times a day.    HYDROcodone-acetaminophen (NORCO) 5-325 mg per tablet Take 1 tablet by mouth every 4 (four) hours as needed for Pain.    meloxicam (MOBIC) 15 MG tablet Take 15 mg by mouth.    pravastatin (PRAVACHOL) 40 MG tablet Take 40 mg by mouth every evening.    tamsulosin  "(FLOMAX) 0.4 mg Cap Take 0.4 mg by mouth once daily.    tamsulosin (FLOMAX) 0.4 mg Cap Take 1 capsule (0.4 mg total) by mouth 2 (two) times a day.    amoxicillin-clavulanate 875-125mg (AUGMENTIN) 875-125 mg per tablet Take 1 tablet by mouth 2 (two) times a day.    BAQSIMI 3 mg/actuation [place] 3 mg by Nasal route.    BD ULTRA-FINE JENNY PEN NEEDLE 32 gauge x 5/32" Ndle 4 (four) times daily.    blood-glucose sensor (FREESTYLE JULIO 3 PLUS SENSOR) Sadaf To use for glucose monitoring every 15 days.    flash glucose scanning reader (FREESTYLE JULIO 2 READER) Misc Use as instructed for blood glucose monitoring.    flash glucose sensor (FREESTYLE JULIO 14 DAY SENSOR) Kit Inject 1 each into the skin.    FREESTYLE JULIO 2 SENSOR Kit CHANGE EVERY 14 DAYS FOR BLOOD GLUCOSE MONITORING    FREESTYLE JULIO 3 READER Misc 1 Device by Misc.(Non-Drug; Combo Route) route once. for 1 dose    hydrocodone-acetaminophen 10-325mg (NORCO)  mg Tab Take 1 tablet by mouth 2 (two) times a day.    ibuprofen (ADVIL,MOTRIN) 600 MG tablet Take 1 tablet (600 mg total) by mouth every 6 (six) hours as needed for Pain.    insulin glargine U-100, Lantus, 100 unit/mL (3 mL) SubQ InPn pen Inject 20 Units into the skin once daily.    insulin syringe-needle U-100 1 mL 31 gauge x 5/16 Syrg     LEVEMIR FLEXTOUCH U-100 INSULN 100 unit/mL (3 mL) InPn pen Inject 35 Units into the skin 2 (two) times daily.    meclizine (ANTIVERT) 50 MG tablet Take 50 mg by mouth 2 (two) times daily.    minocycline (MINOCIN,DYNACIN) 100 MG capsule Take 1 capsule (100 mg total) by mouth 2 (two) times a day.    naproxen (NAPROSYN) 500 MG tablet Take 1 tablet (500 mg total) by mouth 2 (two) times daily with meals.    nicotine (NICODERM CQ) 21 mg/24 hr Place 1 patch onto the skin once daily.    NOVOLOG FLEXPEN U-100 INSULIN 100 unit/mL (3 mL) InPn pen Inject 10 Units into the skin 3 (three) times daily with meals.    TRUE METRIX GLUCOSE TEST STRIP Strp 1 strip 4 (four) times daily. "    TRUEPLUS LANCETS 33 gauge Misc USE TO TEST BLOOD SUGAR TWO TIMES DAILY     Family History       Problem Relation (Age of Onset)    Alzheimer's disease Mother    Cancer Father          Tobacco Use    Smoking status: Every Day     Current packs/day: 2.50     Average packs/day: 2.5 packs/day for 36.1 years (90.3 ttl pk-yrs)     Types: Cigarettes     Start date: 1989    Smokeless tobacco: Never    Tobacco comments:     Tobacco cessation   Substance and Sexual Activity    Alcohol use: No     Alcohol/week: 0.0 standard drinks of alcohol    Drug use: No     Comment: 30YEARS AGO, PATIENT ON PRESCRIBED PAIN MEDICATION.     Sexual activity: Not Currently     Review of Systems   Constitutional:  Negative for chills and fever.   HENT:  Negative for ear discharge and ear pain.    Eyes:  Positive for visual disturbance. Negative for pain and discharge.   Respiratory:  Negative for cough and shortness of breath.    Cardiovascular:  Negative for chest pain and leg swelling.   Gastrointestinal:  Negative for nausea and vomiting.   Endocrine: Negative for cold intolerance and heat intolerance.   Genitourinary:  Negative for difficulty urinating and dysuria.   Musculoskeletal:  Negative for joint swelling and myalgias.   Skin:  Negative for rash and wound.   Neurological:  Negative for dizziness and headaches.   Psychiatric/Behavioral:  Negative for agitation and confusion.      Objective:     Vital Signs (Most Recent):  Temp: 97.6 °F (36.4 °C) (02/12/25 1130)  Pulse: 67 (02/12/25 1130)  Resp: 20 (02/12/25 1130)  BP: 122/70 (02/12/25 1130)  SpO2: 96 % (02/12/25 1130) Vital Signs (24h Range):  Temp:  [97.6 °F (36.4 °C)-98.6 °F (37 °C)] 97.6 °F (36.4 °C)  Pulse:  [56-90] 67  Resp:  [16-20] 20  SpO2:  [94 %-97 %] 96 %  BP: (120-145)/(70-82) 122/70     Weight: 68.9 kg (151 lb 14.4 oz)  Body mass index is 26.91 kg/m².     Physical Exam  Constitutional:       General: He is not in acute distress.  HENT:      Head: Normocephalic and  "atraumatic.   Eyes:      General:         Right eye: No discharge.         Left eye: No discharge.   Cardiovascular:      Rate and Rhythm: Normal rate and regular rhythm.   Pulmonary:      Effort: Pulmonary effort is normal.      Breath sounds: Normal breath sounds.   Abdominal:      General: There is no distension.      Tenderness: There is no abdominal tenderness.   Musculoskeletal:         General: No swelling or tenderness.      Cervical back: Neck supple. No tenderness.   Skin:     General: Skin is warm and dry.   Neurological:      General: No focal deficit present.      Mental Status: He is alert and oriented to person, place, and time.   Psychiatric:         Mood and Affect: Mood normal.         Behavior: Behavior normal.                Significant Labs: A1C:   Recent Labs   Lab 10/03/24  0904 02/11/25  1830   HGBA1C 8.0* 7.5*     ABGs: No results for input(s): "PH", "PCO2", "HCO3", "POCSATURATED", "BE", "TOTALHB", "COHB", "METHB", "O2HB", "POCFIO2", "PO2" in the last 48 hours.  Bilirubin:   Recent Labs   Lab 02/11/25  1830   BILITOT 0.4     Blood Culture: No results for input(s): "LABBLOO" in the last 48 hours.  BMP:   Recent Labs   Lab 02/11/25  1830   *      K 4.0      CO2 21*   BUN 25*   CREATININE 0.8   CALCIUM 9.3     CBC:   Recent Labs   Lab 02/11/25  1830   WBC 10.22   HGB 15.4   HCT 45.5        CMP:   Recent Labs   Lab 02/11/25  1830      K 4.0      CO2 21*   *   BUN 25*   CREATININE 0.8   CALCIUM 9.3   PROT 7.4   ALBUMIN 4.0   BILITOT 0.4   ALKPHOS 76   AST 16   ALT 22   ANIONGAP 10     Cardiac Markers: No results for input(s): "CKMB", "MYOGLOBIN", "BNP", "TROPISTAT" in the last 48 hours.  Coagulation:   Recent Labs   Lab 02/11/25  1830   INR 0.9     Lactic Acid: No results for input(s): "LACTATE" in the last 48 hours.  Lipase: No results for input(s): "LIPASE" in the last 48 hours.  Lipid Panel:   Recent Labs   Lab 02/11/25  1830   CHOL 188   HDL 59 " "  LDLCALC 109.8   TRIG 96   CHOLHDL 31.4     Magnesium: No results for input(s): "MG" in the last 48 hours.  POCT Glucose:   Recent Labs   Lab 02/11/25  1650 02/11/25  1850 02/12/25  0648   POCTGLUCOSE 229* 125* 196*     Prealbumin: No results for input(s): "PREALBUMIN" in the last 48 hours.  Respiratory Culture: No results for input(s): "GSRESP", "RESPIRATORYC" in the last 48 hours.  Troponin:   Recent Labs   Lab 02/11/25  1830   TROPONINI <0.006     TSH:   Recent Labs   Lab 02/11/25  1830   TSH 3.765     Urine Culture: No results for input(s): "LABURIN" in the last 48 hours.  Urine Studies: No results for input(s): "COLORU", "APPEARANCEUA", "PHUR", "SPECGRAV", "PROTEINUA", "GLUCUA", "KETONESU", "BILIRUBINUA", "OCCULTUA", "NITRITE", "UROBILINOGEN", "LEUKOCYTESUR", "RBCUA", "WBCUA", "BACTERIA", "SQUAMEPITHEL", "HYALINECASTS" in the last 48 hours.    Invalid input(s): "WRIGHTSUR"    Significant Imaging: I have reviewed all pertinent imaging results/findings within the past 24 hours.  "

## 2025-02-12 NOTE — NURSING
Bed side swallow completed and passed. NPO order d/c'd and diabetic diet ordered per Dr. Andersen. Medications given per MAR. Long acting insulin held. Pt states he normally takes it in the AM. Dr. Andersen was okay with holding. Blood sugar WDL at this time. Food tray delivered to pt. Pt ambulated to bathroom. Pt free from falls. Call bell within reach.

## 2025-02-12 NOTE — CARE UPDATE
"Offered to make patient an optometrist appointment. Patient declined. States "I will think about that. I went to one two days this week and they sent me here." Pamphlet  with case management information has been provided.          "

## 2025-02-12 NOTE — ASSESSMENT & PLAN NOTE
Patient's FSGs are controlled on current medication regimen.  Last A1c reviewed-   Lab Results   Component Value Date    HGBA1C 7.5 (H) 02/11/2025     Most recent fingerstick glucose reviewed-   Recent Labs   Lab 02/11/25  1650 02/11/25  1850 02/12/25  0648   POCTGLUCOSE 229* 125* 196*     Current correctional scale  Low  Maintain anti-hyperglycemic dose as follows-   Antihyperglycemics (From admission, onward)      Start     Stop Route Frequency Ordered    02/12/25 0715  insulin aspart U-100 pen 6 Units         -- SubQ 3 times daily with meals 02/11/25 1924 02/11/25 2100  insulin glargine U-100 (Lantus) pen 15 Units         -- SubQ Nightly 02/11/25 1924          Hold Oral hypoglycemics while patient is in the hospital.

## 2025-02-12 NOTE — HPI
"Patient is a 59 year old male with medical history of tobacco use disorder, HLD, DM type 2 and BPH who presented to the ED with double vision of right eye.  He has had double vision on his left side in the past.  Symptoms are intermittent but last several hours.  He describes seeing double clocks.  He went to an "eye doctor" and recommend that a stroke was ruled out.  He denies fever, chills, CP, SOB, nausea and vomiting.      Admitted for diplopia.  Currently resolved.   "

## 2025-02-12 NOTE — ASSESSMENT & PLAN NOTE
MRI negative for stroke  MRA brain/neck No significant stenosis compromising cerebral blood flow   Continue Aspirin and statin  Recommend following up with an ophthalmologist and not an optometrist.

## 2025-02-12 NOTE — H&P
"  [facility] ([address] [place] [address] Medicine  History & Physical    Patient Name: Ross Crystal  MRN: [id number]  Patient Class: OP- Observation  Admission Date: 2/11/2025  Attending Physician: Kristopher Andersen MD   Primary Care Provider: Ross Valverde MD         Patient information was obtained from patient and ER records.     Subjective:     Principal Problem:Diplopia    Chief Complaint:   Chief Complaint   Patient presents with    Blurred Vision     Pt arrived to ED c/o right sided blurry vision and right sided headache that started 3-4 days ago. Slight facial droop to right side. Last known normal was 2/06/25. Pt reports he was seen by the eye doctor who believes he may have had a stroke. Pt AAOX4, pt following commands, normal strength noted.         HPI: Patient is a 59 year old male with medical history of tobacco use disorder, HLD, DM type 2 and BPH who presented to the ED with double vision of right eye.  He has had double vision on his left side in the past.  Symptoms are intermittent but last several hours.  He describes seeing double clocks.  He went to an "eye doctor" and recommend that a stroke was ruled out.  He denies fever, chills, CP, SOB, nausea and vomiting.      Admitted for diplopia.  Currently resolved.     Past Medical History:   Diagnosis Date    Benign paroxysmal vertigo, bilateral     Diabetes mellitus     Diabetes mellitus type I     Hyperlipemia     Renal disorder     Urinary tract infection        Past Surgical History:   Procedure Laterality Date    CARPAL TUNNEL RELEASE  25 YEARS AGO    COLONOSCOPY      COLONOSCOPY N/A 1/25/2016    Procedure: COLONOSCOPY;  Surgeon: Luis Bogran-Reyes, MD;  Location: [facility];  Service: Endoscopy;  Laterality: N/A;    DEBRIDEMENT, PHALANX, HAND Left 12/29/2022    Procedure: DEBRIDEMENT, PHALANX, HAND;  Surgeon: Abdon Reid Jr., MD;  Location: [facility] OR;  Service: Orthopedics;  Laterality: Left;    DEBRIDEMENT, PHALANX, HAND Left 2/20/2024    " "Procedure: DEBRIDEMENT, PHALANX, HAND;  Surgeon: Abdon Reid Jr., MD;  Location: [facility] OR;  Service: Orthopedics;  Laterality: Left;    ESOPHAGOGASTRODUODENOSCOPY      HERNIA REPAIR      HERNIA REPAIR         Review of patient's allergies indicates:   Allergen Reactions    Sulfamethoxazole      Other Reaction(s): hives rash    Trimethoprim      Other Reaction(s): hives rash    Bactrim [sulfamethoxazole-trimethoprim] Rash       No current facility-administered medications on file prior to encounter.     Current Outpatient Medications on File Prior to Encounter   Medication Sig    aspirin (ECOTRIN) 81 MG EC tablet Take 81 mg by mouth once daily.    atorvastatin (LIPITOR) 80 MG tablet Take 80 mg by mouth.    empagliflozin (JARDIANCE) 25 mg tablet Take 1 tablet (25 mg total) by mouth once daily.    finasteride (PROSCAR) 5 mg tablet Take 1 tablet (5 mg total) by mouth once daily.    fish oil-omega-3 fatty acids 300-1,000 mg capsule Take 1 capsule by mouth 2 (two) times a day.    HYDROcodone-acetaminophen (NORCO) 5-325 mg per tablet Take 1 tablet by mouth every 4 (four) hours as needed for Pain.    meloxicam (MOBIC) 15 MG tablet Take 15 mg by mouth.    pravastatin (PRAVACHOL) 40 MG tablet Take 40 mg by mouth every evening.    tamsulosin (FLOMAX) 0.4 mg Cap Take 0.4 mg by mouth once daily.    tamsulosin (FLOMAX) 0.4 mg Cap Take 1 capsule (0.4 mg total) by mouth 2 (two) times a day.    amoxicillin-clavulanate 875-125mg (AUGMENTIN) 875-125 mg per tablet Take 1 tablet by mouth 2 (two) times a day.    BAQSIMI 3 mg/actuation Chandler 3 mg by Nasal route.    BD ULTRA-FINE JENNY PEN NEEDLE 32 gauge x 5/32" Ndle 4 (four) times daily.    blood-glucose sensor (FREESTYLE JULIO 3 PLUS SENSOR) Sadaf To use for glucose monitoring every 15 days.    flash glucose scanning reader (FREESTYLE JULIO 2 READER) Misc Use as instructed for blood glucose monitoring.    flash glucose sensor (FREESTYLE JULIO 14 DAY SENSOR) Kit Inject 1 each into the " skin.    FREESTYLE JULIO 2 SENSOR Kit CHANGE EVERY 14 DAYS FOR BLOOD GLUCOSE MONITORING    FREESTYLE JULIO 3 READER Misc 1 Device by Misc.(Non-Drug; Combo Route) route once. for 1 dose    hydrocodone-acetaminophen 10-325mg (NORCO)  mg Tab Take 1 tablet by mouth 2 (two) times a day.    ibuprofen (ADVIL,MOTRIN) 600 MG tablet Take 1 tablet (600 mg total) by mouth every 6 (six) hours as needed for Pain.    insulin glargine U-100, Lantus, 100 unit/mL (3 mL) SubQ InPn pen Inject 20 Units into the skin once daily.    insulin syringe-needle U-100 1 mL 31 gauge x 5/16 Syrg     LEVEMIR FLEXTOUCH U-100 INSULN 100 unit/mL (3 mL) InPn pen Inject 35 Units into the skin 2 (two) times daily.    meclizine (ANTIVERT) 50 MG tablet Take 50 mg by mouth 2 (two) times daily.    minocycline (MINOCIN,DYNACIN) 100 MG capsule Take 1 capsule (100 mg total) by mouth 2 (two) times a day.    naproxen (NAPROSYN) 500 MG tablet Take 1 tablet (500 mg total) by mouth 2 (two) times daily with meals.    nicotine (NICODERM CQ) 21 mg/24 hr Place 1 patch onto the skin once daily.    NOVOLOG FLEXPEN U-100 INSULIN 100 unit/mL (3 mL) InPn pen Inject 10 Units into the skin 3 (three) times daily with meals.    TRUE METRIX GLUCOSE TEST STRIP Strp 1 strip 4 (four) times daily.    TRUEPLUS LANCETS 33 gauge Misc USE TO TEST BLOOD SUGAR TWO TIMES DAILY     Family History       Problem Relation (Age of Onset)    Alzheimer's disease Mother    Cancer Father          Tobacco Use    Smoking status: Every Day     Current packs/day: 2.50     Average packs/day: 2.5 packs/day for 36.1 years (90.3 ttl pk-yrs)     Types: Cigarettes     Start date: 1989    Smokeless tobacco: Never    Tobacco comments:     Tobacco cessation   Substance and Sexual Activity    Alcohol use: No     Alcohol/week: 0.0 standard drinks of alcohol    Drug use: No     Comment: 30YEARS AGO, PATIENT ON PRESCRIBED PAIN MEDICATION.     Sexual activity: Not Currently     Review of Systems    Constitutional:  Negative for chills and fever.   HENT:  Negative for ear discharge and ear pain.    Eyes:  Positive for visual disturbance. Negative for pain and discharge.   Respiratory:  Negative for cough and shortness of breath.    Cardiovascular:  Negative for chest pain and leg swelling.   Gastrointestinal:  Negative for nausea and vomiting.   Endocrine: Negative for cold intolerance and heat intolerance.   Genitourinary:  Negative for difficulty urinating and dysuria.   Musculoskeletal:  Negative for joint swelling and myalgias.   Skin:  Negative for rash and wound.   Neurological:  Negative for dizziness and headaches.   Psychiatric/Behavioral:  Negative for agitation and confusion.      Objective:     Vital Signs (Most Recent):  Temp: 97.6 °F (36.4 °C) (02/12/25 1130)  Pulse: 67 (02/12/25 1130)  Resp: 20 (02/12/25 1130)  BP: 122/70 (02/12/25 1130)  SpO2: 96 % (02/12/25 1130) Vital Signs (24h Range):  Temp:  [97.6 °F (36.4 °C)-98.6 °F (37 °C)] 97.6 °F (36.4 °C)  Pulse:  [56-90] 67  Resp:  [16-20] 20  SpO2:  [94 %-97 %] 96 %  BP: (120-145)/(70-82) 122/70     Weight: 68.9 kg (151 lb 14.4 oz)  Body mass index is 26.91 kg/m².     Physical Exam  Constitutional:       General: He is not in acute distress.  HENT:      Head: Normocephalic and atraumatic.   Eyes:      General:         Right eye: No discharge.         Left eye: No discharge.   Cardiovascular:      Rate and Rhythm: Normal rate and regular rhythm.   Pulmonary:      Effort: Pulmonary effort is normal.      Breath sounds: Normal breath sounds.   Abdominal:      General: There is no distension.      Tenderness: There is no abdominal tenderness.   Musculoskeletal:         General: No swelling or tenderness.      Cervical back: Neck supple. No tenderness.   Skin:     General: Skin is warm and dry.   Neurological:      General: No focal deficit present.      Mental Status: He is alert and oriented to person, place, and time.   Psychiatric:         Mood and  "Affect: Mood normal.         Behavior: Behavior normal.                Significant Labs: A1C:   Recent Labs   Lab 10/03/24  0904 02/11/25  1830   HGBA1C 8.0* 7.5*     ABGs: No results for input(s): "PH", "PCO2", "HCO3", "POCSATURATED", "BE", "TOTALHB", "COHB", "METHB", "O2HB", "POCFIO2", "PO2" in the last 48 hours.  Bilirubin:   Recent Labs   Lab 02/11/25  1830   BILITOT 0.4     Blood Culture: No results for input(s): "LABBLOO" in the last 48 hours.  BMP:   Recent Labs   Lab 02/11/25  1830   *      K 4.0      CO2 21*   BUN 25*   CREATININE 0.8   CALCIUM 9.3     CBC:   Recent Labs   Lab 02/11/25  1830   WBC 10.22   HGB 15.4   HCT 45.5        CMP:   Recent Labs   Lab 02/11/25  1830      K 4.0      CO2 21*   *   BUN 25*   CREATININE 0.8   CALCIUM 9.3   PROT 7.4   ALBUMIN 4.0   BILITOT 0.4   ALKPHOS 76   AST 16   ALT 22   ANIONGAP 10     Cardiac Markers: No results for input(s): "CKMB", "MYOGLOBIN", "BNP", "TROPISTAT" in the last 48 hours.  Coagulation:   Recent Labs   Lab 02/11/25  1830   INR 0.9     Lactic Acid: No results for input(s): "LACTATE" in the last 48 hours.  Lipase: No results for input(s): "LIPASE" in the last 48 hours.  Lipid Panel:   Recent Labs   Lab 02/11/25  1830   CHOL 188   HDL 59   LDLCALC 109.8   TRIG 96   CHOLHDL 31.4     Magnesium: No results for input(s): "MG" in the last 48 hours.  POCT Glucose:   Recent Labs   Lab 02/11/25  1650 02/11/25  1850 02/12/25  0648   POCTGLUCOSE 229* 125* 196*     Prealbumin: No results for input(s): "PREALBUMIN" in the last 48 hours.  Respiratory Culture: No results for input(s): "GSRESP", "RESPIRATORYC" in the last 48 hours.  Troponin:   Recent Labs   Lab 02/11/25  1830   TROPONINI <0.006     TSH:   Recent Labs   Lab 02/11/25 1830   TSH 3.765     Urine Culture: No results for input(s): "LABURIN" in the last 48 hours.  Urine Studies: No results for input(s): "COLORU", "APPEARANCEUA", "PHUR", "SPECGRAV", "PROTEINUA", " ""GLUCUA", "KETONESU", "BILIRUBINUA", "OCCULTUA", "NITRITE", "UROBILINOGEN", "LEUKOCYTESUR", "RBCUA", "WBCUA", "BACTERIA", "SQUAMEPITHEL", "HYALINECASTS" in the last 48 hours.    Invalid input(s): "WRIGHTSUR"    Significant Imaging: I have reviewed all pertinent imaging results/findings within the past 24 hours.  Assessment/Plan:     * Diplopia  MRI negative for stroke  MRA brain/neck No significant stenosis compromising cerebral blood flow   Continue Aspirin and statin  Recommend following up with an ophthalmologist and not an optometrist.       BPH (benign prostatic hyperplasia)  Resume home medications      Type 2 diabetes mellitus without complication  Patient's FSGs are controlled on current medication regimen.  Last A1c reviewed-   Lab Results   Component Value Date    HGBA1C 7.5 (H) 02/11/2025     Most recent fingerstick glucose reviewed-   Recent Labs   Lab 02/11/25  1650 02/11/25  1850 02/12/25  0648   POCTGLUCOSE 229* 125* 196*     Current correctional scale  Low  Maintain anti-hyperglycemic dose as follows-   Antihyperglycemics (From admission, onward)      Start     Stop Route Frequency Ordered    02/12/25 0715  insulin aspart U-100 pen 6 Units         -- SubQ 3 times daily with meals 02/11/25 1924 02/11/25 2100  insulin glargine U-100 (Lantus) pen 15 Units         -- SubQ Nightly 02/11/25 1924          Hold Oral hypoglycemics while patient is in the hospital.    Tobacco abuse  Recommend smoking cessation   Nicotine patch       Mixed hyperlipidemia  Continue home statin         VTE Risk Mitigation (From admission, onward)           Ordered     IP VTE HIGH RISK PATIENT  Once         02/11/25 1842     Place sequential compression device  Until discontinued         02/11/25 1842                         On 02/12/2025, patient should be placed in hospital observation services under my care in collaboration with Dr. Erwin.           Sherie Lezama PA-C  Department of [facility] Medicine  [place] - " Med Surg (3rd Fl)

## 2025-02-13 LAB
OHS QRS DURATION: 92 MS
OHS QRS DURATION: 96 MS
OHS QTC CALCULATION: 419 MS
OHS QTC CALCULATION: 425 MS

## 2025-02-14 ENCOUNTER — OFFICE VISIT (OUTPATIENT)
Dept: ENDOCRINOLOGY | Facility: CLINIC | Age: 60
End: 2025-02-14
Payer: MEDICAID

## 2025-02-14 VITALS
HEIGHT: 63 IN | HEART RATE: 91 BPM | SYSTOLIC BLOOD PRESSURE: 134 MMHG | RESPIRATION RATE: 18 BRPM | BODY MASS INDEX: 26.34 KG/M2 | DIASTOLIC BLOOD PRESSURE: 64 MMHG | OXYGEN SATURATION: 99 % | WEIGHT: 148.63 LBS

## 2025-02-14 DIAGNOSIS — E11.9 TYPE 2 DIABETES MELLITUS WITHOUT COMPLICATION, UNSPECIFIED WHETHER LONG TERM INSULIN USE: Primary | ICD-10-CM

## 2025-02-14 DIAGNOSIS — E78.2 MIXED HYPERLIPIDEMIA: ICD-10-CM

## 2025-02-14 DIAGNOSIS — I10 ESSENTIAL HYPERTENSION: ICD-10-CM

## 2025-02-14 PROCEDURE — 99214 OFFICE O/P EST MOD 30 MIN: CPT | Mod: PBBFAC | Performed by: PHYSICIAN ASSISTANT

## 2025-02-14 PROCEDURE — 99999 PR PBB SHADOW E&M-EST. PATIENT-LVL IV: CPT | Mod: PBBFAC,,, | Performed by: PHYSICIAN ASSISTANT

## 2025-02-14 NOTE — PROGRESS NOTES
Subjective:      Patient ID: Ross Crystal is a 59 y.o. male.    Chief Complaint:  T2DM    History of Present Illness  This is a 59 y.o. male. with a past medical history of T2DM, HTN, HLD, Neuropathy here for evaluation of T2DM.    Type 2 diabetes mellitus  Diagnosed around age 47    Current diabetes medications:  - Lantus 12-14 units once daily in AM   - Novolog 6-7 units ACTID  - Jardiance 25 mg once daily     - Freestyle Libre2 ()    Past diabetes medications:  - Metformin - failure   - Glipizide- failure   - Farxiga intolerance, but he does not remember what (Happened 10 years ago)    Lab Results   Component Value Date    CREATININE 0.8 02/11/2025    EGFRNORACEVR >60 02/11/2025     Known diabetic complications: peripheral neuropathy    Weight trend:  Wt Readings from Last 8 Encounters:   02/14/25 67.4 kg (148 lb 9.6 oz)   02/11/25 68.9 kg (151 lb 14.4 oz)   02/06/25 67.5 kg (148 lb 13 oz)   01/08/25 67.9 kg (149 lb 11.1 oz)   12/31/24 67.2 kg (148 lb 1.6 oz)   11/19/24 67.3 kg (148 lb 4.8 oz)   10/15/24 69.7 kg (153 lb 11.2 oz)   10/11/24 71.2 kg (156 lb 13.7 oz)     Family history of diabetes:  Brother    Prior visit with diabetes education: No    Current diet: 3-4 meals per day  Current exercise: He is very active at work. He is a  at work     Blood glucose monitoring at home: Libre2 ()         Glucagon: Yes    Diabetes Management Status  Statin: Taking  ACE/ARB: Not taking    Screening or Prevention Patient's value   HgA1C Testing and Control   Lab Results   Component Value Date    HGBA1C 7.5 (H) 02/11/2025        LDL control Lab Results   Component Value Date    LDLCALC 109.8 02/11/2025      Nephropathy screening Lab Results   Component Value Date    MICALBCREAT 4.5 10/03/2024        Lab Results   Component Value Date    TSH 3.765 02/11/2025     Last eye exam: Most Recent Eye Exam Date: Not Found    Review of Systems  As above    Social and family history reviewed  Current  "medications and allergies reviewed    Objective:   /64   Pulse 91   Resp 18   Ht 5' 3" (1.6 m)   Wt 67.4 kg (148 lb 9.6 oz)   SpO2 99%   BMI 26.32 kg/m²   Physical Exam  Alert, oriented    BP Readings from Last 1 Encounters:   02/14/25 134/64      Wt Readings from Last 1 Encounters:   02/14/25 1025 67.4 kg (148 lb 9.6 oz)     Body mass index is 26.32 kg/m².    Lab Review:   Lab Results   Component Value Date    HGBA1C 7.5 (H) 02/11/2025     Lab Results   Component Value Date    CHOL 188 02/11/2025    HDL 59 02/11/2025    LDLCALC 109.8 02/11/2025    TRIG 96 02/11/2025    CHOLHDL 31.4 02/11/2025     Lab Results   Component Value Date     02/11/2025    K 4.0 02/11/2025     02/11/2025    CO2 21 (L) 02/11/2025     (H) 02/11/2025    BUN 25 (H) 02/11/2025    CREATININE 0.8 02/11/2025    CALCIUM 9.3 02/11/2025    PROT 7.4 02/11/2025    ALBUMIN 4.0 02/11/2025    BILITOT 0.4 02/11/2025    ALKPHOS 76 02/11/2025    AST 16 02/11/2025    ALT 22 02/11/2025    ANIONGAP 10 02/11/2025    ESTGFRAFRICA >60.0 07/29/2022    EGFRNONAA >60.0 07/29/2022    TSH 3.765 02/11/2025     All pertinent labs reviewed    Assessment and Plan     Type 2 diabetes mellitus without complication  Recent A1c of 7.5% which is improved. CGM data reviewed with TIR of 63%. No hypoglycemia. He was recently in the hospital due to diplopia and had to have multiple MRIs which were all normal. He has follow up with ophthalmology soon. He states they were not giving him enough insulin in the hospital. Will continue same regimen     His c-peptide was low. +ANTHONY, but negative islet cell antibodies. He is tolerating Jardiance. Could also consider starting Metformin in the future. No CKD.     Plan   - Continue Lantus 14 U once daily   - Continue Novolog 6 U ACTID  - Continue Jardiance 25 mg once daily   - Continue Libre3 ()    Follow up 2 months     Mixed hyperlipidemia  Continue statin    Essential hypertension  He has been able to " discontinue all BP medications. BP controlled in clinic. Normal UACR. Can stay off ACEi/ARB for now.     Iesha Freire PA-C  Endocrinology

## 2025-02-14 NOTE — ASSESSMENT & PLAN NOTE
Recent A1c of 7.5% which is improved. CGM data reviewed with TIR of 63%. No hypoglycemia. He was recently in the hospital due to diplopia and had to have multiple MRIs which were all normal. He has follow up with ophthalmology soon. He states they were not giving him enough insulin in the hospital. Will continue same regimen     His c-peptide was low. +ANTHONY, but negative islet cell antibodies. He is tolerating Jardiance. Could also consider starting Metformin in the future. No CKD.     Plan   - Continue Lantus 14 U once daily   - Continue Novolog 6 U ACTID  - Continue Jardiance 25 mg once daily   - Continue Libre3 ()    Follow up 2 months

## 2025-02-18 ENCOUNTER — TELEPHONE (OUTPATIENT)
Dept: ORTHOPEDICS | Facility: CLINIC | Age: 60
End: 2025-02-18
Payer: MEDICAID

## 2025-02-18 NOTE — TELEPHONE ENCOUNTER
Patient was called in order to reschedule their appointment. Patient did not answer and their voicemail box was full

## 2025-02-27 ENCOUNTER — TELEPHONE (OUTPATIENT)
Dept: ENDOCRINOLOGY | Facility: CLINIC | Age: 60
End: 2025-02-27
Payer: MEDICAID

## 2025-02-27 NOTE — TELEPHONE ENCOUNTER
----- Message from Sherie sent at 2/27/2025  1:59 PM CST -----  Contact: patient  Ross CrystalMRN: [id number]: 1965PCP: Ross Valverde.Home Phone      Not on file.Work Phone      Not on file.Mobile          [phone]: Patient states he had an MRI which he had to remove his sensor and he will not be receiving more until the 3rd or 4th of March.  Please advisePhone:[phone]

## 2025-04-23 NOTE — ASSESSMENT & PLAN NOTE
Recent A1c of 7.5%. CGM data reviewed with TIR of 68%. No hypoglycemia. He has varying prandial hyperglycemia depending on intake. He admits to eating a lot of Easter candy recently. Will improve diet.     His c-peptide was low. +ANTHONY, but negative islet cell antibodies. He is tolerating Jardiance. Could also consider starting Metformin in the future. No CKD.     Plan   - Continue Lantus 14 U once daily   - Continue Novolog 6 U ACTID  - Continue Jardiance 25 mg once daily   - Continue Libre3 ()    Follow up 4 months with fasting labs

## 2025-04-23 NOTE — PROGRESS NOTES
Subjective:      Patient ID: Ross Crystal is a 59 y.o. male.    Chief Complaint:  T2DM    History of Present Illness  This is a 59 y.o. male. with a past medical history of T2DM, HTN, HLD, Neuropathy here for evaluation of T2DM.    Type 2 diabetes mellitus  Diagnosed around age 47    Current diabetes medications:  - Lantus 12-14 units once daily in AM   - Novolog 6-7 units ACTID  - Jardiance 25 mg once daily     - Freestyle Libre2 ()    Past diabetes medications:  - Metformin - failure   - Glipizide- failure   - Farxiga intolerance, but he does not remember what (Happened 10 years ago)    Lab Results   Component Value Date    CREATININE 0.8 02/11/2025    EGFRNORACEVR >60 02/11/2025     Known diabetic complications: peripheral neuropathy    Weight trend:  Wt Readings from Last 8 Encounters:   04/25/25 69.9 kg (154 lb 3.2 oz)   02/14/25 67.4 kg (148 lb 9.6 oz)   02/11/25 68.9 kg (151 lb 14.4 oz)   02/06/25 67.5 kg (148 lb 13 oz)   01/08/25 67.9 kg (149 lb 11.1 oz)   12/31/24 67.2 kg (148 lb 1.6 oz)   11/19/24 67.3 kg (148 lb 4.8 oz)   10/15/24 69.7 kg (153 lb 11.2 oz)     Family history of diabetes:  Brother    Prior visit with diabetes education: No    Current diet: 3-4 meals per day  Current exercise: He is very active at work. He is a  at work     Blood glucose monitoring at home: Libre2 ()         Glucagon: Yes    Diabetes Management Status  Statin: Taking  ACE/ARB: Not taking    Screening or Prevention Patient's value   HgA1C Testing and Control   Lab Results   Component Value Date    HGBA1C 7.5 (H) 02/11/2025        LDL control Lab Results   Component Value Date    LDLCALC 109.8 02/11/2025      Nephropathy screening Lab Results   Component Value Date    MICALBCREAT 4.5 10/03/2024        Lab Results   Component Value Date    TSH 3.765 02/11/2025     Last eye exam: Most Recent Eye Exam Date: Not Found    Review of Systems  As above    Social and family history reviewed  Current  "medications and allergies reviewed    Objective:   /78   Ht 5' 3" (1.6 m)   Wt 69.9 kg (154 lb 3.2 oz)   BMI 27.32 kg/m²   Physical Exam  Alert, oriented    BP Readings from Last 1 Encounters:   04/25/25 110/78      Wt Readings from Last 1 Encounters:   04/25/25 1021 69.9 kg (154 lb 3.2 oz)     Body mass index is 27.32 kg/m².    Lab Review:   Lab Results   Component Value Date    HGBA1C 7.5 (H) 02/11/2025     Lab Results   Component Value Date    CHOL 188 02/11/2025    HDL 59 02/11/2025    LDLCALC 109.8 02/11/2025    TRIG 96 02/11/2025    CHOLHDL 31.4 02/11/2025     Lab Results   Component Value Date     02/11/2025    K 4.0 02/11/2025     02/11/2025    CO2 21 (L) 02/11/2025     (H) 02/11/2025    BUN 25 (H) 02/11/2025    CREATININE 0.8 02/11/2025    CALCIUM 9.3 02/11/2025    PROT 7.4 02/11/2025    ALBUMIN 4.0 02/11/2025    BILITOT 0.4 02/11/2025    ALKPHOS 76 02/11/2025    AST 16 02/11/2025    ALT 22 02/11/2025    ANIONGAP 10 02/11/2025    ESTGFRAFRICA >60.0 07/29/2022    EGFRNONAA >60.0 07/29/2022    TSH 3.765 02/11/2025     All pertinent labs reviewed    Assessment and Plan     Type 2 diabetes mellitus without complication  Recent A1c of 7.5%. CGM data reviewed with TIR of 68%. No hypoglycemia. He has varying prandial hyperglycemia depending on intake. He admits to eating a lot of Easter candy recently. Will improve diet.     His c-peptide was low. +ANTHONY, but negative islet cell antibodies. He is tolerating Jardiance. Could also consider starting Metformin in the future. No CKD.     Plan   - Continue Lantus 14 U once daily   - Continue Novolog 6 U ACTID  - Continue Jardiance 25 mg once daily   - Continue Libre3 ()    Follow up 4 months with fasting labs    Mixed hyperlipidemia  Continue statin    Essential hypertension  He has been able to discontinue all BP medications. BP controlled in clinic. Normal UACR. Can stay off ACEi/ARB for now.     Iesha Freire PA-C  Endocrinology "

## 2025-04-25 ENCOUNTER — OFFICE VISIT (OUTPATIENT)
Dept: ENDOCRINOLOGY | Facility: CLINIC | Age: 60
End: 2025-04-25
Payer: MEDICAID

## 2025-04-25 VITALS
HEIGHT: 63 IN | DIASTOLIC BLOOD PRESSURE: 78 MMHG | SYSTOLIC BLOOD PRESSURE: 110 MMHG | BODY MASS INDEX: 27.32 KG/M2 | WEIGHT: 154.19 LBS

## 2025-04-25 DIAGNOSIS — E78.2 MIXED HYPERLIPIDEMIA: ICD-10-CM

## 2025-04-25 DIAGNOSIS — E11.9 TYPE 2 DIABETES MELLITUS WITHOUT COMPLICATION, UNSPECIFIED WHETHER LONG TERM INSULIN USE: Primary | ICD-10-CM

## 2025-04-25 DIAGNOSIS — I10 ESSENTIAL HYPERTENSION: ICD-10-CM

## 2025-04-25 PROCEDURE — 99213 OFFICE O/P EST LOW 20 MIN: CPT | Mod: PBBFAC | Performed by: PHYSICIAN ASSISTANT

## 2025-04-25 PROCEDURE — 99999 PR PBB SHADOW E&M-EST. PATIENT-LVL III: CPT | Mod: PBBFAC,,, | Performed by: PHYSICIAN ASSISTANT

## 2025-04-25 RX ORDER — PEN NEEDLE, DIABETIC 31 GX5/16"
NEEDLE, DISPOSABLE MISCELLANEOUS
Qty: 100 EACH | Refills: 11 | Status: SHIPPED | OUTPATIENT
Start: 2025-04-25

## 2025-04-25 RX ORDER — CYCLOBENZAPRINE HCL 10 MG
10 TABLET ORAL
COMMUNITY
Start: 2025-03-05

## 2025-04-25 RX ORDER — INSULIN GLARGINE 100 [IU]/ML
14 INJECTION, SOLUTION SUBCUTANEOUS DAILY
Qty: 6 ML | Refills: 11 | Status: SHIPPED | OUTPATIENT
Start: 2025-04-25

## 2025-04-25 RX ORDER — INSULIN ASPART 100 [IU]/ML
7 INJECTION, SOLUTION INTRAVENOUS; SUBCUTANEOUS
Qty: 9 ML | Refills: 11 | Status: SHIPPED | OUTPATIENT
Start: 2025-04-25

## 2025-05-27 ENCOUNTER — CLINICAL SUPPORT (OUTPATIENT)
Dept: SMOKING CESSATION | Facility: CLINIC | Age: 60
End: 2025-05-27
Payer: MEDICAID

## 2025-05-27 DIAGNOSIS — F17.200 NICOTINE DEPENDENCE, UNCOMPLICATED: Primary | ICD-10-CM

## 2025-05-27 PROCEDURE — 99407 BEHAV CHNG SMOKING > 10 MIN: CPT | Mod: S$PBB,,, | Performed by: FAMILY MEDICINE

## 2025-05-27 NOTE — PROGRESS NOTES
Spoke with patient today in regard to smoking cessation progress for 12 month follow up. He states he is not tobacco free but is trying to cut down on his own. Congratulated patient on his progress. Patient was not interested in scheduling an appointment. Informed patient of benefit period, future follow ups and contact information if any further help is needed. Will complete/ resolve smart form for 3/6/12 month follow up for Quit # 1.

## 2025-05-28 ENCOUNTER — TELEPHONE (OUTPATIENT)
Dept: ORTHOPEDICS | Facility: CLINIC | Age: 60
End: 2025-05-28
Payer: MEDICAID

## 2025-05-28 DIAGNOSIS — L02.512 ABSCESS OF LEFT HAND: Primary | ICD-10-CM

## 2025-05-28 DIAGNOSIS — M19.042 ARTHRITIS OF LEFT HAND: ICD-10-CM

## 2025-06-17 ENCOUNTER — HOSPITAL ENCOUNTER (INPATIENT)
Facility: HOSPITAL | Age: 60
LOS: 3 days | Discharge: HOME | DRG: 397 | End: 2025-06-20
Attending: SURGERY | Admitting: FAMILY MEDICINE
Payer: MEDICAID

## 2025-06-17 DIAGNOSIS — R10.9 ABDOMINAL PAIN: ICD-10-CM

## 2025-06-17 DIAGNOSIS — K35.30 ACUTE APPENDICITIS WITH LOCALIZED PERITONITIS, WITHOUT PERFORATION, ABSCESS, OR GANGRENE: Primary | ICD-10-CM

## 2025-06-17 DIAGNOSIS — R06.82 TACHYPNEA: ICD-10-CM

## 2025-06-17 DIAGNOSIS — D72.829 LEUKOCYTOSIS: ICD-10-CM

## 2025-06-17 DIAGNOSIS — D72.829 LEUKOCYTOSIS, UNSPECIFIED TYPE: ICD-10-CM

## 2025-06-17 LAB
ABSOLUTE EOSINOPHIL (OHS): 0.18 K/UL
ABSOLUTE MONOCYTE (OHS): 1.62 K/UL (ref 0.3–1)
ABSOLUTE NEUTROPHIL COUNT (OHS): 16.1 K/UL (ref 1.8–7.7)
ALBUMIN SERPL BCP-MCNC: 4 G/DL (ref 3.5–5.2)
ALP SERPL-CCNC: 103 UNIT/L (ref 40–150)
ALT SERPL W/O P-5'-P-CCNC: 26 UNIT/L (ref 10–44)
ANION GAP (OHS): 12 MMOL/L (ref 8–16)
AST SERPL-CCNC: 18 UNIT/L (ref 11–45)
BASOPHILS # BLD AUTO: 0.06 K/UL
BASOPHILS NFR BLD AUTO: 0.3 %
BILIRUB SERPL-MCNC: 0.7 MG/DL (ref 0.1–1)
BILIRUB UR QL STRIP.AUTO: NEGATIVE
BUN SERPL-MCNC: 23 MG/DL (ref 8–23)
CALCIUM SERPL-MCNC: 10.3 MG/DL (ref 8.7–10.5)
CHLORIDE SERPL-SCNC: 99 MMOL/L (ref 95–110)
CLARITY UR: CLEAR
CO2 SERPL-SCNC: 22 MMOL/L (ref 23–29)
COLOR UR AUTO: YELLOW
CREAT SERPL-MCNC: 1 MG/DL (ref 0.5–1.4)
EAG (OHS): 163 MG/DL (ref 68–131)
ERYTHROCYTE [DISTWIDTH] IN BLOOD BY AUTOMATED COUNT: 13.5 % (ref 11.5–14.5)
GFR SERPLBLD CREATININE-BSD FMLA CKD-EPI: >60 ML/MIN/1.73/M2
GLUCOSE SERPL-MCNC: 148 MG/DL (ref 70–110)
GLUCOSE UR QL STRIP: ABNORMAL
HBA1C MFR BLD: 7.3 % (ref 4–5.6)
HCT VFR BLD AUTO: 50.4 % (ref 40–54)
HGB BLD-MCNC: 17.2 GM/DL (ref 14–18)
HGB UR QL STRIP: NEGATIVE
HOLD SPECIMEN: NORMAL
IMM GRANULOCYTES # BLD AUTO: 0.14 K/UL (ref 0–0.04)
IMM GRANULOCYTES NFR BLD AUTO: 0.7 % (ref 0–0.5)
KETONES UR QL STRIP: ABNORMAL
LEUKOCYTE ESTERASE UR QL STRIP: NEGATIVE
LIPASE SERPL-CCNC: 13 U/L (ref 4–60)
LYMPHOCYTES # BLD AUTO: 3.21 K/UL (ref 1–4.8)
MCH RBC QN AUTO: 29.1 PG (ref 27–31)
MCHC RBC AUTO-ENTMCNC: 34.1 G/DL (ref 32–36)
MCV RBC AUTO: 85 FL (ref 82–98)
NITRITE UR QL STRIP: NEGATIVE
NUCLEATED RBC (/100WBC) (OHS): 0 /100 WBC
PH UR STRIP: 6 [PH]
PLATELET # BLD AUTO: 220 K/UL (ref 150–450)
PMV BLD AUTO: 8.7 FL (ref 9.2–12.9)
POCT GLUCOSE: 108 MG/DL (ref 70–110)
POTASSIUM SERPL-SCNC: 4.1 MMOL/L (ref 3.5–5.1)
PROT SERPL-MCNC: 8 GM/DL (ref 6–8.4)
PROT UR QL STRIP: NEGATIVE
RBC # BLD AUTO: 5.92 M/UL (ref 4.6–6.2)
RELATIVE EOSINOPHIL (OHS): 0.8 %
RELATIVE LYMPHOCYTE (OHS): 15.1 % (ref 18–48)
RELATIVE MONOCYTE (OHS): 7.6 % (ref 4–15)
RELATIVE NEUTROPHIL (OHS): 75.5 % (ref 38–73)
SODIUM SERPL-SCNC: 133 MMOL/L (ref 136–145)
SP GR UR STRIP: <=1.005
TROPONIN I SERPL DL<=0.01 NG/ML-MCNC: <0.006 NG/ML
UROBILINOGEN UR STRIP-ACNC: NEGATIVE EU/DL
WBC # BLD AUTO: 21.31 K/UL (ref 3.9–12.7)

## 2025-06-17 PROCEDURE — 83690 ASSAY OF LIPASE: CPT | Performed by: SURGERY

## 2025-06-17 PROCEDURE — G0378 HOSPITAL OBSERVATION PER HR: HCPCS

## 2025-06-17 PROCEDURE — 63600175 PHARM REV CODE 636 W HCPCS: Performed by: NURSE PRACTITIONER

## 2025-06-17 PROCEDURE — 93005 ELECTROCARDIOGRAM TRACING: CPT

## 2025-06-17 PROCEDURE — 94760 N-INVAS EAR/PLS OXIMETRY 1: CPT

## 2025-06-17 PROCEDURE — 25000003 PHARM REV CODE 250: Performed by: NURSE PRACTITIONER

## 2025-06-17 PROCEDURE — 96375 TX/PRO/DX INJ NEW DRUG ADDON: CPT

## 2025-06-17 PROCEDURE — 99222 1ST HOSP IP/OBS MODERATE 55: CPT | Mod: ,,, | Performed by: INTERNAL MEDICINE

## 2025-06-17 PROCEDURE — 99285 EMERGENCY DEPT VISIT HI MDM: CPT | Mod: 25

## 2025-06-17 PROCEDURE — 96365 THER/PROPH/DIAG IV INF INIT: CPT

## 2025-06-17 PROCEDURE — 36415 COLL VENOUS BLD VENIPUNCTURE: CPT | Performed by: INTERNAL MEDICINE

## 2025-06-17 PROCEDURE — 84484 ASSAY OF TROPONIN QUANT: CPT | Performed by: SURGERY

## 2025-06-17 PROCEDURE — 83036 HEMOGLOBIN GLYCOSYLATED A1C: CPT | Performed by: INTERNAL MEDICINE

## 2025-06-17 PROCEDURE — 25500020 PHARM REV CODE 255: Performed by: SURGERY

## 2025-06-17 PROCEDURE — 85025 COMPLETE CBC W/AUTO DIFF WBC: CPT | Performed by: SURGERY

## 2025-06-17 PROCEDURE — 10061 I&D ABSCESS COMP/MULTIPLE: CPT

## 2025-06-17 PROCEDURE — 11000001 HC ACUTE MED/SURG PRIVATE ROOM

## 2025-06-17 PROCEDURE — 93010 ELECTROCARDIOGRAM REPORT: CPT | Mod: ,,, | Performed by: INTERNAL MEDICINE

## 2025-06-17 PROCEDURE — 84520 ASSAY OF UREA NITROGEN: CPT | Performed by: SURGERY

## 2025-06-17 PROCEDURE — 81003 URINALYSIS AUTO W/O SCOPE: CPT | Performed by: SURGERY

## 2025-06-17 PROCEDURE — 25000003 PHARM REV CODE 250: Performed by: SURGERY

## 2025-06-17 RX ORDER — ONDANSETRON HYDROCHLORIDE 2 MG/ML
4 INJECTION, SOLUTION INTRAVENOUS EVERY 8 HOURS PRN
Status: DISCONTINUED | OUTPATIENT
Start: 2025-06-17 | End: 2025-06-20 | Stop reason: HOSPADM

## 2025-06-17 RX ORDER — SODIUM CHLORIDE 0.9 % (FLUSH) 0.9 %
10 SYRINGE (ML) INJECTION
Status: DISCONTINUED | OUTPATIENT
Start: 2025-06-17 | End: 2025-06-20 | Stop reason: HOSPADM

## 2025-06-17 RX ORDER — INSULIN ASPART 100 [IU]/ML
0-5 INJECTION, SOLUTION INTRAVENOUS; SUBCUTANEOUS EVERY 6 HOURS PRN
Status: DISCONTINUED | OUTPATIENT
Start: 2025-06-17 | End: 2025-06-18

## 2025-06-17 RX ORDER — GLUCAGON 1 MG
1 KIT INJECTION
Status: DISCONTINUED | OUTPATIENT
Start: 2025-06-17 | End: 2025-06-18

## 2025-06-17 RX ORDER — MORPHINE SULFATE 2 MG/ML
2 INJECTION, SOLUTION INTRAMUSCULAR; INTRAVENOUS EVERY 4 HOURS PRN
Status: DISCONTINUED | OUTPATIENT
Start: 2025-06-17 | End: 2025-06-20

## 2025-06-17 RX ORDER — MORPHINE SULFATE 4 MG/ML
4 INJECTION, SOLUTION INTRAMUSCULAR; INTRAVENOUS EVERY 4 HOURS PRN
Status: DISCONTINUED | OUTPATIENT
Start: 2025-06-17 | End: 2025-06-20

## 2025-06-17 RX ORDER — TALC
6 POWDER (GRAM) TOPICAL NIGHTLY PRN
Status: DISCONTINUED | OUTPATIENT
Start: 2025-06-17 | End: 2025-06-20 | Stop reason: HOSPADM

## 2025-06-17 RX ORDER — SODIUM CHLORIDE 9 MG/ML
INJECTION, SOLUTION INTRAVENOUS CONTINUOUS
Status: DISCONTINUED | OUTPATIENT
Start: 2025-06-17 | End: 2025-06-18

## 2025-06-17 RX ORDER — ONDANSETRON HYDROCHLORIDE 2 MG/ML
4 INJECTION, SOLUTION INTRAVENOUS
Status: COMPLETED | OUTPATIENT
Start: 2025-06-17 | End: 2025-06-17

## 2025-06-17 RX ORDER — SODIUM CHLORIDE 9 MG/ML
1000 INJECTION, SOLUTION INTRAVENOUS
Status: COMPLETED | OUTPATIENT
Start: 2025-06-17 | End: 2025-06-17

## 2025-06-17 RX ORDER — MORPHINE SULFATE 2 MG/ML
4 INJECTION, SOLUTION INTRAMUSCULAR; INTRAVENOUS
Refills: 0 | Status: COMPLETED | OUTPATIENT
Start: 2025-06-17 | End: 2025-06-17

## 2025-06-17 RX ADMIN — SODIUM CHLORIDE 1000 ML: 9 INJECTION, SOLUTION INTRAVENOUS at 03:06

## 2025-06-17 RX ADMIN — ONDANSETRON 4 MG: 2 INJECTION INTRAMUSCULAR; INTRAVENOUS at 02:06

## 2025-06-17 RX ADMIN — IOHEXOL 75 ML: 350 INJECTION, SOLUTION INTRAVENOUS at 02:06

## 2025-06-17 RX ADMIN — MORPHINE SULFATE 4 MG: 2 INJECTION, SOLUTION INTRAMUSCULAR; INTRAVENOUS at 02:06

## 2025-06-17 RX ADMIN — MORPHINE SULFATE 2 MG: 2 INJECTION, SOLUTION INTRAMUSCULAR; INTRAVENOUS at 07:06

## 2025-06-17 RX ADMIN — PIPERACILLIN SODIUM AND TAZOBACTAM SODIUM 4.5 G: 4; .5 INJECTION, POWDER, LYOPHILIZED, FOR SOLUTION INTRAVENOUS at 10:06

## 2025-06-17 RX ADMIN — SODIUM CHLORIDE 500 ML: 9 INJECTION, SOLUTION INTRAVENOUS at 01:06

## 2025-06-17 RX ADMIN — PIPERACILLIN SODIUM AND TAZOBACTAM SODIUM 4.5 G: 4; .5 INJECTION, POWDER, LYOPHILIZED, FOR SOLUTION INTRAVENOUS at 03:06

## 2025-06-17 NOTE — ED TRIAGE NOTES
Pt arrived to ED c/o constant right lower abdominal pain that started last night. Pt guarding right lower abdomen. Pt rates pain 10/10. LBM: 06/17/25. Pt denies nausea, vomiting.    Initiate Treatment: Efudex 5% cream- Apply to affected area on nose twice daily for 2 weeks. May crust and scab.  Then stop and let heal. Detail Level: Zone

## 2025-06-17 NOTE — ED NOTES
URINE COLLECTION PENDING: Pt informed urine specimen is required to send to lab. Pt reports unable to provide specimen at this time. Sterile specimen container and clean catch instructions given to patient. Pt instructed to notify ED staff immediately once urine specimen has been obtained. Pt verbalized understanding.

## 2025-06-17 NOTE — PLAN OF CARE
Problem: Adult Inpatient Plan of Care  Goal: Plan of Care Review  Outcome: Progressing     Problem: Diabetes Comorbidity  Goal: Blood Glucose Level Within Targeted Range  Outcome: Progressing     Problem: Fall Injury Risk  Goal: Absence of Fall and Fall-Related Injury  Outcome: Progressing     Problem: Pain Acute  Goal: Optimal Pain Control and Function  Outcome: Progressing     Problem: Infection  Goal: Absence of Infection Signs and Symptoms  Outcome: Progressing

## 2025-06-17 NOTE — SUBJECTIVE & OBJECTIVE
Past Medical History:   Diagnosis Date    Benign paroxysmal vertigo, bilateral     Diabetes mellitus     Diabetes mellitus type I     Hyperlipemia     Renal disorder     Urinary tract infection        Past Surgical History:   Procedure Laterality Date    CARPAL TUNNEL RELEASE  25 YEARS AGO    COLONOSCOPY      COLONOSCOPY N/A 1/25/2016    Procedure: COLONOSCOPY;  Surgeon: Luis Bogran-Reyes, MD;  Location: [facility];  Service: Endoscopy;  Laterality: N/A;    DEBRIDEMENT, PHALANX, HAND Left 12/29/2022    Procedure: DEBRIDEMENT, PHALANX, HAND;  Surgeon: Abdon Reid Jr., MD;  Location: [facility];  Service: Orthopedics;  Laterality: Left;    DEBRIDEMENT, PHALANX, HAND Left 2/20/2024    Procedure: DEBRIDEMENT, PHALANX, HAND;  Surgeon: Abdon Reid Jr., MD;  Location: [facility];  Service: Orthopedics;  Laterality: Left;    ESOPHAGOGASTRODUODENOSCOPY      HERNIA REPAIR      HERNIA REPAIR         Review of patient's allergies indicates:   Allergen Reactions    Sulfamethoxazole      Other Reaction(s): hives rash    Trimethoprim      Other Reaction(s): hives rash    Bactrim [sulfamethoxazole-trimethoprim] Rash       No current facility-administered medications on file prior to encounter.     Current Outpatient Medications on File Prior to Encounter   Medication Sig    aspirin (ECOTRIN) 81 MG EC tablet Take 81 mg by mouth once daily.    atorvastatin (LIPITOR) 80 MG tablet Take 80 mg by mouth.    cyclobenzaprine (FLEXERIL) 10 MG tablet Take 10 mg by mouth.    empagliflozin (JARDIANCE) 25 mg tablet Take 1 tablet (25 mg total) by mouth once daily.    finasteride (PROSCAR) 5 mg tablet Take 1 tablet (5 mg total) by mouth once daily.    fish oil-omega-3 fatty acids 300-1,000 mg capsule Take 1 capsule by mouth 2 (two) times a day.    HYDROcodone-acetaminophen (NORCO) 5-325 mg per tablet Take 1 tablet by mouth every 4 (four) hours as needed for Pain.    insulin aspart U-100 (NOVOLOG FLEXPEN U-100 INSULIN) 100 unit/mL (3 mL) InPn  "pen Inject 7 Units into the skin 3 (three) times daily with meals.    insulin glargine U-100, Lantus, 100 unit/mL (3 mL) SubQ InPn pen Inject 14 Units into the skin once daily.    tamsulosin (FLOMAX) 0.4 mg Cap Take 1 capsule (0.4 mg total) by mouth 2 (two) times a day.    BAQSIMI 3 mg/actuation [place] 3 mg by Nasal route.    BD ULTRA-FINE JENNY PEN NEEDLE 32 gauge x 5/32" Ndle 4 (four) times daily.    BD ULTRA-FINE JENNY PEN NEEDLE 32 gauge x 5/32" Ndle To inject insulin 4 times daily    blood sugar diagnostic (TRUE METRIX GLUCOSE TEST STRIP) Strp Check BG three times daily    blood-glucose sensor (FREESTYLE JULIO 3 PLUS SENSOR) Sadaf To use for glucose monitoring every 15 days.    flash glucose scanning reader (FREESTYLE JULIO 2 READER) Misc Use as instructed for blood glucose monitoring.    flash glucose sensor (FREESTYLE JULIO 14 DAY SENSOR) Kit Inject 1 each into the skin.    FREESTYLE JULIO 2 SENSOR Kit CHANGE EVERY 14 DAYS FOR BLOOD GLUCOSE MONITORING    FREESTYLE JULIO 3 READER Misc 1 Device by Misc.(Non-Drug; Combo Route) route once. for 1 dose    insulin syringe-needle U-100 1 mL 31 gauge x 5/16 Syrg     meclizine (ANTIVERT) 50 MG tablet Take 50 mg by mouth 2 (two) times daily.    meloxicam (MOBIC) 15 MG tablet Take 15 mg by mouth.    pravastatin (PRAVACHOL) 40 MG tablet Take 40 mg by mouth every evening.    TRUE METRIX GLUCOSE TEST STRIP Strp 1 strip 4 (four) times daily.    TRUEPLUS LANCETS 33 gauge Misc USE TO TEST BLOOD SUGAR TWO TIMES DAILY     Family History       Problem Relation (Age of Onset)    Alzheimer's disease Mother    Cancer Father          Tobacco Use    Smoking status: Every Day     Current packs/day: 2.50     Average packs/day: 2.5 packs/day for 36.5 years (91.1 ttl pk-yrs)     Types: Cigarettes     Start date: 1989    Smokeless tobacco: Never    Tobacco comments:     Tobacco cessation   Substance and Sexual Activity    Alcohol use: No     Alcohol/week: 0.0 standard drinks of alcohol    Drug " use: No     Comment: 30YEARS AGO, PATIENT ON PRESCRIBED PAIN MEDICATION.     Sexual activity: Not Currently     Review of Systems   Constitutional:  Negative for chills and fever.   HENT:  Negative for congestion, postnasal drip and sore throat.    Eyes:  Negative for photophobia.   Respiratory:  Negative for chest tightness and shortness of breath.    Cardiovascular:  Negative for chest pain.   Gastrointestinal:  Positive for abdominal pain and nausea. Negative for abdominal distention, blood in stool and vomiting.   Genitourinary:  Negative for dysuria, flank pain and hematuria.   Musculoskeletal:  Negative for back pain.   Skin:  Negative for pallor.   Neurological:  Negative for dizziness, seizures, facial asymmetry, speech difficulty and numbness.   Hematological:  Does not bruise/bleed easily.   Psychiatric/Behavioral:  Negative for agitation and suicidal ideas. The patient is not nervous/anxious.      Objective:     Vital Signs (Most Recent):  Temp: 98.2 °F (36.8 °C) (06/17/25 1636)  Pulse: 92 (06/17/25 1638)  Resp: 18 (06/17/25 1636)  BP: 134/80 (06/17/25 1636)  SpO2: 97 % (06/17/25 1636) Vital Signs (24h Range):  Temp:  [98.2 °F (36.8 °C)-99.9 °F (37.7 °C)] 98.2 °F (36.8 °C)  Pulse:  [] 92  Resp:  [16-20] 18  SpO2:  [95 %-97 %] 97 %  BP: (124-138)/(68-80) 134/80     Weight: 71.3 kg (157 lb 3 oz)  Body mass index is 27.84 kg/m².     Physical Exam  Vitals and nursing note reviewed.   Constitutional:       Appearance: He is well-developed.   HENT:      Head: Normocephalic and atraumatic.      Nose: Nose normal.   Eyes:      Conjunctiva/sclera: Conjunctivae normal.      Pupils: Pupils are equal, round, and reactive to light.   Neck:      Thyroid: No thyromegaly.      Vascular: No JVD.   Cardiovascular:      Rate and Rhythm: Normal rate and regular rhythm.      Heart sounds: Normal heart sounds.   Pulmonary:      Effort: Pulmonary effort is normal.      Breath sounds: Normal breath sounds.   Abdominal:       General: Bowel sounds are normal. There is no distension.      Palpations: Abdomen is soft. There is no mass.      Tenderness: There is abdominal tenderness in the right lower quadrant. There is guarding and rebound.       Musculoskeletal:         General: Normal range of motion.      Cervical back: Normal range of motion and neck supple.   Lymphadenopathy:      Cervical: No cervical adenopathy.   Skin:     General: Skin is warm and dry.      Coloration: Skin is not pale.      Findings: No rash.   Neurological:      Mental Status: He is alert and oriented to person, place, and time.      Cranial Nerves: No cranial nerve deficit.      Deep Tendon Reflexes: Reflexes are normal and symmetric.              CRANIAL NERVES     CN III, IV, VI   Pupils are equal, round, and reactive to light.       Significant Labs: All pertinent labs within the past 24 hours have been reviewed.  A1C:   Recent Labs   Lab 02/11/25  1830   HGBA1C 7.5*     CBC:   Recent Labs   Lab 06/17/25  1337   WBC 21.31*   HGB 17.2   HCT 50.4        CMP:   Recent Labs   Lab 06/17/25  1337   *   K 4.1   CL 99   CO2 22*   *   BUN 23   CREATININE 1.0   CALCIUM 10.3   PROT 8.0   ALBUMIN 4.0   BILITOT 0.7   ALKPHOS 103   AST 18   ALT 26   ANIONGAP 12     Troponin:   Recent Labs   Lab 06/17/25  1337   TROPONINI <0.006     Urine Studies:   Recent Labs   Lab 06/17/25  1351   COLORU Yellow   APPEARANCEUA Clear   PHUR 6.0   SPECGRAV <=1.005*   PROTEINUA Negative   GLUCUA 2+*   BILIRUBINUA Negative   OCCULTUA Negative   NITRITE Negative   UROBILINOGEN Negative   LEUKOCYTESUR Negative       Significant Imaging: I have reviewed all pertinent imaging results/findings within the past 24 hours.  CT: I have reviewed all pertinent results/findings within the past 24 hours and my personal findings are:  The appendix is dilated and fluid-filled, and there is surrounding inflammatory fat stranding.  Findings are consistent with acute appendicitis.  There  is no abscess or perforation.  The appendix is noted to be retrocecal in location, located in the right mid abdomen.  No inflammation is seen elsewhere in the bowel.  There are few distal colonic diverticula.  There is no free intraperitoneal fluid or air.

## 2025-06-17 NOTE — HPI
Ross Crystal is a 60 y.o. male  with a past medical history of T2DM, HTN, HLD, Neuropathy here for for acute appendicitis.  He reports right lower quadrant pain for 2 days.  The pain has been getting worse, 8/10 in intensity, worse with movement and coughing.  He denies vomiting.  Some nausea is reported.  Workup in the emergency room showed leukocytosis with white count more than 21,000.  CT scan showed The appendix is dilated and fluid-filled, and there is surrounding inflammatory fat stranding.  Findings are consistent with acute appendicitis.  There is no abscess or perforation.  The appendix is noted to be retrocecal in location, located in the right mid abdomen.  No inflammation is seen elsewhere in the bowel.  There are few distal colonic diverticula.  There is no free intraperitoneal fluid or air.   Acute appendicitis without abscess or perforation.    I saw him on 3rd floor where he has been admitted to hospital service after surgical consultation.  He has been placed on OR list for tomorrow morning.  He will be NPO.  Pain control.  IV Zosyn.

## 2025-06-17 NOTE — H&P
[facility] ([address] [place] [address] Medicine  History & Physical    Patient Name: Ross Crystal  MRN: [id number]  Patient Class: OP- Observation  Admission Date: 6/17/2025  Attending Physician: Kristopher Andersen MD   Primary Care Provider: Ross Valverde MD         Patient information was obtained from patient, past medical records, and ER records.     Subjective:     Principal Problem:  Acute appendicitis without perforation    Chief Complaint:   Chief Complaint   Patient presents with    Abdominal Pain     Pt arrived to ED c/o constant right lower abdominal pain that started last night. Pt guarding right lower abdomen. Pt rates pain 10/10. LBM: 06/17/25. Pt denies nausea, vomiting.         HPI: Ross Crystal is a 60 y.o. male  with a past medical history of T2DM, HTN, HLD, Neuropathy here for for acute appendicitis.  He reports right lower quadrant pain for 2 days.  The pain has been getting worse, 8/10 in intensity, worse with movement and coughing.  He denies vomiting.  Some nausea is reported.  Workup in the emergency room showed leukocytosis with white count more than 21,000.  CT scan showed The appendix is dilated and fluid-filled, and there is surrounding inflammatory fat stranding.  Findings are consistent with acute appendicitis.  There is no abscess or perforation.  The appendix is noted to be retrocecal in location, located in the right mid abdomen.  No inflammation is seen elsewhere in the bowel.  There are few distal colonic diverticula.  There is no free intraperitoneal fluid or air.   Acute appendicitis without abscess or perforation.    I saw him on 3rd floor where he has been admitted to hospital service after surgical consultation.  He has been placed on OR list for tomorrow morning.  He will be NPO.  Pain control.  IV Zosyn.    Past Medical History:   Diagnosis Date    Benign paroxysmal vertigo, bilateral     Diabetes mellitus     Diabetes mellitus type I     Hyperlipemia     Renal disorder      Urinary tract infection        Past Surgical History:   Procedure Laterality Date    CARPAL TUNNEL RELEASE  25 YEARS AGO    COLONOSCOPY      COLONOSCOPY N/A 1/25/2016    Procedure: COLONOSCOPY;  Surgeon: Luis Bogran-Reyes, MD;  Location: [facility];  Service: Endoscopy;  Laterality: N/A;    DEBRIDEMENT, PHALANX, HAND Left 12/29/2022    Procedure: DEBRIDEMENT, PHALANX, HAND;  Surgeon: Abdon Reid Jr., MD;  Location: [facility];  Service: Orthopedics;  Laterality: Left;    DEBRIDEMENT, PHALANX, HAND Left 2/20/2024    Procedure: DEBRIDEMENT, PHALANX, HAND;  Surgeon: Abdon Reid Jr., MD;  Location: [facility];  Service: Orthopedics;  Laterality: Left;    ESOPHAGOGASTRODUODENOSCOPY      HERNIA REPAIR      HERNIA REPAIR         Review of patient's allergies indicates:   Allergen Reactions    Sulfamethoxazole      Other Reaction(s): hives rash    Trimethoprim      Other Reaction(s): hives rash    Bactrim [sulfamethoxazole-trimethoprim] Rash       No current facility-administered medications on file prior to encounter.     Current Outpatient Medications on File Prior to Encounter   Medication Sig    aspirin (ECOTRIN) 81 MG EC tablet Take 81 mg by mouth once daily.    atorvastatin (LIPITOR) 80 MG tablet Take 80 mg by mouth.    cyclobenzaprine (FLEXERIL) 10 MG tablet Take 10 mg by mouth.    empagliflozin (JARDIANCE) 25 mg tablet Take 1 tablet (25 mg total) by mouth once daily.    finasteride (PROSCAR) 5 mg tablet Take 1 tablet (5 mg total) by mouth once daily.    fish oil-omega-3 fatty acids 300-1,000 mg capsule Take 1 capsule by mouth 2 (two) times a day.    HYDROcodone-acetaminophen (NORCO) 5-325 mg per tablet Take 1 tablet by mouth every 4 (four) hours as needed for Pain.    insulin aspart U-100 (NOVOLOG FLEXPEN U-100 INSULIN) 100 unit/mL (3 mL) InPn pen Inject 7 Units into the skin 3 (three) times daily with meals.    insulin glargine U-100, Lantus, 100 unit/mL (3 mL) SubQ InPn pen Inject 14 Units into the skin  "once daily.    tamsulosin (FLOMAX) 0.4 mg Cap Take 1 capsule (0.4 mg total) by mouth 2 (two) times a day.    BAQSIMI 3 mg/actuation [place] 3 mg by Nasal route.    BD ULTRA-FINE JENNY PEN NEEDLE 32 gauge x 5/32" Ndle 4 (four) times daily.    BD ULTRA-FINE JENNY PEN NEEDLE 32 gauge x 5/32" Ndle To inject insulin 4 times daily    blood sugar diagnostic (TRUE METRIX GLUCOSE TEST STRIP) Strp Check BG three times daily    blood-glucose sensor (FREESTYLE JULIO 3 PLUS SENSOR) Sadaf To use for glucose monitoring every 15 days.    flash glucose scanning reader (FREESTYLE JULIO 2 READER) Misc Use as instructed for blood glucose monitoring.    flash glucose sensor (FREESTYLE JULIO 14 DAY SENSOR) Kit Inject 1 each into the skin.    FREESTYLE JULIO 2 SENSOR Kit CHANGE EVERY 14 DAYS FOR BLOOD GLUCOSE MONITORING    FREESTYLE JULIO 3 READER Misc 1 Device by Misc.(Non-Drug; Combo Route) route once. for 1 dose    insulin syringe-needle U-100 1 mL 31 gauge x 5/16 Syrg     meclizine (ANTIVERT) 50 MG tablet Take 50 mg by mouth 2 (two) times daily.    meloxicam (MOBIC) 15 MG tablet Take 15 mg by mouth.    pravastatin (PRAVACHOL) 40 MG tablet Take 40 mg by mouth every evening.    TRUE METRIX GLUCOSE TEST STRIP Strp 1 strip 4 (four) times daily.    TRUEPLUS LANCETS 33 gauge Misc USE TO TEST BLOOD SUGAR TWO TIMES DAILY     Family History       Problem Relation (Age of Onset)    Alzheimer's disease Mother    Cancer Father          Tobacco Use    Smoking status: Every Day     Current packs/day: 2.50     Average packs/day: 2.5 packs/day for 36.5 years (91.1 ttl pk-yrs)     Types: Cigarettes     Start date: 1989    Smokeless tobacco: Never    Tobacco comments:     Tobacco cessation   Substance and Sexual Activity    Alcohol use: No     Alcohol/week: 0.0 standard drinks of alcohol    Drug use: No     Comment: 30YEARS AGO, PATIENT ON PRESCRIBED PAIN MEDICATION.     Sexual activity: Not Currently     Review of Systems   Constitutional:  Negative for " chills and fever.   HENT:  Negative for congestion, postnasal drip and sore throat.    Eyes:  Negative for photophobia.   Respiratory:  Negative for chest tightness and shortness of breath.    Cardiovascular:  Negative for chest pain.   Gastrointestinal:  Positive for abdominal pain and nausea. Negative for abdominal distention, blood in stool and vomiting.   Genitourinary:  Negative for dysuria, flank pain and hematuria.   Musculoskeletal:  Negative for back pain.   Skin:  Negative for pallor.   Neurological:  Negative for dizziness, seizures, facial asymmetry, speech difficulty and numbness.   Hematological:  Does not bruise/bleed easily.   Psychiatric/Behavioral:  Negative for agitation and suicidal ideas. The patient is not nervous/anxious.      Objective:     Vital Signs (Most Recent):  Temp: 98.2 °F (36.8 °C) (06/17/25 1636)  Pulse: 92 (06/17/25 1638)  Resp: 18 (06/17/25 1636)  BP: 134/80 (06/17/25 1636)  SpO2: 97 % (06/17/25 1636) Vital Signs (24h Range):  Temp:  [98.2 °F (36.8 °C)-99.9 °F (37.7 °C)] 98.2 °F (36.8 °C)  Pulse:  [] 92  Resp:  [16-20] 18  SpO2:  [95 %-97 %] 97 %  BP: (124-138)/(68-80) 134/80     Weight: 71.3 kg (157 lb 3 oz)  Body mass index is 27.84 kg/m².     Physical Exam  Vitals and nursing note reviewed.   Constitutional:       Appearance: He is well-developed.   HENT:      Head: Normocephalic and atraumatic.      Nose: Nose normal.   Eyes:      Conjunctiva/sclera: Conjunctivae normal.      Pupils: Pupils are equal, round, and reactive to light.   Neck:      Thyroid: No thyromegaly.      Vascular: No JVD.   Cardiovascular:      Rate and Rhythm: Normal rate and regular rhythm.      Heart sounds: Normal heart sounds.   Pulmonary:      Effort: Pulmonary effort is normal.      Breath sounds: Normal breath sounds.   Abdominal:      General: Bowel sounds are normal. There is no distension.      Palpations: Abdomen is soft. There is no mass.      Tenderness: There is abdominal tenderness in  the right lower quadrant. There is guarding and rebound.       Musculoskeletal:         General: Normal range of motion.      Cervical back: Normal range of motion and neck supple.   Lymphadenopathy:      Cervical: No cervical adenopathy.   Skin:     General: Skin is warm and dry.      Coloration: Skin is not pale.      Findings: No rash.   Neurological:      Mental Status: He is alert and oriented to person, place, and time.      Cranial Nerves: No cranial nerve deficit.      Deep Tendon Reflexes: Reflexes are normal and symmetric.              CRANIAL NERVES     CN III, IV, VI   Pupils are equal, round, and reactive to light.       Significant Labs: All pertinent labs within the past 24 hours have been reviewed.  A1C:   Recent Labs   Lab 02/11/25  1830   HGBA1C 7.5*     CBC:   Recent Labs   Lab 06/17/25  1337   WBC 21.31*   HGB 17.2   HCT 50.4        CMP:   Recent Labs   Lab 06/17/25  1337   *   K 4.1   CL 99   CO2 22*   *   BUN 23   CREATININE 1.0   CALCIUM 10.3   PROT 8.0   ALBUMIN 4.0   BILITOT 0.7   ALKPHOS 103   AST 18   ALT 26   ANIONGAP 12     Troponin:   Recent Labs   Lab 06/17/25  1337   TROPONINI <0.006     Urine Studies:   Recent Labs   Lab 06/17/25  1351   COLORU Yellow   APPEARANCEUA Clear   PHUR 6.0   SPECGRAV <=1.005*   PROTEINUA Negative   GLUCUA 2+*   BILIRUBINUA Negative   OCCULTUA Negative   NITRITE Negative   UROBILINOGEN Negative   LEUKOCYTESUR Negative       Significant Imaging: I have reviewed all pertinent imaging results/findings within the past 24 hours.  CT: I have reviewed all pertinent results/findings within the past 24 hours and my personal findings are:  The appendix is dilated and fluid-filled, and there is surrounding inflammatory fat stranding.  Findings are consistent with acute appendicitis.  There is no abscess or perforation.  The appendix is noted to be retrocecal in location, located in the right mid abdomen.  No inflammation is seen elsewhere in the  "bowel.  There are few distal colonic diverticula.  There is no free intraperitoneal fluid or air.  Assessment/Plan:     Assessment & Plan  Essential hypertension  Patient's blood pressure range in the last 24 hours was: BP  Min: 124/69  Max: 138/80.The patient's inpatient anti-hypertensive regimen is listed below:  Current Antihypertensives     Blood pressure is well controlled he is NPO we will hold meds for now  Pure hypercholesterolemia  He is on statin.  Hold for now    Type 2 diabetes mellitus without complication  Patient's FSGs are uncontrolled due to hyperglycemia on current medication regimen.  Last A1c reviewed-   Lab Results   Component Value Date    HGBA1C 7.5 (H) 02/11/2025     Most recent fingerstick glucose reviewed- No results for input(s): "POCTGLUCOSE" in the last 24 hours.  Current correctional scale  Low  Maintain anti-hyperglycemic dose as follows-   Antihyperglycemics (From admission, onward)      Start     Stop Route Frequency Ordered    06/17/25 1802  insulin aspart U-100 pen 0-5 Units         -- SubQ Every 6 hours PRN 06/17/25 1702          Hold Oral hypoglycemics while patient is in the hospital.  BPH (benign prostatic hyperplasia)  He is on tamsulosin.  Hold for now as he is NPO    Acute appendicitis with localized peritonitis, without perforation, abscess, or gangrene  NPO   IV fluids   Pain control  IV Zosyn    VTE Risk Mitigation (From admission, onward)           Ordered     IP VTE HIGH RISK PATIENT  Once         06/17/25 1635     Place sequential compression device  Until discontinued         06/17/25 1635                       On 06/17/2025, patient should be placed in hospital observation services under my care.             Kristopher Andersen MD  Department of Hospital Medicine  [place] [facility] (3rd Fl)          "

## 2025-06-17 NOTE — ASSESSMENT & PLAN NOTE
"Patient's FSGs are uncontrolled due to hyperglycemia on current medication regimen.  Last A1c reviewed-   Lab Results   Component Value Date    HGBA1C 7.5 (H) 02/11/2025     Most recent fingerstick glucose reviewed- No results for input(s): "POCTGLUCOSE" in the last 24 hours.  Current correctional scale  Low  Maintain anti-hyperglycemic dose as follows-   Antihyperglycemics (From admission, onward)      Start     Stop Route Frequency Ordered    06/17/25 1802  insulin aspart U-100 pen 0-5 Units         -- SubQ Every 6 hours PRN 06/17/25 1702          Hold Oral hypoglycemics while patient is in the hospital.  "

## 2025-06-17 NOTE — ED PROVIDER NOTES
Encounter Date: 6/17/2025       History     Chief Complaint   Patient presents with    Abdominal Pain     Pt arrived to ED c/o constant right lower abdominal pain that started last night. Pt guarding right lower abdomen. Pt rates pain 10/10. LBM: 06/17/25. Pt denies nausea, vomiting.      Ross Crystal is a 60 y.o. male with PMH of DM type 1, hyperlipidemia, vertigo presenting to the ED for evaluation of abdominal pain.  Patient presents with a two-day history of worsening right lower abdominal pain.  Pain is described as sharp, stabbing, currently rated 20/10 in severity.  He reports associated nausea secondary to pain.  Denies vomiting or loose stool.  He reports that his last bowel movement was yesterday but was very small.  Denies fever or UTI symptoms.    The history is provided by the patient.     Review of patient's allergies indicates:   Allergen Reactions    Sulfamethoxazole      Other Reaction(s): hives rash    Trimethoprim      Other Reaction(s): hives rash    Bactrim [sulfamethoxazole-trimethoprim] Rash     Past Medical History:   Diagnosis Date    Benign paroxysmal vertigo, bilateral     Diabetes mellitus     Diabetes mellitus type I     Hyperlipemia     Renal disorder     Urinary tract infection      Past Surgical History:   Procedure Laterality Date    CARPAL TUNNEL RELEASE  25 YEARS AGO    COLONOSCOPY      COLONOSCOPY N/A 1/25/2016    Procedure: COLONOSCOPY;  Surgeon: Luis Bogran-Reyes, MD;  Location: [facility];  Service: Endoscopy;  Laterality: N/A;    DEBRIDEMENT, PHALANX, HAND Left 12/29/2022    Procedure: DEBRIDEMENT, PHALANX, HAND;  Surgeon: Abdon Reid Jr., MD;  Location: [facility] OR;  Service: Orthopedics;  Laterality: Left;    DEBRIDEMENT, PHALANX, HAND Left 2/20/2024    Procedure: DEBRIDEMENT, PHALANX, HAND;  Surgeon: Abdon Reid Jr., MD;  Location: [facility] OR;  Service: Orthopedics;  Laterality: Left;    ESOPHAGOGASTRODUODENOSCOPY      HERNIA REPAIR      HERNIA REPAIR       Family  History   Problem Relation Name Age of Onset    Alzheimer's disease Mother      Cancer Father          throat cancer    Prostate cancer Neg Hx      Kidney disease Neg Hx       Social History[1]  Review of Systems   Constitutional:  Negative for appetite change, chills and fever.   HENT:  Negative for congestion, ear discharge, ear pain, postnasal drip, rhinorrhea and sore throat.    Respiratory:  Negative for cough, chest tightness and shortness of breath.    Cardiovascular:  Negative for chest pain.   Gastrointestinal:  Positive for abdominal pain (R lower quadrant). Negative for abdominal distention and nausea.   Genitourinary:  Negative for dysuria, flank pain, hematuria and urgency.   Musculoskeletal:  Negative for arthralgias and back pain.   Skin:  Negative for rash.   Neurological:  Negative for dizziness, weakness, numbness and headaches.   Hematological:  Does not bruise/bleed easily.       Physical Exam     Initial Vitals [06/17/25 1310]   BP Pulse Resp Temp SpO2   131/68 110 20 98.2 °F (36.8 °C) 96 %      MAP       --         Physical Exam    Nursing note and vitals reviewed.  Constitutional: He appears well-developed and well-nourished.   HENT:   Head: Normocephalic and atraumatic.   Eyes: Conjunctivae and EOM are normal. Pupils are equal, round, and reactive to light.   Neck: Neck supple.   Cardiovascular:  Normal rate, regular rhythm, normal heart sounds and intact distal pulses.           Pulmonary/Chest: Breath sounds normal.   Abdominal: Abdomen is soft. Bowel sounds are normal. There is abdominal tenderness in the right lower quadrant. There is rebound and guarding. positive Rovsing's sign  Musculoskeletal:         General: Normal range of motion.      Cervical back: Neck supple.     Neurological: He is alert and oriented to person, place, and time. He has normal strength.   Skin: Skin is warm and dry.   Psychiatric: He has a normal mood and affect. His behavior is normal. Judgment and thought  content normal.         ED Course   Procedures  Labs Reviewed   COMPREHENSIVE METABOLIC PANEL - Abnormal       Result Value    Sodium 133 (*)     Potassium 4.1      Chloride 99      CO2 22 (*)     Glucose 148 (*)     BUN 23      Creatinine 1.0      Calcium 10.3      Protein Total 8.0      Albumin 4.0      Bilirubin Total 0.7            AST 18      ALT 26      Anion Gap 12      eGFR >60     URINALYSIS, REFLEX TO URINE CULTURE - Abnormal    Color, UA Yellow      Appearance, UA Clear      pH, UA 6.0      Spec Grav UA <=1.005 (*)     Protein, UA Negative      Glucose, UA 2+ (*)     Ketones, UA 2+ (*)     Bilirubin, UA Negative      Blood, UA Negative      Nitrites, UA Negative      Urobilinogen, UA Negative      Leukocyte Esterase, UA Negative     CBC WITH DIFFERENTIAL - Abnormal    WBC 21.31 (*)     RBC 5.92      HGB 17.2      HCT 50.4      MCV 85      MCH 29.1      MCHC 34.1      RDW 13.5      Platelet Count 220      MPV 8.7 (*)     Nucleated RBC 0      Neut % 75.5 (*)     Lymph % 15.1 (*)     Mono % 7.6      Eos % 0.8      Basophil % 0.3      Imm Grans % 0.7 (*)     Neut # 16.10 (*)     Lymph # 3.21      Mono # 1.62 (*)     Eos # 0.18      Baso # 0.06      Imm Grans # 0.14 (*)    LIPASE - Normal    Lipase Level 13     TROPONIN I - Normal    Troponin-I <0.006     CBC W/ AUTO DIFFERENTIAL    Narrative:     The following orders were created for panel order CBC Auto Differential.  Procedure                               Abnormality         Status                     ---------                               -----------         ------                     CBC with Differential[[id number]]       Abnormal            Final result                 Please view results for these tests on the individual orders.   GREY TOP URINE HOLD    Extra Tube Hold for add-ons.            Imaging Results               CT Abdomen Pelvis With IV Contrast NO Oral Contrast (Final result)  Result time 06/17/25 14:57:07      Final result by  Dorita Foster MD (06/17/25 14:57:07)                   Impression:      Acute appendicitis without abscess or perforation.  Dr. Gonzalez was notified via secure chat at the time of this dictation.    This report was flagged in Epic as abnormal.      Electronically signed by: Dorita Foster  Date:    06/17/2025  Time:    14:57               Narrative:    EXAMINATION:  CT ABDOMEN PELVIS WITH IV CONTRAST    CLINICAL HISTORY:  Nausea/vomiting;RLQ abdominal pain (Age >= 14y);    TECHNIQUE:  Low dose axial images, sagittal and coronal reformations were obtained from the lung bases to the pubic symphysis following the IV administration of 75 mL of Omnipaque 350 .  No oral contrast was administered    COMPARISON:  04/16/2018    FINDINGS:  There is mild dependent atelectasis in the lung bases.  Emphysema is noted.    There is calcific plaque in the coronary arteries.    The liver, gallbladder, spleen, adrenal glands, pancreas and right kidney are normal.  There is a tiny midpole left renal cyst.  There is extensive calcific plaque in the abdominal aorta with no aneurysm.  There is either an ulcerated plaque or a calcified flap in the lumen of the infrarenal abdominal aorta.    The urinary bladder is distended.    The appendix is dilated and fluid-filled, and there is surrounding inflammatory fat stranding.  Findings are consistent with acute appendicitis.  There is no abscess or perforation.  The appendix is noted to be retrocecal in location, located in the right mid abdomen.  No inflammation is seen elsewhere in the bowel.  There are few distal colonic diverticula.  There is no free intraperitoneal fluid or air.                                       Medications   sodium chloride 0.9% flush 10 mL (has no administration in time range)   melatonin tablet 6 mg (has no administration in time range)   piperacillin-tazobactam (ZOSYN) 4.5 g in D5W 100 mL IVPB (MB+) (has no administration in time range)   morphine  injection 2 mg (has no administration in time range)   morphine injection 4 mg (has no administration in time range)   ondansetron injection 4 mg (has no administration in time range)   dextrose 50% injection 12.5 g (has no administration in time range)   glucagon (human recombinant) injection 1 mg (has no administration in time range)   insulin aspart U-100 pen 0-5 Units (has no administration in time range)   0.9% NaCl infusion ( Intravenous Restarted 6/17/25 1805)   sodium chloride 0.9% bolus 500 mL 500 mL (0 mLs Intravenous Stopped 6/17/25 1417)   morphine injection 4 mg (4 mg Intravenous Given 6/17/25 1404)   ondansetron injection 4 mg (4 mg Intravenous Given 6/17/25 1405)   iohexoL (OMNIPAQUE 350) injection 75 mL (75 mLs Intravenous Given 6/17/25 1438)   piperacillin-tazobactam (ZOSYN) 4.5 g in D5W 100 mL IVPB (MB+) (0 g Intravenous Stopped 6/17/25 1625)   0.9% NaCl infusion (0 mLs Intravenous Stopped 6/17/25 1806)     Medical Decision Making  Evaluation of a 60-year-old male presenting with right lower quadrant abdominal pain for the past 2 days  Presents nontoxic appearing with stable vital signs  Physical exam with + right lower quadrant abdominal tenderness with guarding. + Rovsing's.    Differential diagnosis includes acute appendicitis, diverticulitis, UTI, kidney stone, constipation    Amount and/or Complexity of Data Reviewed  Labs: ordered. Decision-making details documented in ED Course.  Radiology: ordered. Decision-making details documented in ED Course.    Risk  OTC drugs.  Prescription drug management.  Risk Details: Lab workup with leukocytosis with WBC of 21  CMP with a blood glucose of 148  Urinalysis with no signs of infection  CT abdomen pelvis shows acute appendicitis without abscess or perforation    Spoke to Dr. Bailey-general surgeon. IV Zosyn initiated.  Will admit with plan to take patient to the OR tomorrow.    Spoke to Dr. Andersen- [facility] who will admit patient.                                        Clinical Impression:  Final diagnoses:  [R10.9] Abdominal pain  [K35.30] Acute appendicitis with localized peritonitis, without perforation, abscess, or gangrene (Primary)          ED Disposition Condition    Observation                       [1]   Social History  Tobacco Use    Smoking status: Every Day     Current packs/day: 2.50     Average packs/day: 2.5 packs/day for 36.5 years (91.1 ttl pk-yrs)     Types: Cigarettes     Start date: 1989    Smokeless tobacco: Never    Tobacco comments:     Tobacco cessation   Substance Use Topics    Alcohol use: No     Alcohol/week: 0.0 standard drinks of alcohol    Drug use: No     Comment: 30YEARS AGO, PATIENT ON PRESCRIBED PAIN MEDICATION.         Inessa Huddleston NP  06/17/25 [id number]

## 2025-06-17 NOTE — NURSING
Pt arrived via ED wheelchair to room 310. Pt VSS, free of falls, NADN, and oriented to room and call bell system, and call bell within reach. Continuous fluids noted at 75 ml/hr of normal saline. Received handoff from GABRIELLA Willis and Mandi, Nurse intern.

## 2025-06-17 NOTE — ASSESSMENT & PLAN NOTE
Patient's blood pressure range in the last 24 hours was: BP  Min: 124/69  Max: 138/80.The patient's inpatient anti-hypertensive regimen is listed below:  Current Antihypertensives     Blood pressure is well controlled he is NPO we will hold meds for now

## 2025-06-18 ENCOUNTER — ANESTHESIA (OUTPATIENT)
Dept: SURGERY | Facility: HOSPITAL | Age: 60
End: 2025-06-18
Payer: MEDICAID

## 2025-06-18 ENCOUNTER — ANESTHESIA EVENT (OUTPATIENT)
Dept: SURGERY | Facility: HOSPITAL | Age: 60
End: 2025-06-18
Payer: MEDICAID

## 2025-06-18 LAB
ABSOLUTE EOSINOPHIL (OHS): 0.06 K/UL
ABSOLUTE MONOCYTE (OHS): 1.81 K/UL (ref 0.3–1)
ABSOLUTE NEUTROPHIL COUNT (OHS): 17.19 K/UL (ref 1.8–7.7)
ALBUMIN SERPL BCP-MCNC: 3.6 G/DL (ref 3.5–5.2)
ALP SERPL-CCNC: 80 UNIT/L (ref 40–150)
ALT SERPL W/O P-5'-P-CCNC: 20 UNIT/L (ref 10–44)
ANION GAP (OHS): 11 MMOL/L (ref 8–16)
AST SERPL-CCNC: 15 UNIT/L (ref 11–45)
BASOPHILS # BLD AUTO: 0.07 K/UL
BASOPHILS NFR BLD AUTO: 0.3 %
BILIRUB SERPL-MCNC: 1.3 MG/DL (ref 0.1–1)
BUN SERPL-MCNC: 17 MG/DL (ref 8–23)
CALCIUM SERPL-MCNC: 9 MG/DL (ref 8.7–10.5)
CHLORIDE SERPL-SCNC: 103 MMOL/L (ref 95–110)
CO2 SERPL-SCNC: 19 MMOL/L (ref 23–29)
CREAT SERPL-MCNC: 0.8 MG/DL (ref 0.5–1.4)
ERYTHROCYTE [DISTWIDTH] IN BLOOD BY AUTOMATED COUNT: 13.9 % (ref 11.5–14.5)
GFR SERPLBLD CREATININE-BSD FMLA CKD-EPI: >60 ML/MIN/1.73/M2
GLUCOSE SERPL-MCNC: 92 MG/DL (ref 70–110)
HCT VFR BLD AUTO: 47.3 % (ref 40–54)
HGB BLD-MCNC: 16 GM/DL (ref 14–18)
IMM GRANULOCYTES # BLD AUTO: 0.14 K/UL (ref 0–0.04)
IMM GRANULOCYTES NFR BLD AUTO: 0.6 % (ref 0–0.5)
LYMPHOCYTES # BLD AUTO: 2.73 K/UL (ref 1–4.8)
MCH RBC QN AUTO: 28.8 PG (ref 27–31)
MCHC RBC AUTO-ENTMCNC: 33.8 G/DL (ref 32–36)
MCV RBC AUTO: 85 FL (ref 82–98)
NUCLEATED RBC (/100WBC) (OHS): 0 /100 WBC
OHS QRS DURATION: 88 MS
OHS QTC CALCULATION: 415 MS
PLATELET # BLD AUTO: 216 K/UL (ref 150–450)
PMV BLD AUTO: 8.9 FL (ref 9.2–12.9)
POCT GLUCOSE: 117 MG/DL (ref 70–110)
POCT GLUCOSE: 176 MG/DL (ref 70–110)
POCT GLUCOSE: 266 MG/DL (ref 70–110)
POCT GLUCOSE: 91 MG/DL (ref 70–110)
POCT GLUCOSE: 93 MG/DL (ref 70–110)
POTASSIUM SERPL-SCNC: 3.9 MMOL/L (ref 3.5–5.1)
PROT SERPL-MCNC: 7.3 GM/DL (ref 6–8.4)
RBC # BLD AUTO: 5.56 M/UL (ref 4.6–6.2)
RELATIVE EOSINOPHIL (OHS): 0.3 %
RELATIVE LYMPHOCYTE (OHS): 12.4 % (ref 18–48)
RELATIVE MONOCYTE (OHS): 8.2 % (ref 4–15)
RELATIVE NEUTROPHIL (OHS): 78.2 % (ref 38–73)
SODIUM SERPL-SCNC: 133 MMOL/L (ref 136–145)
WBC # BLD AUTO: 22 K/UL (ref 3.9–12.7)

## 2025-06-18 PROCEDURE — 63600175 PHARM REV CODE 636 W HCPCS: Performed by: NURSE PRACTITIONER

## 2025-06-18 PROCEDURE — 71000033 HC RECOVERY, INTIAL HOUR: Performed by: SURGERY

## 2025-06-18 PROCEDURE — 99900035 HC TECH TIME PER 15 MIN (STAT)

## 2025-06-18 PROCEDURE — 37000009 HC ANESTHESIA EA ADD 15 MINS: Performed by: SURGERY

## 2025-06-18 PROCEDURE — 94761 N-INVAS EAR/PLS OXIMETRY MLT: CPT

## 2025-06-18 PROCEDURE — 36000708 HC OR TIME LEV III 1ST 15 MIN: Performed by: SURGERY

## 2025-06-18 PROCEDURE — 88304 TISSUE EXAM BY PATHOLOGIST: CPT | Mod: TC | Performed by: SURGERY

## 2025-06-18 PROCEDURE — 11000001 HC ACUTE MED/SURG PRIVATE ROOM

## 2025-06-18 PROCEDURE — 36415 COLL VENOUS BLD VENIPUNCTURE: CPT | Performed by: NURSE PRACTITIONER

## 2025-06-18 PROCEDURE — 85025 COMPLETE CBC W/AUTO DIFF WBC: CPT | Performed by: NURSE PRACTITIONER

## 2025-06-18 PROCEDURE — 0DTJ4ZZ RESECTION OF APPENDIX, PERCUTANEOUS ENDOSCOPIC APPROACH: ICD-10-PCS | Performed by: SURGERY

## 2025-06-18 PROCEDURE — 27000221 HC OXYGEN, UP TO 24 HOURS

## 2025-06-18 PROCEDURE — 25000003 PHARM REV CODE 250: Performed by: SURGERY

## 2025-06-18 PROCEDURE — 36000709 HC OR TIME LEV III EA ADD 15 MIN: Performed by: SURGERY

## 2025-06-18 PROCEDURE — 63600175 PHARM REV CODE 636 W HCPCS: Mod: JZ,TB | Performed by: NURSE ANESTHETIST, CERTIFIED REGISTERED

## 2025-06-18 PROCEDURE — 37000008 HC ANESTHESIA 1ST 15 MINUTES: Performed by: SURGERY

## 2025-06-18 PROCEDURE — 71000039 HC RECOVERY, EACH ADD'L HOUR: Performed by: SURGERY

## 2025-06-18 PROCEDURE — 25000003 PHARM REV CODE 250: Performed by: NURSE ANESTHETIST, CERTIFIED REGISTERED

## 2025-06-18 PROCEDURE — 25000003 PHARM REV CODE 250: Performed by: INTERNAL MEDICINE

## 2025-06-18 PROCEDURE — 25000003 PHARM REV CODE 250: Performed by: NURSE PRACTITIONER

## 2025-06-18 PROCEDURE — 88304 TISSUE EXAM BY PATHOLOGIST: CPT | Mod: 26,,, | Performed by: PATHOLOGY

## 2025-06-18 PROCEDURE — 27201423 OPTIME MED/SURG SUP & DEVICES STERILE SUPPLY: Performed by: SURGERY

## 2025-06-18 PROCEDURE — 63600175 PHARM REV CODE 636 W HCPCS: Performed by: FAMILY MEDICINE

## 2025-06-18 PROCEDURE — 80053 COMPREHEN METABOLIC PANEL: CPT | Performed by: NURSE PRACTITIONER

## 2025-06-18 RX ORDER — FENTANYL CITRATE 50 UG/ML
INJECTION, SOLUTION INTRAMUSCULAR; INTRAVENOUS
Status: DISCONTINUED | OUTPATIENT
Start: 2025-06-18 | End: 2025-06-18

## 2025-06-18 RX ORDER — HYDROMORPHONE HYDROCHLORIDE 1 MG/ML
INJECTION, SOLUTION INTRAMUSCULAR; INTRAVENOUS; SUBCUTANEOUS
Status: DISCONTINUED | OUTPATIENT
Start: 2025-06-18 | End: 2025-06-18

## 2025-06-18 RX ORDER — HYDROCODONE BITARTRATE AND ACETAMINOPHEN 7.5; 325 MG/1; MG/1
1 TABLET ORAL EVERY 6 HOURS PRN
Refills: 0 | Status: CANCELLED | OUTPATIENT
Start: 2025-06-18

## 2025-06-18 RX ORDER — IBUPROFEN 200 MG
16 TABLET ORAL
Status: DISCONTINUED | OUTPATIENT
Start: 2025-06-18 | End: 2025-06-20 | Stop reason: HOSPADM

## 2025-06-18 RX ORDER — ONDANSETRON HYDROCHLORIDE 2 MG/ML
INJECTION, SOLUTION INTRAMUSCULAR; INTRAVENOUS
Status: DISCONTINUED | OUTPATIENT
Start: 2025-06-18 | End: 2025-06-18

## 2025-06-18 RX ORDER — IBUPROFEN 200 MG
24 TABLET ORAL
Status: DISCONTINUED | OUTPATIENT
Start: 2025-06-18 | End: 2025-06-20 | Stop reason: HOSPADM

## 2025-06-18 RX ORDER — SUCCINYLCHOLINE CHLORIDE 20 MG/ML
INJECTION INTRAMUSCULAR; INTRAVENOUS
Status: DISCONTINUED | OUTPATIENT
Start: 2025-06-18 | End: 2025-06-18

## 2025-06-18 RX ORDER — ROCURONIUM BROMIDE 10 MG/ML
INJECTION, SOLUTION INTRAVENOUS
Status: DISCONTINUED | OUTPATIENT
Start: 2025-06-18 | End: 2025-06-18

## 2025-06-18 RX ORDER — BUPIVACAINE HYDROCHLORIDE AND EPINEPHRINE 5; 5 MG/ML; UG/ML
INJECTION, SOLUTION EPIDURAL; INTRACAUDAL; PERINEURAL
Status: DISCONTINUED | OUTPATIENT
Start: 2025-06-18 | End: 2025-06-18 | Stop reason: HOSPADM

## 2025-06-18 RX ORDER — MIDAZOLAM HYDROCHLORIDE 1 MG/ML
INJECTION INTRAMUSCULAR; INTRAVENOUS
Status: DISCONTINUED | OUTPATIENT
Start: 2025-06-18 | End: 2025-06-18

## 2025-06-18 RX ORDER — ACETAMINOPHEN 325 MG/1
650 TABLET ORAL EVERY 4 HOURS PRN
Status: DISCONTINUED | OUTPATIENT
Start: 2025-06-18 | End: 2025-06-20 | Stop reason: HOSPADM

## 2025-06-18 RX ORDER — PROPOFOL 10 MG/ML
VIAL (ML) INTRAVENOUS
Status: DISCONTINUED | OUTPATIENT
Start: 2025-06-18 | End: 2025-06-18

## 2025-06-18 RX ORDER — ACETAMINOPHEN 10 MG/ML
INJECTION, SOLUTION INTRAVENOUS
Status: DISCONTINUED | OUTPATIENT
Start: 2025-06-18 | End: 2025-06-18

## 2025-06-18 RX ORDER — LIDOCAINE HYDROCHLORIDE 20 MG/ML
INJECTION, SOLUTION EPIDURAL; INFILTRATION; INTRACAUDAL; PERINEURAL
Status: DISCONTINUED | OUTPATIENT
Start: 2025-06-18 | End: 2025-06-18

## 2025-06-18 RX ORDER — INSULIN ASPART 100 [IU]/ML
0-5 INJECTION, SOLUTION INTRAVENOUS; SUBCUTANEOUS
Status: DISCONTINUED | OUTPATIENT
Start: 2025-06-18 | End: 2025-06-18

## 2025-06-18 RX ORDER — INSULIN ASPART 100 [IU]/ML
0-5 INJECTION, SOLUTION INTRAVENOUS; SUBCUTANEOUS
Status: DISCONTINUED | OUTPATIENT
Start: 2025-06-18 | End: 2025-06-20 | Stop reason: HOSPADM

## 2025-06-18 RX ORDER — KETOROLAC TROMETHAMINE 30 MG/ML
INJECTION, SOLUTION INTRAMUSCULAR; INTRAVENOUS
Status: DISCONTINUED | OUTPATIENT
Start: 2025-06-18 | End: 2025-06-18

## 2025-06-18 RX ORDER — GLUCAGON 1 MG
1 KIT INJECTION
Status: DISCONTINUED | OUTPATIENT
Start: 2025-06-18 | End: 2025-06-20 | Stop reason: HOSPADM

## 2025-06-18 RX ORDER — DEXAMETHASONE SODIUM PHOSPHATE 4 MG/ML
INJECTION, SOLUTION INTRA-ARTICULAR; INTRALESIONAL; INTRAMUSCULAR; INTRAVENOUS; SOFT TISSUE
Status: DISCONTINUED | OUTPATIENT
Start: 2025-06-18 | End: 2025-06-18

## 2025-06-18 RX ADMIN — CEFAZOLIN 2 G: 1 INJECTION, POWDER, FOR SOLUTION INTRAVENOUS at 11:06

## 2025-06-18 RX ADMIN — INSULIN ASPART 1 UNITS: 100 INJECTION, SOLUTION INTRAVENOUS; SUBCUTANEOUS at 09:06

## 2025-06-18 RX ADMIN — SODIUM CHLORIDE: 0.9 INJECTION, SOLUTION INTRAVENOUS at 12:06

## 2025-06-18 RX ADMIN — SODIUM CHLORIDE: 0.9 INJECTION, SOLUTION INTRAVENOUS at 11:06

## 2025-06-18 RX ADMIN — PIPERACILLIN SODIUM AND TAZOBACTAM SODIUM 4.5 G: 4; .5 INJECTION, POWDER, LYOPHILIZED, FOR SOLUTION INTRAVENOUS at 03:06

## 2025-06-18 RX ADMIN — ONDANSETRON 4 MG: 2 INJECTION INTRAMUSCULAR; INTRAVENOUS at 11:06

## 2025-06-18 RX ADMIN — ROCURONIUM BROMIDE 40 MG: 10 INJECTION, SOLUTION INTRAVENOUS at 11:06

## 2025-06-18 RX ADMIN — HYDROMORPHONE HYDROCHLORIDE 0.5 MG: 1 INJECTION, SOLUTION INTRAMUSCULAR; INTRAVENOUS; SUBCUTANEOUS at 12:06

## 2025-06-18 RX ADMIN — LIDOCAINE HYDROCHLORIDE 100 MG: 20 INJECTION, SOLUTION EPIDURAL; INFILTRATION; INTRACAUDAL; PERINEURAL at 11:06

## 2025-06-18 RX ADMIN — DEXAMETHASONE SODIUM PHOSPHATE 4 MG: 4 INJECTION, SOLUTION INTRAMUSCULAR; INTRAVENOUS at 11:06

## 2025-06-18 RX ADMIN — SUCCINYLCHOLINE CHLORIDE 180 MG: 20 INJECTION, SOLUTION INTRAMUSCULAR; INTRAVENOUS at 11:06

## 2025-06-18 RX ADMIN — MORPHINE SULFATE 4 MG: 4 INJECTION INTRAVENOUS at 07:06

## 2025-06-18 RX ADMIN — MORPHINE SULFATE 2 MG: 2 INJECTION, SOLUTION INTRAMUSCULAR; INTRAVENOUS at 06:06

## 2025-06-18 RX ADMIN — MORPHINE SULFATE 4 MG: 4 INJECTION INTRAVENOUS at 10:06

## 2025-06-18 RX ADMIN — ROCURONIUM BROMIDE 10 MG: 10 INJECTION, SOLUTION INTRAVENOUS at 11:06

## 2025-06-18 RX ADMIN — PIPERACILLIN SODIUM AND TAZOBACTAM SODIUM 4.5 G: 4; .5 INJECTION, POWDER, LYOPHILIZED, FOR SOLUTION INTRAVENOUS at 10:06

## 2025-06-18 RX ADMIN — ACETAMINOPHEN 1000 MG: 10 INJECTION, SOLUTION INTRAVENOUS at 11:06

## 2025-06-18 RX ADMIN — FENTANYL CITRATE 25 MCG: 0.05 INJECTION, SOLUTION INTRAMUSCULAR; INTRAVENOUS at 11:06

## 2025-06-18 RX ADMIN — KETOROLAC TROMETHAMINE 30 MG: 30 INJECTION, SOLUTION INTRAMUSCULAR at 12:06

## 2025-06-18 RX ADMIN — MIDAZOLAM HYDROCHLORIDE 2 MG: 1 INJECTION, SOLUTION INTRAMUSCULAR; INTRAVENOUS at 11:06

## 2025-06-18 RX ADMIN — MORPHINE SULFATE 2 MG: 2 INJECTION, SOLUTION INTRAMUSCULAR; INTRAVENOUS at 03:06

## 2025-06-18 RX ADMIN — PROPOFOL 150 MG: 10 INJECTION, EMULSION INTRAVENOUS at 11:06

## 2025-06-18 RX ADMIN — FENTANYL CITRATE 50 MCG: 0.05 INJECTION, SOLUTION INTRAMUSCULAR; INTRAVENOUS at 11:06

## 2025-06-18 RX ADMIN — PIPERACILLIN SODIUM AND TAZOBACTAM SODIUM 4.5 G: 4; .5 INJECTION, POWDER, LYOPHILIZED, FOR SOLUTION INTRAVENOUS at 06:06

## 2025-06-18 RX ADMIN — SUGAMMADEX 200 MG: 100 INJECTION, SOLUTION INTRAVENOUS at 12:06

## 2025-06-18 NOTE — PROGRESS NOTES
[facility] Surg ([facility])  [facility] Medicine  Progress Note    Patient Name: Ross Crystal  MRN: [id number]  Patient Class: OP- Observation   Admission Date: 6/17/2025  Length of Stay: 0 days  Attending Physician: Kristopher Andersen MD  Primary Care Provider: Ross Valverde MD        Subjective     Principal Problem:<principal problem not specified>        HPI:  Ross Crystal is a 60 y.o. male  with a past medical history of T2DM, HTN, HLD, Neuropathy here for for acute appendicitis.  He reports right lower quadrant pain for 2 days.  The pain has been getting worse, 8/10 in intensity, worse with movement and coughing.  He denies vomiting.  Some nausea is reported.  Workup in the emergency room showed leukocytosis with white count more than 21,000.  CT scan showed The appendix is dilated and fluid-filled, and there is surrounding inflammatory fat stranding.  Findings are consistent with acute appendicitis.  There is no abscess or perforation.  The appendix is noted to be retrocecal in location, located in the right mid abdomen.  No inflammation is seen elsewhere in the bowel.  There are few distal colonic diverticula.  There is no free intraperitoneal fluid or air.   Acute appendicitis without abscess or perforation.    I saw him on 3rd floor where he has been admitted to hospital service after surgical consultation.  He has been placed on OR list for tomorrow morning.  He will be NPO.  Pain control.  IV Zosyn.    Overview/Hospital Course:  Admitted for appendicitis.  IV fluids, IV zosyn and IV pain control.  General surgery consulted for appendectomy.      Past Medical History:   Diagnosis Date    Benign paroxysmal vertigo, bilateral     Diabetes mellitus     Diabetes mellitus type I     Hyperlipemia     Renal disorder     Urinary tract infection        Past Surgical History:   Procedure Laterality Date    CARPAL TUNNEL RELEASE  25 YEARS AGO    COLONOSCOPY      COLONOSCOPY N/A 1/25/2016    Procedure: COLONOSCOPY;   Surgeon: Luis Bogran-Reyes, MD;  Location: [facility];  Service: Endoscopy;  Laterality: N/A;    DEBRIDEMENT, PHALANX, HAND Left 12/29/2022    Procedure: DEBRIDEMENT, PHALANX, HAND;  Surgeon: Abdon Reid Jr., MD;  Location: [facility];  Service: Orthopedics;  Laterality: Left;    DEBRIDEMENT, PHALANX, HAND Left 2/20/2024    Procedure: DEBRIDEMENT, PHALANX, HAND;  Surgeon: Abdon Reid Jr., MD;  Location: [facility] OR;  Service: Orthopedics;  Laterality: Left;    ESOPHAGOGASTRODUODENOSCOPY      HERNIA REPAIR      HERNIA REPAIR         Review of patient's allergies indicates:   Allergen Reactions    Sulfamethoxazole      Other Reaction(s): hives rash    Trimethoprim      Other Reaction(s): hives rash    Bactrim [sulfamethoxazole-trimethoprim] Rash       No current facility-administered medications on file prior to encounter.     Current Outpatient Medications on File Prior to Encounter   Medication Sig    aspirin (ECOTRIN) 81 MG EC tablet Take 81 mg by mouth once daily.    atorvastatin (LIPITOR) 80 MG tablet Take 80 mg by mouth.    cyclobenzaprine (FLEXERIL) 10 MG tablet Take 10 mg by mouth.    empagliflozin (JARDIANCE) 25 mg tablet Take 1 tablet (25 mg total) by mouth once daily.    finasteride (PROSCAR) 5 mg tablet Take 1 tablet (5 mg total) by mouth once daily.    fish oil-omega-3 fatty acids 300-1,000 mg capsule Take 1 capsule by mouth 2 (two) times a day.    HYDROcodone-acetaminophen (NORCO) 5-325 mg per tablet Take 1 tablet by mouth every 4 (four) hours as needed for Pain.    insulin aspart U-100 (NOVOLOG FLEXPEN U-100 INSULIN) 100 unit/mL (3 mL) InPn pen Inject 7 Units into the skin 3 (three) times daily with meals.    insulin glargine U-100, Lantus, 100 unit/mL (3 mL) SubQ InPn pen Inject 14 Units into the skin once daily.    tamsulosin (FLOMAX) 0.4 mg Cap Take 1 capsule (0.4 mg total) by mouth 2 (two) times a day.    BAQSIMI 3 mg/actuation [place] 3 mg by Nasal route.    BD ULTRA-FINE JENNY PEN NEEDLE 32 gauge x  "5/32" Ndle 4 (four) times daily.    BD ULTRA-FINE JENNY PEN NEEDLE 32 gauge x 5/32" Ndle To inject insulin 4 times daily    blood sugar diagnostic (TRUE METRIX GLUCOSE TEST STRIP) Strp Check BG three times daily    blood-glucose sensor (FREESTYLE JULIO 3 PLUS SENSOR) Sadaf To use for glucose monitoring every 15 days.    flash glucose scanning reader (FREESTYLE JULIO 2 READER) Misc Use as instructed for blood glucose monitoring.    flash glucose sensor (FREESTYLE JULIO 14 DAY SENSOR) Kit Inject 1 each into the skin.    FREESTYLE UJLIO 2 SENSOR Kit CHANGE EVERY 14 DAYS FOR BLOOD GLUCOSE MONITORING    FREESTYLE JULIO 3 READER Misc 1 Device by Misc.(Non-Drug; Combo Route) route once. for 1 dose    insulin syringe-needle U-100 1 mL 31 gauge x 5/16 Syrg     meclizine (ANTIVERT) 50 MG tablet Take 50 mg by mouth 2 (two) times daily.    meloxicam (MOBIC) 15 MG tablet Take 15 mg by mouth.    pravastatin (PRAVACHOL) 40 MG tablet Take 40 mg by mouth every evening.    TRUE METRIX GLUCOSE TEST STRIP Strp 1 strip 4 (four) times daily.    TRUEPLUS LANCETS 33 gauge Misc USE TO TEST BLOOD SUGAR TWO TIMES DAILY     Family History       Problem Relation (Age of Onset)    Alzheimer's disease Mother    Cancer Father          Tobacco Use    Smoking status: Every Day     Current packs/day: 2.50     Average packs/day: 2.5 packs/day for 36.5 years (91.2 ttl pk-yrs)     Types: Cigarettes     Start date: 1989    Smokeless tobacco: Never    Tobacco comments:     Tobacco cessation   Substance and Sexual Activity    Alcohol use: No     Alcohol/week: 0.0 standard drinks of alcohol    Drug use: No     Comment: 30YEARS AGO, PATIENT ON PRESCRIBED PAIN MEDICATION.     Sexual activity: Not Currently     Review of Systems   Constitutional:  Negative for chills and fever.   HENT:  Negative for congestion, postnasal drip and sore throat.    Eyes:  Negative for photophobia.   Respiratory:  Negative for chest tightness and shortness of breath.  "   Cardiovascular:  Negative for chest pain.   Gastrointestinal:  Positive for abdominal pain and nausea. Negative for abdominal distention, blood in stool and vomiting.   Genitourinary:  Negative for dysuria, flank pain and hematuria.   Musculoskeletal:  Negative for back pain.   Skin:  Negative for pallor.   Neurological:  Negative for dizziness, seizures, facial asymmetry, speech difficulty and numbness.   Hematological:  Does not bruise/bleed easily.   Psychiatric/Behavioral:  Negative for agitation and suicidal ideas. The patient is not nervous/anxious.      Objective:     Vital Signs (Most Recent):  Temp: 97.8 °F (36.6 °C) (06/18/25 0724)  Pulse: 87 (06/18/25 0800)  Resp: 19 (06/18/25 0744)  BP: 119/72 (06/18/25 0724)  SpO2: (!) 94 % (06/18/25 0736) Vital Signs (24h Range):  Temp:  [97.8 °F (36.6 °C)-100.2 °F (37.9 °C)] 97.8 °F (36.6 °C)  Pulse:  [] 87  Resp:  [16-20] 19  SpO2:  [91 %-97 %] 94 %  BP: (119-144)/(67-80) 119/72     Weight: 71.3 kg (157 lb 3 oz)  Body mass index is 27.84 kg/m².     Physical Exam  Vitals and nursing note reviewed.   Constitutional:       Appearance: He is well-developed.   HENT:      Head: Normocephalic and atraumatic.      Nose: Nose normal.   Eyes:      Conjunctiva/sclera: Conjunctivae normal.      Pupils: Pupils are equal, round, and reactive to light.   Neck:      Thyroid: No thyromegaly.      Vascular: No JVD.   Cardiovascular:      Rate and Rhythm: Normal rate and regular rhythm.      Heart sounds: Normal heart sounds.   Pulmonary:      Effort: Pulmonary effort is normal.      Breath sounds: Normal breath sounds.   Abdominal:      General: Bowel sounds are normal. There is no distension.      Palpations: Abdomen is soft. There is no mass.      Tenderness: There is abdominal tenderness in the right lower quadrant. There is guarding and rebound.       Musculoskeletal:         General: Normal range of motion.      Cervical back: Normal range of motion and neck supple.    Lymphadenopathy:      Cervical: No cervical adenopathy.   Skin:     General: Skin is warm and dry.      Coloration: Skin is not pale.      Findings: No rash.   Neurological:      Mental Status: He is alert and oriented to person, place, and time.      Cranial Nerves: No cranial nerve deficit.      Deep Tendon Reflexes: Reflexes are normal and symmetric.              CRANIAL NERVES     CN III, IV, VI   Pupils are equal, round, and reactive to light.       Significant Labs: All pertinent labs within the past 24 hours have been reviewed.  A1C:   Recent Labs   Lab 02/11/25  1830 06/17/25  1804   HGBA1C 7.5* 7.3*     CBC:   Recent Labs   Lab 06/17/25  1337 06/18/25  0420   WBC 21.31* 22.00*   HGB 17.2 16.0   HCT 50.4 47.3    216     CMP:   Recent Labs   Lab 06/17/25  1337 06/18/25  0420   * 133*   K 4.1 3.9   CL 99 103   CO2 22* 19*   * 92   BUN 23 17   CREATININE 1.0 0.8   CALCIUM 10.3 9.0   PROT 8.0 7.3   ALBUMIN 4.0 3.6   BILITOT 0.7 1.3*   ALKPHOS 103 80   AST 18 15   ALT 26 20   ANIONGAP 12 11     Troponin:   Recent Labs   Lab 06/17/25  1337   TROPONINI <0.006     Urine Studies:   Recent Labs   Lab 06/17/25  1351   COLORU Yellow   APPEARANCEUA Clear   PHUR 6.0   SPECGRAV <=1.005*   PROTEINUA Negative   GLUCUA 2+*   BILIRUBINUA Negative   OCCULTUA Negative   NITRITE Negative   UROBILINOGEN Negative   LEUKOCYTESUR Negative       Significant Imaging: I have reviewed all pertinent imaging results/findings within the past 24 hours.  CT: I have reviewed all pertinent results/findings within the past 24 hours and my personal findings are:  The appendix is dilated and fluid-filled, and there is surrounding inflammatory fat stranding.  Findings are consistent with acute appendicitis.  There is no abscess or perforation.  The appendix is noted to be retrocecal in location, located in the right mid abdomen.  No inflammation is seen elsewhere in the bowel.  There are few distal colonic diverticula.  There  is no free intraperitoneal fluid or air.      Assessment & Plan  Essential hypertension  Patient's blood pressure range in the last 24 hours was: BP  Min: 119/72  Max: 144/69.The patient's inpatient anti-hypertensive regimen is listed below:  Current Antihypertensives     Blood pressure is well controlled he is NPO we will hold meds for now  Pure hypercholesterolemia  He is on statin.  Hold for now    Type 2 diabetes mellitus without complication  Patient's FSGs are uncontrolled due to hyperglycemia on current medication regimen.  Last A1c reviewed-   Lab Results   Component Value Date    HGBA1C 7.3 (H) 06/17/2025     Most recent fingerstick glucose reviewed-   Recent Labs   Lab 06/17/25  1750 06/18/25  0026 06/18/25  0605   POCTGLUCOSE 108 117* 91     Current correctional scale  Low  Maintain anti-hyperglycemic dose as follows-   Antihyperglycemics (From admission, onward)      Start     Stop Route Frequency Ordered    06/17/25 1802  insulin aspart U-100 pen 0-5 Units         -- SubQ Every 6 hours PRN 06/17/25 1702          Hold Oral hypoglycemics while patient is in the hospital.  BPH (benign prostatic hyperplasia)  He is on tamsulosin.  Hold for now as he is NPO    Acute appendicitis with localized peritonitis, without perforation, abscess, or gangrene  NPO   IV fluids   Pain control  IV Zosyn  General surgery consulted     VTE Risk Mitigation (From admission, onward)           Ordered     IP VTE HIGH RISK PATIENT  Once         06/17/25 1635     Place sequential compression device  Until discontinued         06/17/25 1635                    Discharge Planning   NAZ:      Code Status: Full Code   Medical Readiness for Discharge Date:                            Sherie Lezama PA-C  Department of Hospital Medicine   [facility] (3rd Fl)

## 2025-06-18 NOTE — OP NOTE
DATE OF PROCEDURE: 6/18/2025     PREOPERATIVE DIAGNOSES:   Acute appendicitis with localized peritonitis, without perforation, abscess, or gangrene [K35.30]    POSTOPERATIVE DIAGNOSES:   Acute appendicitis with localized peritonitis, without perforation, abscess, or gangrene [K35.30]    PROCEDURES PERFORMED:   Procedure(s) (LRB):  APPENDECTOMY, LAPAROSCOPIC (N/A)    Surgeons and Role:     * Jamshid Bailey MD - Primary     ANESTHESIA: General.     BLOOD LOSS: Minimal.     SPECIMENS: appendix     FINDINGS: gangrene without jessica perforation          INDICATIONS: The patient is a 59 yo male and was diagnosed with acute appendicitis. Consent was  obtained from the patient. The patient was taken to the Operating Room and  had  already received a dose of zosyn in the Emergency Room. She was taken to  the Operating Room and placed under general anesthesia. Prior to induction  of general anesthesia, she had sequential compression devices placed on the  lower extremities for DVT prophylaxis. Stab incision was made in the  umbilicus. Veress needle was inserted. Pneumoperitoneum was established.   The abdomen was accessed with a 5 mm Optiview trocar. A 12 mm trocar was  placed in the right lower quadrant, another 5 mm trocar in the lower  midline. Instruments were inserted. Upon entering the abdomen, there was  no evidence of perforated appendicitis. Right lower quadrant was examined.  The appendix was identified in its usual location. It was acutely  hyperemic and enlarged with gangrene but no evidence of perforation. A  small mesenteric window was created at the base of the appendix, through  which a 35 mm endoscopic stapler was used to divide the appendix at its  base. Mesentery was divided with electrocautery for hemostasis. The  appendix was removed through the right lower quadrant port site. Right  lower quadrant was inspected. Staple line was intact and noted to be  hemostatic. The cut mesentery was hemostatic,  as well. Evacuated any  remaining irrigation. Evacuated the pneumoperitoneum. Trocars were  removed under direct visualization. There was no bleeding noted from the  port site. Skin was injected with 0.5 Marcaine with epinephrine for local  anesthesia, closed with 4-0 Vicryl in a subcuticular fashion, dressed with  Steri-Strips and Band-Aid. At the end of the case, sponge and instrument  counts were correct. Tolerated well. Extubated in Surgery, taken to  Recovery Room in good condition.

## 2025-06-18 NOTE — ASSESSMENT & PLAN NOTE
Patient's FSGs are uncontrolled due to hyperglycemia on current medication regimen.  Last A1c reviewed-   Lab Results   Component Value Date    HGBA1C 7.3 (H) 06/17/2025     Most recent fingerstick glucose reviewed-   Recent Labs   Lab 06/17/25  1750 06/18/25  0026 06/18/25  0605   POCTGLUCOSE 108 117* 91     Current correctional scale  Low  Maintain anti-hyperglycemic dose as follows-   Antihyperglycemics (From admission, onward)      Start     Stop Route Frequency Ordered    06/17/25 1802  insulin aspart U-100 pen 0-5 Units         -- SubQ Every 6 hours PRN 06/17/25 1702          Hold Oral hypoglycemics while patient is in the hospital.

## 2025-06-18 NOTE — SUBJECTIVE & OBJECTIVE
Past Medical History:   Diagnosis Date    Benign paroxysmal vertigo, bilateral     Diabetes mellitus     Diabetes mellitus type I     Hyperlipemia     Renal disorder     Urinary tract infection        Past Surgical History:   Procedure Laterality Date    CARPAL TUNNEL RELEASE  25 YEARS AGO    COLONOSCOPY      COLONOSCOPY N/A 1/25/2016    Procedure: COLONOSCOPY;  Surgeon: Luis Bogran-Reyes, MD;  Location: [facility];  Service: Endoscopy;  Laterality: N/A;    DEBRIDEMENT, PHALANX, HAND Left 12/29/2022    Procedure: DEBRIDEMENT, PHALANX, HAND;  Surgeon: Abdon Reid Jr., MD;  Location: [facility];  Service: Orthopedics;  Laterality: Left;    DEBRIDEMENT, PHALANX, HAND Left 2/20/2024    Procedure: DEBRIDEMENT, PHALANX, HAND;  Surgeon: Abdon Reid Jr., MD;  Location: [facility];  Service: Orthopedics;  Laterality: Left;    ESOPHAGOGASTRODUODENOSCOPY      HERNIA REPAIR      HERNIA REPAIR         Review of patient's allergies indicates:   Allergen Reactions    Sulfamethoxazole      Other Reaction(s): hives rash    Trimethoprim      Other Reaction(s): hives rash    Bactrim [sulfamethoxazole-trimethoprim] Rash       No current facility-administered medications on file prior to encounter.     Current Outpatient Medications on File Prior to Encounter   Medication Sig    aspirin (ECOTRIN) 81 MG EC tablet Take 81 mg by mouth once daily.    atorvastatin (LIPITOR) 80 MG tablet Take 80 mg by mouth.    cyclobenzaprine (FLEXERIL) 10 MG tablet Take 10 mg by mouth.    empagliflozin (JARDIANCE) 25 mg tablet Take 1 tablet (25 mg total) by mouth once daily.    finasteride (PROSCAR) 5 mg tablet Take 1 tablet (5 mg total) by mouth once daily.    fish oil-omega-3 fatty acids 300-1,000 mg capsule Take 1 capsule by mouth 2 (two) times a day.    HYDROcodone-acetaminophen (NORCO) 5-325 mg per tablet Take 1 tablet by mouth every 4 (four) hours as needed for Pain.    insulin aspart U-100 (NOVOLOG FLEXPEN U-100 INSULIN) 100 unit/mL (3 mL) InPn  "pen Inject 7 Units into the skin 3 (three) times daily with meals.    insulin glargine U-100, Lantus, 100 unit/mL (3 mL) SubQ InPn pen Inject 14 Units into the skin once daily.    tamsulosin (FLOMAX) 0.4 mg Cap Take 1 capsule (0.4 mg total) by mouth 2 (two) times a day.    BAQSIMI 3 mg/actuation [place] 3 mg by Nasal route.    BD ULTRA-FINE JENNY PEN NEEDLE 32 gauge x 5/32" Ndle 4 (four) times daily.    BD ULTRA-FINE JENNY PEN NEEDLE 32 gauge x 5/32" Ndle To inject insulin 4 times daily    blood sugar diagnostic (TRUE METRIX GLUCOSE TEST STRIP) Strp Check BG three times daily    blood-glucose sensor (FREESTYLE JULIO 3 PLUS SENSOR) Sadaf To use for glucose monitoring every 15 days.    flash glucose scanning reader (FREESTYLE JULIO 2 READER) Misc Use as instructed for blood glucose monitoring.    flash glucose sensor (FREESTYLE JULIO 14 DAY SENSOR) Kit Inject 1 each into the skin.    FREESTYLE JULIO 2 SENSOR Kit CHANGE EVERY 14 DAYS FOR BLOOD GLUCOSE MONITORING    FREESTYLE JULIO 3 READER Misc 1 Device by Misc.(Non-Drug; Combo Route) route once. for 1 dose    insulin syringe-needle U-100 1 mL 31 gauge x 5/16 Syrg     meclizine (ANTIVERT) 50 MG tablet Take 50 mg by mouth 2 (two) times daily.    meloxicam (MOBIC) 15 MG tablet Take 15 mg by mouth.    pravastatin (PRAVACHOL) 40 MG tablet Take 40 mg by mouth every evening.    TRUE METRIX GLUCOSE TEST STRIP Strp 1 strip 4 (four) times daily.    TRUEPLUS LANCETS 33 gauge Misc USE TO TEST BLOOD SUGAR TWO TIMES DAILY     Family History       Problem Relation (Age of Onset)    Alzheimer's disease Mother    Cancer Father          Tobacco Use    Smoking status: Every Day     Current packs/day: 2.50     Average packs/day: 2.5 packs/day for 36.5 years (91.2 ttl pk-yrs)     Types: Cigarettes     Start date: 1989    Smokeless tobacco: Never    Tobacco comments:     Tobacco cessation   Substance and Sexual Activity    Alcohol use: No     Alcohol/week: 0.0 standard drinks of alcohol    Drug " use: No     Comment: 30YEARS AGO, PATIENT ON PRESCRIBED PAIN MEDICATION.     Sexual activity: Not Currently     Review of Systems   Constitutional:  Negative for chills and fever.   HENT:  Negative for congestion, postnasal drip and sore throat.    Eyes:  Negative for photophobia.   Respiratory:  Negative for chest tightness and shortness of breath.    Cardiovascular:  Negative for chest pain.   Gastrointestinal:  Positive for abdominal pain and nausea. Negative for abdominal distention, blood in stool and vomiting.   Genitourinary:  Negative for dysuria, flank pain and hematuria.   Musculoskeletal:  Negative for back pain.   Skin:  Negative for pallor.   Neurological:  Negative for dizziness, seizures, facial asymmetry, speech difficulty and numbness.   Hematological:  Does not bruise/bleed easily.   Psychiatric/Behavioral:  Negative for agitation and suicidal ideas. The patient is not nervous/anxious.      Objective:     Vital Signs (Most Recent):  Temp: 97.8 °F (36.6 °C) (06/18/25 0724)  Pulse: 87 (06/18/25 0800)  Resp: 19 (06/18/25 0744)  BP: 119/72 (06/18/25 0724)  SpO2: (!) 94 % (06/18/25 0736) Vital Signs (24h Range):  Temp:  [97.8 °F (36.6 °C)-100.2 °F (37.9 °C)] 97.8 °F (36.6 °C)  Pulse:  [] 87  Resp:  [16-20] 19  SpO2:  [91 %-97 %] 94 %  BP: (119-144)/(67-80) 119/72     Weight: 71.3 kg (157 lb 3 oz)  Body mass index is 27.84 kg/m².     Physical Exam  Vitals and nursing note reviewed.   Constitutional:       Appearance: He is well-developed.   HENT:      Head: Normocephalic and atraumatic.      Nose: Nose normal.   Eyes:      Conjunctiva/sclera: Conjunctivae normal.      Pupils: Pupils are equal, round, and reactive to light.   Neck:      Thyroid: No thyromegaly.      Vascular: No JVD.   Cardiovascular:      Rate and Rhythm: Normal rate and regular rhythm.      Heart sounds: Normal heart sounds.   Pulmonary:      Effort: Pulmonary effort is normal.      Breath sounds: Normal breath sounds.    Abdominal:      General: Bowel sounds are normal. There is no distension.      Palpations: Abdomen is soft. There is no mass.      Tenderness: There is abdominal tenderness in the right lower quadrant. There is guarding and rebound.       Musculoskeletal:         General: Normal range of motion.      Cervical back: Normal range of motion and neck supple.   Lymphadenopathy:      Cervical: No cervical adenopathy.   Skin:     General: Skin is warm and dry.      Coloration: Skin is not pale.      Findings: No rash.   Neurological:      Mental Status: He is alert and oriented to person, place, and time.      Cranial Nerves: No cranial nerve deficit.      Deep Tendon Reflexes: Reflexes are normal and symmetric.              CRANIAL NERVES     CN III, IV, VI   Pupils are equal, round, and reactive to light.       Significant Labs: All pertinent labs within the past 24 hours have been reviewed.  A1C:   Recent Labs   Lab 02/11/25  1830 06/17/25  1804   HGBA1C 7.5* 7.3*     CBC:   Recent Labs   Lab 06/17/25  1337 06/18/25  0420   WBC 21.31* 22.00*   HGB 17.2 16.0   HCT 50.4 47.3    216     CMP:   Recent Labs   Lab 06/17/25  1337 06/18/25  0420   * 133*   K 4.1 3.9   CL 99 103   CO2 22* 19*   * 92   BUN 23 17   CREATININE 1.0 0.8   CALCIUM 10.3 9.0   PROT 8.0 7.3   ALBUMIN 4.0 3.6   BILITOT 0.7 1.3*   ALKPHOS 103 80   AST 18 15   ALT 26 20   ANIONGAP 12 11     Troponin:   Recent Labs   Lab 06/17/25  1337   TROPONINI <0.006     Urine Studies:   Recent Labs   Lab 06/17/25  1351   COLORU Yellow   APPEARANCEUA Clear   PHUR 6.0   SPECGRAV <=1.005*   PROTEINUA Negative   GLUCUA 2+*   BILIRUBINUA Negative   OCCULTUA Negative   NITRITE Negative   UROBILINOGEN Negative   LEUKOCYTESUR Negative       Significant Imaging: I have reviewed all pertinent imaging results/findings within the past 24 hours.  CT: I have reviewed all pertinent results/findings within the past 24 hours and my personal findings are:  The  appendix is dilated and fluid-filled, and there is surrounding inflammatory fat stranding.  Findings are consistent with acute appendicitis.  There is no abscess or perforation.  The appendix is noted to be retrocecal in location, located in the right mid abdomen.  No inflammation is seen elsewhere in the bowel.  There are few distal colonic diverticula.  There is no free intraperitoneal fluid or air.

## 2025-06-18 NOTE — TRANSFER OF CARE
"Anesthesia Transfer of Care Note    Patient: Ross Crystal    Procedure(s) Performed: Procedure(s) (LRB):  APPENDECTOMY, LAPAROSCOPIC (N/A)    Patient location: PACU    Anesthesia Type: general    Transport from OR: Transported from OR on room air with adequate spontaneous ventilation    Post pain: adequate analgesia    Post assessment: no apparent anesthetic complications and tolerated procedure well    Post vital signs: stable    Level of consciousness: sedated    Nausea/Vomiting: no nausea/vomiting    Complications: none    Transfer of care protocol was followed      Last vitals: Visit Vitals  BP (!) 123/58   Pulse 109   Temp 36.3 °C (97.4 °F) (Skin)   Resp 16   Ht 5' 3" (1.6 m)   Wt 71.3 kg (157 lb 3 oz)   SpO2 96%   BMI 27.84 kg/m²     "

## 2025-06-18 NOTE — INTERVAL H&P NOTE
The patient has been examined and the H&P has been reviewed:        I concur with the findings and no changes have occurred since H&P was written.        Patient cleared for Anesthesia: General        Anesthesia/Surgery risks, benefits and alternative options discussed and understood by patient/family.      Active Hospital Problems    Diagnosis  POA    Acute appendicitis with localized peritonitis, without perforation, abscess, or gangrene [K35.30]  Yes    BPH (benign prostatic hyperplasia) [N40.0]  Yes    Essential hypertension [I10]  Yes    Pure hypercholesterolemia [E78.00]  Yes    Type 2 diabetes mellitus without complication [E11.9]  Yes      Resolved Hospital Problems   No resolved problems to display.

## 2025-06-18 NOTE — ANESTHESIA PROCEDURE NOTES
Intubation    Date/Time: 6/18/2025 11:42 AM    Performed by: Marvel Lovett CRNA  Authorized by: Marvel Lovett CRNA    Intubation:     Induction:  Intravenous    Intubated:  Postinduction    Mask Ventilation:  Easy mask    Attempts:  1    Attempted By:  Student    Method of Intubation:  Video laryngoscopy    Blade:  Steve 3    Laryngeal View Grade: Grade I - full view of cords      Difficult Airway Encountered?: No      Complications:  None    Airway Device:  Oral endotracheal tube    Airway Device Size:  7.5    Style/Cuff Inflation:  Cuffed (inflated to minimal occlusive pressure)    Tube secured:  22    Secured at:  The lips    Placement Verified By:  Capnometry    Complicating Factors:  None    Findings Post-Intubation:  BS equal bilateral and atraumatic/condition of teeth unchanged

## 2025-06-18 NOTE — NURSING
Patient arrived back to room 310 following laparoscopic appendectomy with surgery nurses. Patient with IV fluids running to gravity. Report received that patient has 3 lap sites with dermabond in place, clean and intact, pt received Ancef while in surgery, and is on 2L/NC related to low SpO2 in PACU but is ranging 94-97% with the oxygen. VSS, no reports of pain at this time, plan of care ongoing.

## 2025-06-18 NOTE — ASSESSMENT & PLAN NOTE
Patient's blood pressure range in the last 24 hours was: BP  Min: 119/72  Max: 144/69.The patient's inpatient anti-hypertensive regimen is listed below:  Current Antihypertensives     Blood pressure is well controlled he is NPO we will hold meds for now

## 2025-06-18 NOTE — ANESTHESIA POSTPROCEDURE EVALUATION
Anesthesia Post Evaluation    Patient: Ross Crystal    Procedure(s) Performed: Procedure(s) (LRB):  APPENDECTOMY, LAPAROSCOPIC (N/A)    Final Anesthesia Type: general      Patient location during evaluation: PACU  Patient participation: Yes- Able to Participate  Level of consciousness: awake and alert, oriented and awake  Post-procedure vital signs: reviewed and stable  Pain management: adequate  Airway patency: patent    PONV status at discharge: No PONV  Anesthetic complications: no      Cardiovascular status: blood pressure returned to baseline  Respiratory status: unassisted, spontaneous ventilation and room air  Hydration status: euvolemic  Follow-up not needed.  Comments: [facility]              Vitals Value Taken Time   /72 06/18/25 14:12   Temp 36.8 °C (98.2 °F) 06/18/25 13:21   Pulse 57 06/18/25 14:13   Resp 16 06/18/25 13:38   SpO2 98 % 06/18/25 14:13   Vitals shown include unfiled device data.      Event Time   Out of Recovery 06/18/2025 13:16:00         Pain/Jesús Score: Pain Rating Prior to Med Admin: 10 (6/18/2025  7:44 AM)  Pain Rating Post Med Admin: 3 (6/18/2025  8:00 AM)  Jesús Score: 9 (6/18/2025 12:50 PM)

## 2025-06-18 NOTE — H&P (VIEW-ONLY)
[facility] Surg (3rd Fl)  General Surgery  Consult Note    Inpatient consult to General Surgery  Consult performed by: Jamshid Bailey MD  Consult ordered by: Inessa Huddleston NP  Reason for consult: acute appendicitis  Assessment/Recommendations: To or for lap appendectomy.  Risks and benefits of procedure discussed with patient and family present including but not limited to major complications.  After all questions answered, patient agrees to and is willing to proceed with procedure as planned.          Subjective:     Chief Complaint/Reason for Admission: acute appendicitis    History of Present Illness: Ross Crystal is a 60 y.o. male with PMH of DM type 1, hyperlipidemia, vertigo presenting to the ED for evaluation of abdominal pain. Patient presents with a two-day history of worsening right lower abdominal pain. Pain is described as sharp, stabbing, currently rated 20/10 in severity. He reports associated nausea secondary to pain. Denies vomiting or loose stool. He reports that his last bowel movement was yesterday but was very small. Denies fever or UTI symptoms.     Rlq pain    No current facility-administered medications on file prior to encounter.     Current Outpatient Medications on File Prior to Encounter   Medication Sig    aspirin (ECOTRIN) 81 MG EC tablet Take 81 mg by mouth once daily.    atorvastatin (LIPITOR) 80 MG tablet Take 80 mg by mouth.    cyclobenzaprine (FLEXERIL) 10 MG tablet Take 10 mg by mouth.    empagliflozin (JARDIANCE) 25 mg tablet Take 1 tablet (25 mg total) by mouth once daily.    finasteride (PROSCAR) 5 mg tablet Take 1 tablet (5 mg total) by mouth once daily.    fish oil-omega-3 fatty acids 300-1,000 mg capsule Take 1 capsule by mouth 2 (two) times a day.    HYDROcodone-acetaminophen (NORCO) 5-325 mg per tablet Take 1 tablet by mouth every 4 (four) hours as needed for Pain.    insulin aspart U-100 (NOVOLOG FLEXPEN U-100 INSULIN) 100 unit/mL (3 mL) InPn pen Inject 7 Units  "into the skin 3 (three) times daily with meals.    insulin glargine U-100, Lantus, 100 unit/mL (3 mL) SubQ InPn pen Inject 14 Units into the skin once daily.    tamsulosin (FLOMAX) 0.4 mg Cap Take 1 capsule (0.4 mg total) by mouth 2 (two) times a day.    BAQSIMI 3 mg/actuation [place] 3 mg by Nasal route.    BD ULTRA-FINE JENNY PEN NEEDLE 32 gauge x 5/32" Ndle 4 (four) times daily.    BD ULTRA-FINE JENNY PEN NEEDLE 32 gauge x 5/32" Ndle To inject insulin 4 times daily    blood sugar diagnostic (TRUE METRIX GLUCOSE TEST STRIP) Strp Check BG three times daily    blood-glucose sensor (FREESTYLE JULIO 3 PLUS SENSOR) Sadaf To use for glucose monitoring every 15 days.    flash glucose scanning reader (FREESTYLE JULIO 2 READER) Misc Use as instructed for blood glucose monitoring.    flash glucose sensor (FREESTYLE JULIO 14 DAY SENSOR) Kit Inject 1 each into the skin.    FREESTYLE JULIO 2 SENSOR Kit CHANGE EVERY 14 DAYS FOR BLOOD GLUCOSE MONITORING    FREESTYLE JULIO 3 READER Misc 1 Device by Misc.(Non-Drug; Combo Route) route once. for 1 dose    insulin syringe-needle U-100 1 mL 31 gauge x 5/16 Syrg     meclizine (ANTIVERT) 50 MG tablet Take 50 mg by mouth 2 (two) times daily.    meloxicam (MOBIC) 15 MG tablet Take 15 mg by mouth.    pravastatin (PRAVACHOL) 40 MG tablet Take 40 mg by mouth every evening.    TRUE METRIX GLUCOSE TEST STRIP Strp 1 strip 4 (four) times daily.    TRUEPLUS LANCETS 33 gauge Misc USE TO TEST BLOOD SUGAR TWO TIMES DAILY       Review of patient's allergies indicates:   Allergen Reactions    Sulfamethoxazole      Other Reaction(s): hives rash    Trimethoprim      Other Reaction(s): hives rash    Bactrim [sulfamethoxazole-trimethoprim] Rash       Past Medical History:   Diagnosis Date    Benign paroxysmal vertigo, bilateral     Diabetes mellitus     Diabetes mellitus type I     Hyperlipemia     Renal disorder     Urinary tract infection      Past Surgical History:   Procedure Laterality Date    CARPAL " TUNNEL RELEASE  25 YEARS AGO    COLONOSCOPY      COLONOSCOPY N/A 1/25/2016    Procedure: COLONOSCOPY;  Surgeon: Luis Bogran-Reyes, MD;  Location: [facility];  Service: Endoscopy;  Laterality: N/A;    DEBRIDEMENT, PHALANX, HAND Left 12/29/2022    Procedure: DEBRIDEMENT, PHALANX, HAND;  Surgeon: Abdon Reid Jr., MD;  Location: [facility];  Service: Orthopedics;  Laterality: Left;    DEBRIDEMENT, PHALANX, HAND Left 2/20/2024    Procedure: DEBRIDEMENT, PHALANX, HAND;  Surgeon: Abdon Reid Jr., MD;  Location: [facility] OR;  Service: Orthopedics;  Laterality: Left;    ESOPHAGOGASTRODUODENOSCOPY      HERNIA REPAIR      HERNIA REPAIR       Family History       Problem Relation (Age of Onset)    Alzheimer's disease Mother    Cancer Father          Tobacco Use    Smoking status: Every Day     Current packs/day: 2.50     Average packs/day: 2.5 packs/day for 36.5 years (91.2 ttl pk-yrs)     Types: Cigarettes     Start date: 1989    Smokeless tobacco: Never    Tobacco comments:     Tobacco cessation   Substance and Sexual Activity    Alcohol use: No     Alcohol/week: 0.0 standard drinks of alcohol    Drug use: No     Comment: 30YEARS AGO, PATIENT ON PRESCRIBED PAIN MEDICATION.     Sexual activity: Not Currently     Review of Systems   Constitutional:  Positive for activity change and appetite change.   HENT: Negative.     Respiratory: Negative.     Gastrointestinal:  Positive for abdominal distention, abdominal pain, nausea and vomiting.   Genitourinary: Negative.    Musculoskeletal: Negative.    Psychiatric/Behavioral: Negative.     All other systems reviewed and are negative.    Objective:     Vital Signs (Most Recent):  Temp: 97.4 °F (36.3 °C) (06/18/25 1105)  Pulse: 99 (06/18/25 1105)  Resp: 16 (06/18/25 1105)  BP: 138/82 (06/18/25 1105)  SpO2: (!) 94 % (06/18/25 1105) Vital Signs (24h Range):  Temp:  [97.4 °F (36.3 °C)-100.2 °F (37.9 °C)] 97.4 °F (36.3 °C)  Pulse:  [] 99  Resp:  [16-20] 16  SpO2:  [91 %-97 %] 94  %  BP: (119-144)/(67-82) 138/82     Weight: 71.3 kg (157 lb 3 oz)  Body mass index is 27.84 kg/m².      Intake/Output Summary (Last 24 hours) at 6/18/2025 1130  Last data filed at 6/18/2025 0433  Gross per 24 hour   Intake 1094.55 ml   Output 1100 ml   Net -5.45 ml       Physical Exam  Vitals and nursing note reviewed. Exam conducted with a chaperone present.   Constitutional:       Appearance: Normal appearance.   Cardiovascular:      Rate and Rhythm: Normal rate and regular rhythm.   Pulmonary:      Effort: Pulmonary effort is normal.      Breath sounds: Normal breath sounds.   Abdominal:      Tenderness: There is abdominal tenderness. There is guarding and rebound.   Neurological:      Mental Status: He is alert.         Significant Labs:  Recent Lab Results  (Last 5 results in the past 24 hours)        06/18/25  1052   06/18/25  0605   06/18/25  0420   06/18/25  0026   06/17/25  1804        Albumin     3.6           ALP     80           ALT     20           Anion Gap     11           AST     15           Baso #     0.07           Basophil %     0.3           BILIRUBIN TOTAL     1.3  Comment: For infants and newborns, interpretation of results should be based   on gestational age, weight and in agreement with clinical   observations.    Premature Infant recommended reference ranges:   0-24 hours:  <8.0 mg/dL   24-48 hours: <12.0 mg/dL   3-5 days:    <15.0 mg/dL   6-29 days:   <15.0 mg/dL           BUN     17           Calcium     9.0           Chloride     103           CO2     19           Creatinine     0.8           eGFR     >60  Comment: Estimated GFR calculated using the CKD-EPI creatinine (2021) equation.           Eos #     0.06           Eos %     0.3           Estimated Avg Glucose         163       Glucose     92           Gran # (ANC)     17.19           Hematocrit     47.3           Hemoglobin     16.0           Hemoglobin A1C External         7.3  Comment: ADA Screening Guidelines:  5.7-6.4%   Consistent with prediabetes  >=6.5%  Consistent with diabetes    High levels of fetal hemoglobin interfere with the HbA1C  assay. Heterozygous hemoglobin variants (HbS, HgC, etc)do  not significantly interfere with this assay.   However, presence of multiple variants may affect accuracy.       Immature Grans (Abs)     0.14  Comment: Mild elevation in immature granulocytes is non specific and can be seen in a variety of conditions including stress response, acute inflammation, trauma and pregnancy. Correlation with other laboratory and clinical findings is essential.           Immature Granulocytes     0.6           Lymph #     2.73           Lymph %     12.4           MCH     28.8           MCHC     33.8           MCV     85           Mono #     1.81           Mono %     8.2           MPV     8.9           Neut %     78.2           nRBC     0           Platelet Count     216           POCT Glucose 93   91     117         Potassium     3.9           PROTEIN TOTAL     7.3           RBC     5.56           RDW     13.9           Sodium     133           WBC     22.00                                  Significant Diagnostics:  reviewd    Assessment/Plan:     Active Diagnoses:    Diagnosis Date Noted POA    Acute appendicitis with localized peritonitis, without perforation, abscess, or gangrene [K35.30] 06/17/2025 Yes    BPH (benign prostatic hyperplasia) [N40.0] 02/12/2025 Yes    Essential hypertension [I10] 01/06/2022 Yes    Pure hypercholesterolemia [E78.00] 01/06/2022 Yes    Type 2 diabetes mellitus without complication [E11.9] 01/06/2022 Yes      Problems Resolved During this Admission:       Thank you for your consult. See above    Jamshid Bailey MD  General Surgery  [place] [facility] (3rd Fl)

## 2025-06-18 NOTE — CONSULTS
[facility] Surg (3rd Fl)  General Surgery  Consult Note    Inpatient consult to General Surgery  Consult performed by: Jamshid Bailey MD  Consult ordered by: Inessa Huddleston NP  Reason for consult: acute appendicitis  Assessment/Recommendations: To or for lap appendectomy.  Risks and benefits of procedure discussed with patient and family present including but not limited to major complications.  After all questions answered, patient agrees to and is willing to proceed with procedure as planned.          Subjective:     Chief Complaint/Reason for Admission: acute appendicitis    History of Present Illness: Ross Crystal is a 60 y.o. male with PMH of DM type 1, hyperlipidemia, vertigo presenting to the ED for evaluation of abdominal pain. Patient presents with a two-day history of worsening right lower abdominal pain. Pain is described as sharp, stabbing, currently rated 20/10 in severity. He reports associated nausea secondary to pain. Denies vomiting or loose stool. He reports that his last bowel movement was yesterday but was very small. Denies fever or UTI symptoms.     Rlq pain    No current facility-administered medications on file prior to encounter.     Current Outpatient Medications on File Prior to Encounter   Medication Sig    aspirin (ECOTRIN) 81 MG EC tablet Take 81 mg by mouth once daily.    atorvastatin (LIPITOR) 80 MG tablet Take 80 mg by mouth.    cyclobenzaprine (FLEXERIL) 10 MG tablet Take 10 mg by mouth.    empagliflozin (JARDIANCE) 25 mg tablet Take 1 tablet (25 mg total) by mouth once daily.    finasteride (PROSCAR) 5 mg tablet Take 1 tablet (5 mg total) by mouth once daily.    fish oil-omega-3 fatty acids 300-1,000 mg capsule Take 1 capsule by mouth 2 (two) times a day.    HYDROcodone-acetaminophen (NORCO) 5-325 mg per tablet Take 1 tablet by mouth every 4 (four) hours as needed for Pain.    insulin aspart U-100 (NOVOLOG FLEXPEN U-100 INSULIN) 100 unit/mL (3 mL) InPn pen Inject 7 Units  "into the skin 3 (three) times daily with meals.    insulin glargine U-100, Lantus, 100 unit/mL (3 mL) SubQ InPn pen Inject 14 Units into the skin once daily.    tamsulosin (FLOMAX) 0.4 mg Cap Take 1 capsule (0.4 mg total) by mouth 2 (two) times a day.    BAQSIMI 3 mg/actuation [place] 3 mg by Nasal route.    BD ULTRA-FINE JENNY PEN NEEDLE 32 gauge x 5/32" Ndle 4 (four) times daily.    BD ULTRA-FINE JENNY PEN NEEDLE 32 gauge x 5/32" Ndle To inject insulin 4 times daily    blood sugar diagnostic (TRUE METRIX GLUCOSE TEST STRIP) Strp Check BG three times daily    blood-glucose sensor (FREESTYLE JULIO 3 PLUS SENSOR) Sadaf To use for glucose monitoring every 15 days.    flash glucose scanning reader (FREESTYLE JULIO 2 READER) Misc Use as instructed for blood glucose monitoring.    flash glucose sensor (FREESTYLE JULIO 14 DAY SENSOR) Kit Inject 1 each into the skin.    FREESTYLE JULIO 2 SENSOR Kit CHANGE EVERY 14 DAYS FOR BLOOD GLUCOSE MONITORING    FREESTYLE JULIO 3 READER Misc 1 Device by Misc.(Non-Drug; Combo Route) route once. for 1 dose    insulin syringe-needle U-100 1 mL 31 gauge x 5/16 Syrg     meclizine (ANTIVERT) 50 MG tablet Take 50 mg by mouth 2 (two) times daily.    meloxicam (MOBIC) 15 MG tablet Take 15 mg by mouth.    pravastatin (PRAVACHOL) 40 MG tablet Take 40 mg by mouth every evening.    TRUE METRIX GLUCOSE TEST STRIP Strp 1 strip 4 (four) times daily.    TRUEPLUS LANCETS 33 gauge Misc USE TO TEST BLOOD SUGAR TWO TIMES DAILY       Review of patient's allergies indicates:   Allergen Reactions    Sulfamethoxazole      Other Reaction(s): hives rash    Trimethoprim      Other Reaction(s): hives rash    Bactrim [sulfamethoxazole-trimethoprim] Rash       Past Medical History:   Diagnosis Date    Benign paroxysmal vertigo, bilateral     Diabetes mellitus     Diabetes mellitus type I     Hyperlipemia     Renal disorder     Urinary tract infection      Past Surgical History:   Procedure Laterality Date    CARPAL " TUNNEL RELEASE  25 YEARS AGO    COLONOSCOPY      COLONOSCOPY N/A 1/25/2016    Procedure: COLONOSCOPY;  Surgeon: Luis Bogran-Reyes, MD;  Location: [facility];  Service: Endoscopy;  Laterality: N/A;    DEBRIDEMENT, PHALANX, HAND Left 12/29/2022    Procedure: DEBRIDEMENT, PHALANX, HAND;  Surgeon: Abdon Reid Jr., MD;  Location: [facility];  Service: Orthopedics;  Laterality: Left;    DEBRIDEMENT, PHALANX, HAND Left 2/20/2024    Procedure: DEBRIDEMENT, PHALANX, HAND;  Surgeon: Abdon Reid Jr., MD;  Location: [facility] OR;  Service: Orthopedics;  Laterality: Left;    ESOPHAGOGASTRODUODENOSCOPY      HERNIA REPAIR      HERNIA REPAIR       Family History       Problem Relation (Age of Onset)    Alzheimer's disease Mother    Cancer Father          Tobacco Use    Smoking status: Every Day     Current packs/day: 2.50     Average packs/day: 2.5 packs/day for 36.5 years (91.2 ttl pk-yrs)     Types: Cigarettes     Start date: 1989    Smokeless tobacco: Never    Tobacco comments:     Tobacco cessation   Substance and Sexual Activity    Alcohol use: No     Alcohol/week: 0.0 standard drinks of alcohol    Drug use: No     Comment: 30YEARS AGO, PATIENT ON PRESCRIBED PAIN MEDICATION.     Sexual activity: Not Currently     Review of Systems   Constitutional:  Positive for activity change and appetite change.   HENT: Negative.     Respiratory: Negative.     Gastrointestinal:  Positive for abdominal distention, abdominal pain, nausea and vomiting.   Genitourinary: Negative.    Musculoskeletal: Negative.    Psychiatric/Behavioral: Negative.     All other systems reviewed and are negative.    Objective:     Vital Signs (Most Recent):  Temp: 97.4 °F (36.3 °C) (06/18/25 1105)  Pulse: 99 (06/18/25 1105)  Resp: 16 (06/18/25 1105)  BP: 138/82 (06/18/25 1105)  SpO2: (!) 94 % (06/18/25 1105) Vital Signs (24h Range):  Temp:  [97.4 °F (36.3 °C)-100.2 °F (37.9 °C)] 97.4 °F (36.3 °C)  Pulse:  [] 99  Resp:  [16-20] 16  SpO2:  [91 %-97 %] 94  %  BP: (119-144)/(67-82) 138/82     Weight: 71.3 kg (157 lb 3 oz)  Body mass index is 27.84 kg/m².      Intake/Output Summary (Last 24 hours) at 6/18/2025 1130  Last data filed at 6/18/2025 0433  Gross per 24 hour   Intake 1094.55 ml   Output 1100 ml   Net -5.45 ml       Physical Exam  Vitals and nursing note reviewed. Exam conducted with a chaperone present.   Constitutional:       Appearance: Normal appearance.   Cardiovascular:      Rate and Rhythm: Normal rate and regular rhythm.   Pulmonary:      Effort: Pulmonary effort is normal.      Breath sounds: Normal breath sounds.   Abdominal:      Tenderness: There is abdominal tenderness. There is guarding and rebound.   Neurological:      Mental Status: He is alert.         Significant Labs:  Recent Lab Results  (Last 5 results in the past 24 hours)        06/18/25  1052   06/18/25  0605   06/18/25  0420   06/18/25  0026   06/17/25  1804        Albumin     3.6           ALP     80           ALT     20           Anion Gap     11           AST     15           Baso #     0.07           Basophil %     0.3           BILIRUBIN TOTAL     1.3  Comment: For infants and newborns, interpretation of results should be based   on gestational age, weight and in agreement with clinical   observations.    Premature Infant recommended reference ranges:   0-24 hours:  <8.0 mg/dL   24-48 hours: <12.0 mg/dL   3-5 days:    <15.0 mg/dL   6-29 days:   <15.0 mg/dL           BUN     17           Calcium     9.0           Chloride     103           CO2     19           Creatinine     0.8           eGFR     >60  Comment: Estimated GFR calculated using the CKD-EPI creatinine (2021) equation.           Eos #     0.06           Eos %     0.3           Estimated Avg Glucose         163       Glucose     92           Gran # (ANC)     17.19           Hematocrit     47.3           Hemoglobin     16.0           Hemoglobin A1C External         7.3  Comment: ADA Screening Guidelines:  5.7-6.4%   Consistent with prediabetes  >=6.5%  Consistent with diabetes    High levels of fetal hemoglobin interfere with the HbA1C  assay. Heterozygous hemoglobin variants (HbS, HgC, etc)do  not significantly interfere with this assay.   However, presence of multiple variants may affect accuracy.       Immature Grans (Abs)     0.14  Comment: Mild elevation in immature granulocytes is non specific and can be seen in a variety of conditions including stress response, acute inflammation, trauma and pregnancy. Correlation with other laboratory and clinical findings is essential.           Immature Granulocytes     0.6           Lymph #     2.73           Lymph %     12.4           MCH     28.8           MCHC     33.8           MCV     85           Mono #     1.81           Mono %     8.2           MPV     8.9           Neut %     78.2           nRBC     0           Platelet Count     216           POCT Glucose 93   91     117         Potassium     3.9           PROTEIN TOTAL     7.3           RBC     5.56           RDW     13.9           Sodium     133           WBC     22.00                                  Significant Diagnostics:  reviewd    Assessment/Plan:     Active Diagnoses:    Diagnosis Date Noted POA    Acute appendicitis with localized peritonitis, without perforation, abscess, or gangrene [K35.30] 06/17/2025 Yes    BPH (benign prostatic hyperplasia) [N40.0] 02/12/2025 Yes    Essential hypertension [I10] 01/06/2022 Yes    Pure hypercholesterolemia [E78.00] 01/06/2022 Yes    Type 2 diabetes mellitus without complication [E11.9] 01/06/2022 Yes      Problems Resolved During this Admission:       Thank you for your consult. See above    Jamshid Bailey MD  General Surgery  [place] [facility] (3rd Fl)

## 2025-06-18 NOTE — PLAN OF CARE
[place] [facility] (3rd Fl)  Discharge Assessment    Primary Care Provider: Ross Valverde MD     Discharge Assessment (most recent)       BRIEF DISCHARGE ASSESSMENT - 06/18/25 153          Discharge Planning    Assessment Type Discharge Planning Brief Assessment (P)      Resource/Environmental Concerns none (P)      Support Systems Children;Family members (P)      Assistance Needed none (P)      Equipment Currently Used at Home none (P)      Current Living Arrangements home (P)      Patient/Family Anticipates Transition to home (P)      Patient/Family Anticipated Services at Transition none (P)      DME Needed Upon Discharge  none (P)      Discharge Plan A Home with family (P)      Discharge Plan B Home Health (P)                        Discharge assessment completed at bedside with patient. No post acute needs determined at this time. CM to remain available if needs arise.

## 2025-06-18 NOTE — HOSPITAL COURSE
Admitted for appendicitis.  IV fluids, IV zosyn and IV pain control.  General surgery consulted for appendectomy.      6/19 S/p appendectomy.  Unable to discharge home due to up trending leukocytosis.  Continue IV zosyn and repeat CBC tomorrow.      6/20 Pt HD stable on room air.  Patient has had a persistent leukocytosis and developed anion gap overnight.  Metabolic acidosis seen on abg.  4+ ketones and elevated betahydroxybutyrate. Bicarb ordered and insulin gtt started for DKA.

## 2025-06-18 NOTE — PLAN OF CARE
Problem: Adult Inpatient Plan of Care  Goal: Plan of Care Review  Outcome: Progressing  Flowsheets (Taken 6/18/2025 3472)  Plan of Care Reviewed With: patient  Goal: Patient-Specific Goal (Individualized)  Outcome: Progressing  Goal: Absence of Hospital-Acquired Illness or Injury  Outcome: Progressing  Goal: Optimal Comfort and Wellbeing  Outcome: Progressing  Goal: Readiness for Transition of Care  Outcome: Progressing     Problem: Diabetes Comorbidity  Goal: Blood Glucose Level Within Targeted Range  Outcome: Progressing     Problem: Fall Injury Risk  Goal: Absence of Fall and Fall-Related Injury  Outcome: Progressing     Problem: Pain Acute  Goal: Optimal Pain Control and Function  Outcome: Progressing     Problem: Infection  Goal: Absence of Infection Signs and Symptoms  Outcome: Progressing

## 2025-06-19 LAB
ABSOLUTE NEUTROPHIL MANUAL (OHS): 21 K/UL
ALBUMIN SERPL BCP-MCNC: 3.6 G/DL (ref 3.5–5.2)
ALP SERPL-CCNC: 98 UNIT/L (ref 40–150)
ALT SERPL W/O P-5'-P-CCNC: 20 UNIT/L (ref 10–44)
ANION GAP (OHS): 16 MMOL/L (ref 8–16)
AST SERPL-CCNC: 15 UNIT/L (ref 11–45)
BILIRUB SERPL-MCNC: 1 MG/DL (ref 0.1–1)
BUN SERPL-MCNC: 24 MG/DL (ref 8–23)
CALCIUM SERPL-MCNC: 9.9 MG/DL (ref 8.7–10.5)
CHLORIDE SERPL-SCNC: 102 MMOL/L (ref 95–110)
CO2 SERPL-SCNC: 15 MMOL/L (ref 23–29)
CREAT SERPL-MCNC: 1.2 MG/DL (ref 0.5–1.4)
ERYTHROCYTE [DISTWIDTH] IN BLOOD BY AUTOMATED COUNT: 14.1 % (ref 11.5–14.5)
GFR SERPLBLD CREATININE-BSD FMLA CKD-EPI: >60 ML/MIN/1.73/M2
GLUCOSE SERPL-MCNC: 227 MG/DL (ref 70–110)
HCT VFR BLD AUTO: 50.5 % (ref 40–54)
HGB BLD-MCNC: 16.5 GM/DL (ref 14–18)
LYMPHOCYTES NFR BLD MANUAL: 12 % (ref 18–48)
MAGNESIUM SERPL-MCNC: 2.3 MG/DL (ref 1.6–2.6)
MCH RBC QN AUTO: 29.1 PG (ref 27–31)
MCHC RBC AUTO-ENTMCNC: 32.7 G/DL (ref 32–36)
MCV RBC AUTO: 89 FL (ref 82–98)
MONOCYTES NFR BLD MANUAL: 6 % (ref 4–15)
NEUTROPHILS NFR BLD MANUAL: 74 % (ref 38–73)
NEUTS BAND NFR BLD MANUAL: 8 %
NUCLEATED RBC (/100WBC) (OHS): 0 /100 WBC
PLATELET # BLD AUTO: 238 K/UL (ref 150–450)
PMV BLD AUTO: 9 FL (ref 9.2–12.9)
POCT GLUCOSE: 225 MG/DL (ref 70–110)
POCT GLUCOSE: 249 MG/DL (ref 70–110)
POCT GLUCOSE: 268 MG/DL (ref 70–110)
POCT GLUCOSE: 295 MG/DL (ref 70–110)
POTASSIUM SERPL-SCNC: 5.2 MMOL/L (ref 3.5–5.1)
PROT SERPL-MCNC: 8.3 GM/DL (ref 6–8.4)
RBC # BLD AUTO: 5.67 M/UL (ref 4.6–6.2)
SODIUM SERPL-SCNC: 133 MMOL/L (ref 136–145)
WBC # BLD AUTO: 25.6 K/UL (ref 3.9–12.7)

## 2025-06-19 PROCEDURE — 63600175 PHARM REV CODE 636 W HCPCS: Performed by: NURSE PRACTITIONER

## 2025-06-19 PROCEDURE — 25000003 PHARM REV CODE 250: Performed by: INTERNAL MEDICINE

## 2025-06-19 PROCEDURE — 36415 COLL VENOUS BLD VENIPUNCTURE: CPT | Performed by: PHYSICIAN ASSISTANT

## 2025-06-19 PROCEDURE — 63600175 PHARM REV CODE 636 W HCPCS: Performed by: INTERNAL MEDICINE

## 2025-06-19 PROCEDURE — 83735 ASSAY OF MAGNESIUM: CPT | Performed by: PHYSICIAN ASSISTANT

## 2025-06-19 PROCEDURE — 99900035 HC TECH TIME PER 15 MIN (STAT)

## 2025-06-19 PROCEDURE — 11000001 HC ACUTE MED/SURG PRIVATE ROOM

## 2025-06-19 PROCEDURE — 94761 N-INVAS EAR/PLS OXIMETRY MLT: CPT

## 2025-06-19 PROCEDURE — 85027 COMPLETE CBC AUTOMATED: CPT | Performed by: PHYSICIAN ASSISTANT

## 2025-06-19 PROCEDURE — 27000221 HC OXYGEN, UP TO 24 HOURS

## 2025-06-19 PROCEDURE — 80053 COMPREHEN METABOLIC PANEL: CPT | Performed by: PHYSICIAN ASSISTANT

## 2025-06-19 PROCEDURE — 25000003 PHARM REV CODE 250: Performed by: NURSE PRACTITIONER

## 2025-06-19 RX ORDER — FINASTERIDE 5 MG/1
5 TABLET, FILM COATED ORAL DAILY
Status: DISCONTINUED | OUTPATIENT
Start: 2025-06-20 | End: 2025-06-20 | Stop reason: HOSPADM

## 2025-06-19 RX ORDER — ATORVASTATIN CALCIUM 40 MG/1
80 TABLET, FILM COATED ORAL DAILY
Status: DISCONTINUED | OUTPATIENT
Start: 2025-06-20 | End: 2025-06-20 | Stop reason: HOSPADM

## 2025-06-19 RX ORDER — FAMOTIDINE 20 MG/1
20 TABLET, FILM COATED ORAL 2 TIMES DAILY
Status: DISCONTINUED | OUTPATIENT
Start: 2025-06-19 | End: 2025-06-20

## 2025-06-19 RX ORDER — TAMSULOSIN HYDROCHLORIDE 0.4 MG/1
0.4 CAPSULE ORAL DAILY
Status: DISCONTINUED | OUTPATIENT
Start: 2025-06-20 | End: 2025-06-20 | Stop reason: HOSPADM

## 2025-06-19 RX ORDER — ASPIRIN 81 MG/1
81 TABLET ORAL DAILY
Status: DISCONTINUED | OUTPATIENT
Start: 2025-06-20 | End: 2025-06-20 | Stop reason: HOSPADM

## 2025-06-19 RX ADMIN — PIPERACILLIN SODIUM AND TAZOBACTAM SODIUM 4.5 G: 4; .5 INJECTION, POWDER, LYOPHILIZED, FOR SOLUTION INTRAVENOUS at 06:06

## 2025-06-19 RX ADMIN — PIPERACILLIN SODIUM AND TAZOBACTAM SODIUM 4.5 G: 4; .5 INJECTION, POWDER, LYOPHILIZED, FOR SOLUTION INTRAVENOUS at 03:06

## 2025-06-19 RX ADMIN — INSULIN ASPART 2 UNITS: 100 INJECTION, SOLUTION INTRAVENOUS; SUBCUTANEOUS at 07:06

## 2025-06-19 RX ADMIN — MORPHINE SULFATE 4 MG: 4 INJECTION INTRAVENOUS at 04:06

## 2025-06-19 RX ADMIN — INSULIN ASPART 3 UNITS: 100 INJECTION, SOLUTION INTRAVENOUS; SUBCUTANEOUS at 04:06

## 2025-06-19 RX ADMIN — INSULIN ASPART 1 UNITS: 100 INJECTION, SOLUTION INTRAVENOUS; SUBCUTANEOUS at 08:06

## 2025-06-19 RX ADMIN — INSULIN ASPART 3 UNITS: 100 INJECTION, SOLUTION INTRAVENOUS; SUBCUTANEOUS at 11:06

## 2025-06-19 RX ADMIN — FAMOTIDINE 20 MG: 20 TABLET, FILM COATED ORAL at 09:06

## 2025-06-19 RX ADMIN — MORPHINE SULFATE 2 MG: 2 INJECTION, SOLUTION INTRAMUSCULAR; INTRAVENOUS at 11:06

## 2025-06-19 RX ADMIN — PIPERACILLIN SODIUM AND TAZOBACTAM SODIUM 4.5 G: 4; .5 INJECTION, POWDER, LYOPHILIZED, FOR SOLUTION INTRAVENOUS at 10:06

## 2025-06-19 NOTE — PLAN OF CARE
06/19/25 0842   Rounds   Attendance Provider;Nurse ;   Discharge Plan A Home   Why the patient remains in the hospital Requires continued medical care   Transition of Care Barriers None     Care team reviewed plan of care and discharge plan with patient. CM will continue to follow till discharge.

## 2025-06-19 NOTE — PROGRESS NOTES
[facility] Surg ([facility])  [facility] Medicine  Progress Note    Patient Name: Ross Crystal  MRN: [id number]  Patient Class: IP- Inpatient   Admission Date: 6/17/2025  Length of Stay: 1 days  Attending Physician: Bhupinder Erwin MD  Primary Care Provider: Ross Valverde MD        Subjective     Principal Problem:Acute appendicitis with localized peritonitis, without perforation, abscess, or gangrene        HPI:  Ross Crystal is a 60 y.o. male  with a past medical history of T2DM, HTN, HLD, Neuropathy here for for acute appendicitis.  He reports right lower quadrant pain for 2 days.  The pain has been getting worse, 8/10 in intensity, worse with movement and coughing.  He denies vomiting.  Some nausea is reported.  Workup in the emergency room showed leukocytosis with white count more than 21,000.  CT scan showed The appendix is dilated and fluid-filled, and there is surrounding inflammatory fat stranding.  Findings are consistent with acute appendicitis.  There is no abscess or perforation.  The appendix is noted to be retrocecal in location, located in the right mid abdomen.  No inflammation is seen elsewhere in the bowel.  There are few distal colonic diverticula.  There is no free intraperitoneal fluid or air.   Acute appendicitis without abscess or perforation.    I saw him on 3rd floor where he has been admitted to hospital service after surgical consultation.  He has been placed on OR list for tomorrow morning.  He will be NPO.  Pain control.  IV Zosyn.    Overview/Hospital Course:  Admitted for appendicitis.  IV fluids, IV zosyn and IV pain control.  General surgery consulted for appendectomy.      6/19 S/p appendectomy.  Unable to discharge home due to up trending leukocytosis.  Continue IV zosyn and repeat CBC tomorrow.      Past Medical History:   Diagnosis Date    Benign paroxysmal vertigo, bilateral     Diabetes mellitus     Diabetes mellitus type I     Hyperlipemia     Renal disorder     Urinary  tract infection        Past Surgical History:   Procedure Laterality Date    CARPAL TUNNEL RELEASE  25 YEARS AGO    COLONOSCOPY      COLONOSCOPY N/A 1/25/2016    Procedure: COLONOSCOPY;  Surgeon: Luis Bogran-Reyes, MD;  Location: [facility];  Service: Endoscopy;  Laterality: N/A;    DEBRIDEMENT, PHALANX, HAND Left 12/29/2022    Procedure: DEBRIDEMENT, PHALANX, HAND;  Surgeon: Abdon Reid Jr., MD;  Location: [facility] OR;  Service: Orthopedics;  Laterality: Left;    DEBRIDEMENT, PHALANX, HAND Left 2/20/2024    Procedure: DEBRIDEMENT, PHALANX, HAND;  Surgeon: Abdon Reid Jr., MD;  Location: [facility] OR;  Service: Orthopedics;  Laterality: Left;    ESOPHAGOGASTRODUODENOSCOPY      HERNIA REPAIR      HERNIA REPAIR      LAPAROSCOPIC APPENDECTOMY N/A 6/18/2025    Procedure: APPENDECTOMY, LAPAROSCOPIC;  Surgeon: Jamshid Bailey MD;  Location: [facility];  Service: General;  Laterality: N/A;       Review of patient's allergies indicates:   Allergen Reactions    Sulfamethoxazole      Other Reaction(s): hives rash    Trimethoprim      Other Reaction(s): hives rash    Bactrim [sulfamethoxazole-trimethoprim] Rash       No current facility-administered medications on file prior to encounter.     Current Outpatient Medications on File Prior to Encounter   Medication Sig    aspirin (ECOTRIN) 81 MG EC tablet Take 81 mg by mouth once daily.    atorvastatin (LIPITOR) 80 MG tablet Take 80 mg by mouth.    cyclobenzaprine (FLEXERIL) 10 MG tablet Take 10 mg by mouth.    empagliflozin (JARDIANCE) 25 mg tablet Take 1 tablet (25 mg total) by mouth once daily.    finasteride (PROSCAR) 5 mg tablet Take 1 tablet (5 mg total) by mouth once daily.    fish oil-omega-3 fatty acids 300-1,000 mg capsule Take 1 capsule by mouth 2 (two) times a day.    HYDROcodone-acetaminophen (NORCO) 5-325 mg per tablet Take 1 tablet by mouth every 4 (four) hours as needed for Pain.    insulin aspart U-100 (NOVOLOG FLEXPEN U-100 INSULIN) 100 unit/mL (3 mL) InPn pen  "Inject 7 Units into the skin 3 (three) times daily with meals.    insulin glargine U-100, Lantus, 100 unit/mL (3 mL) SubQ InPn pen Inject 14 Units into the skin once daily.    tamsulosin (FLOMAX) 0.4 mg Cap Take 1 capsule (0.4 mg total) by mouth 2 (two) times a day.    BAQSIMI 3 mg/actuation [place] 3 mg by Nasal route.    BD ULTRA-FINE JENNY PEN NEEDLE 32 gauge x 5/32" Ndle 4 (four) times daily.    BD ULTRA-FINE JENNY PEN NEEDLE 32 gauge x 5/32" Ndle To inject insulin 4 times daily    blood sugar diagnostic (TRUE METRIX GLUCOSE TEST STRIP) Strp Check BG three times daily    blood-glucose sensor (FREESTYLE JULIO 3 PLUS SENSOR) Sadaf To use for glucose monitoring every 15 days.    flash glucose scanning reader (FREESTYLE JULIO 2 READER) Misc Use as instructed for blood glucose monitoring.    flash glucose sensor (FREESTYLE JULIO 14 DAY SENSOR) Kit Inject 1 each into the skin.    FREESTYLE JULIO 2 SENSOR Kit CHANGE EVERY 14 DAYS FOR BLOOD GLUCOSE MONITORING    FREESTYLE JULIO 3 READER Misc 1 Device by Misc.(Non-Drug; Combo Route) route once. for 1 dose    insulin syringe-needle U-100 1 mL 31 gauge x 5/16 Syrg     meclizine (ANTIVERT) 50 MG tablet Take 50 mg by mouth 2 (two) times daily.    meloxicam (MOBIC) 15 MG tablet Take 15 mg by mouth.    pravastatin (PRAVACHOL) 40 MG tablet Take 40 mg by mouth every evening.    TRUE METRIX GLUCOSE TEST STRIP Strp 1 strip 4 (four) times daily.    TRUEPLUS LANCETS 33 gauge Misc USE TO TEST BLOOD SUGAR TWO TIMES DAILY     Family History       Problem Relation (Age of Onset)    Alzheimer's disease Mother    Cancer Father          Tobacco Use    Smoking status: Every Day     Current packs/day: 2.50     Average packs/day: 2.5 packs/day for 36.5 years (91.2 ttl pk-yrs)     Types: Cigarettes     Start date: 1989    Smokeless tobacco: Never    Tobacco comments:     Tobacco cessation   Substance and Sexual Activity    Alcohol use: No     Alcohol/week: 0.0 standard drinks of alcohol    Drug use: " No     Comment: 30YEARS AGO, PATIENT ON PRESCRIBED PAIN MEDICATION.     Sexual activity: Not Currently     Review of Systems   Constitutional:  Negative for chills and fever.   HENT:  Negative for congestion, postnasal drip and sore throat.    Eyes:  Negative for photophobia.   Respiratory:  Negative for chest tightness and shortness of breath.    Cardiovascular:  Negative for chest pain.   Gastrointestinal:  Negative for abdominal distention, abdominal pain, blood in stool, nausea and vomiting.   Genitourinary:  Negative for dysuria, flank pain and hematuria.   Musculoskeletal:  Negative for back pain.   Skin:  Negative for pallor.   Neurological:  Negative for dizziness, seizures, facial asymmetry, speech difficulty and numbness.   Hematological:  Does not bruise/bleed easily.   Psychiatric/Behavioral:  Negative for agitation and suicidal ideas. The patient is not nervous/anxious.      Objective:     Vital Signs (Most Recent):  Temp: 97.5 °F (36.4 °C) (06/19/25 1515)  Pulse: 99 (06/19/25 1515)  Resp: 20 (06/19/25 1515)  BP: 134/76 (06/19/25 1515)  SpO2: 96 % (06/19/25 1515) Vital Signs (24h Range):  Temp:  [97.5 °F (36.4 °C)-98.2 °F (36.8 °C)] 97.5 °F (36.4 °C)  Pulse:  [] 99  Resp:  [16-22] 20  SpO2:  [93 %-97 %] 96 %  BP: (127-134)/(63-78) 134/76     Weight: 71.3 kg (157 lb 3 oz)  Body mass index is 27.84 kg/m².     Physical Exam  Vitals and nursing note reviewed.   Constitutional:       Appearance: He is well-developed.   HENT:      Head: Normocephalic and atraumatic.      Nose: Nose normal.   Eyes:      Conjunctiva/sclera: Conjunctivae normal.      Pupils: Pupils are equal, round, and reactive to light.   Neck:      Thyroid: No thyromegaly.      Vascular: No JVD.   Cardiovascular:      Rate and Rhythm: Normal rate and regular rhythm.      Heart sounds: Normal heart sounds.   Pulmonary:      Effort: Pulmonary effort is normal.      Breath sounds: Normal breath sounds.   Abdominal:      General: Bowel  "sounds are normal. There is no distension.      Palpations: Abdomen is soft. There is no mass.      Tenderness: There is no abdominal tenderness. There is no guarding.       Musculoskeletal:         General: Normal range of motion.      Cervical back: Normal range of motion and neck supple.   Lymphadenopathy:      Cervical: No cervical adenopathy.   Skin:     General: Skin is warm and dry.      Coloration: Skin is not pale.      Findings: No rash.   Neurological:      Mental Status: He is alert and oriented to person, place, and time.      Cranial Nerves: No cranial nerve deficit.      Deep Tendon Reflexes: Reflexes are normal and symmetric.              CRANIAL NERVES     CN III, IV, VI   Pupils are equal, round, and reactive to light.       Significant Labs: All pertinent labs within the past 24 hours have been reviewed.  A1C:   Recent Labs   Lab 02/11/25  1830 06/17/25  1804   HGBA1C 7.5* 7.3*     CBC:   Recent Labs   Lab 06/18/25  0420 06/19/25  0439   WBC 22.00* 25.60*   HGB 16.0 16.5   HCT 47.3 50.5    238     CMP:   Recent Labs   Lab 06/18/25  0420 06/19/25  0439   * 133*   K 3.9 5.2*    102   CO2 19* 15*   GLU 92 227*   BUN 17 24*   CREATININE 0.8 1.2   CALCIUM 9.0 9.9   PROT 7.3 8.3   ALBUMIN 3.6 3.6   BILITOT 1.3* 1.0   ALKPHOS 80 98   AST 15 15   ALT 20 20   ANIONGAP 11 16     Troponin:   No results for input(s): "TROPONINI", "TROPONINIHS" in the last 48 hours.    Urine Studies:   No results for input(s): "COLORU", "APPEARANCEUA", "PHUR", "SPECGRAV", "PROTEINUA", "GLUCUA", "KETONESU", "BILIRUBINUA", "OCCULTUA", "NITRITE", "UROBILINOGEN", "LEUKOCYTESUR", "RBCUA", "WBCUA", "BACTERIA", "SQUAMEPITHEL", "HYALINECASTS" in the last 48 hours.    Invalid input(s): "WRIGHTSUR"      Significant Imaging: I have reviewed all pertinent imaging results/findings within the past 24 hours.  CT: I have reviewed all pertinent results/findings within the past 24 hours and my personal findings are:  The " appendix is dilated and fluid-filled, and there is surrounding inflammatory fat stranding.  Findings are consistent with acute appendicitis.  There is no abscess or perforation.  The appendix is noted to be retrocecal in location, located in the right mid abdomen.  No inflammation is seen elsewhere in the bowel.  There are few distal colonic diverticula.  There is no free intraperitoneal fluid or air.      Assessment & Plan  Essential hypertension  Patient's blood pressure range in the last 24 hours was: BP  Min: 127/64  Max: 134/76.The patient's inpatient anti-hypertensive regimen is listed below:  Current Antihypertensives     Blood pressure is well controlled he is NPO we will hold meds for now    Resume home medications 6/19  Pure hypercholesterolemia  He is on statin.  Hold for now    Resume home statin 6/19    Type 2 diabetes mellitus without complication  Patient's FSGs are uncontrolled due to hyperglycemia on current medication regimen.  Last A1c reviewed-   Lab Results   Component Value Date    HGBA1C 7.3 (H) 06/17/2025     Most recent fingerstick glucose reviewed-   Recent Labs   Lab 06/18/25  1607 06/18/25 2000 06/19/25  0705 06/19/25  1118   POCTGLUCOSE 176* 266* 225* 295*     Current correctional scale  Low  Maintain anti-hyperglycemic dose as follows-   Antihyperglycemics (From admission, onward)      Start     Stop Route Frequency Ordered    06/18/25 2111  insulin aspart U-100 pen 0-5 Units         -- SubQ Before meals & nightly PRN 06/18/25 2011          Hold Oral hypoglycemics while patient is in the hospital.  BPH (benign prostatic hyperplasia)  Resume home flomax and finasteride    Acute appendicitis with localized peritonitis, without perforation, abscess, or gangrene  NPO   IV fluids   Pain control  IV Zosyn  General surgery consulted     6/19 S/p appendectomy.  Continue leukocytosis.  Will continue IV zosyn and repeat CBC tomorrow     Leukocytosis  White count initially elevated due to acute  appendicitis  Trending up after appendectomy  Repeat CBC.  Would like to make sure improving before discharge.  Continue IV zosyn and general surgery recommending discharge with po abx.    VTE Risk Mitigation (From admission, onward)           Ordered     IP VTE HIGH RISK PATIENT  Once         06/17/25 1635     Place sequential compression device  Until discontinued         06/17/25 1635                    Discharge Planning   NAZ: 6/20/2025     Code Status: Full Code   Patient is Medically Not Yet Ready for discharge.  Medical Readiness for Discharge Date:   Discharge Plan A: Home          SDOH Screening:  The patient declined to be screened for utility difficulties, food insecurity, transport difficulties, housing insecurity, and interpersonal safety, so no concerns could be identified this admission.                      Sherie Lezama PA-C  Department of Hospital Medicine   [facility] Surg (3rd Fl)

## 2025-06-19 NOTE — ASSESSMENT & PLAN NOTE
White count initially elevated due to acute appendicitis  Trending up after appendectomy  Repeat CBC.  Would like to make sure improving before discharge.  Continue IV zosyn and general surgery recommending discharge with po abx.

## 2025-06-19 NOTE — SUBJECTIVE & OBJECTIVE
Past Medical History:   Diagnosis Date    Benign paroxysmal vertigo, bilateral     Diabetes mellitus     Diabetes mellitus type I     Hyperlipemia     Renal disorder     Urinary tract infection        Past Surgical History:   Procedure Laterality Date    CARPAL TUNNEL RELEASE  25 YEARS AGO    COLONOSCOPY      COLONOSCOPY N/A 1/25/2016    Procedure: COLONOSCOPY;  Surgeon: Luis Bogran-Reyes, MD;  Location: [facility];  Service: Endoscopy;  Laterality: N/A;    DEBRIDEMENT, PHALANX, HAND Left 12/29/2022    Procedure: DEBRIDEMENT, PHALANX, HAND;  Surgeon: Abdon Reid Jr., MD;  Location: [facility];  Service: Orthopedics;  Laterality: Left;    DEBRIDEMENT, PHALANX, HAND Left 2/20/2024    Procedure: DEBRIDEMENT, PHALANX, HAND;  Surgeon: Abdon Reid Jr., MD;  Location: [facility];  Service: Orthopedics;  Laterality: Left;    ESOPHAGOGASTRODUODENOSCOPY      HERNIA REPAIR      HERNIA REPAIR      LAPAROSCOPIC APPENDECTOMY N/A 6/18/2025    Procedure: APPENDECTOMY, LAPAROSCOPIC;  Surgeon: Jamshid Bailey MD;  Location: [facility];  Service: General;  Laterality: N/A;       Review of patient's allergies indicates:   Allergen Reactions    Sulfamethoxazole      Other Reaction(s): hives rash    Trimethoprim      Other Reaction(s): hives rash    Bactrim [sulfamethoxazole-trimethoprim] Rash       No current facility-administered medications on file prior to encounter.     Current Outpatient Medications on File Prior to Encounter   Medication Sig    aspirin (ECOTRIN) 81 MG EC tablet Take 81 mg by mouth once daily.    atorvastatin (LIPITOR) 80 MG tablet Take 80 mg by mouth.    cyclobenzaprine (FLEXERIL) 10 MG tablet Take 10 mg by mouth.    empagliflozin (JARDIANCE) 25 mg tablet Take 1 tablet (25 mg total) by mouth once daily.    finasteride (PROSCAR) 5 mg tablet Take 1 tablet (5 mg total) by mouth once daily.    fish oil-omega-3 fatty acids 300-1,000 mg capsule Take 1 capsule by mouth 2 (two) times a day.    HYDROcodone-acetaminophen  "(NORCO) 5-325 mg per tablet Take 1 tablet by mouth every 4 (four) hours as needed for Pain.    insulin aspart U-100 (NOVOLOG FLEXPEN U-100 INSULIN) 100 unit/mL (3 mL) InPn pen Inject 7 Units into the skin 3 (three) times daily with meals.    insulin glargine U-100, Lantus, 100 unit/mL (3 mL) SubQ InPn pen Inject 14 Units into the skin once daily.    tamsulosin (FLOMAX) 0.4 mg Cap Take 1 capsule (0.4 mg total) by mouth 2 (two) times a day.    BAQSIMI 3 mg/actuation [place] 3 mg by Nasal route.    BD ULTRA-FINE JENNY PEN NEEDLE 32 gauge x 5/32" Ndle 4 (four) times daily.    BD ULTRA-FINE JENNY PEN NEEDLE 32 gauge x 5/32" Ndle To inject insulin 4 times daily    blood sugar diagnostic (TRUE METRIX GLUCOSE TEST STRIP) Strp Check BG three times daily    blood-glucose sensor (FREESTYLE JULIO 3 PLUS SENSOR) Sadaf To use for glucose monitoring every 15 days.    flash glucose scanning reader (FREESTYLE JULIO 2 READER) Misc Use as instructed for blood glucose monitoring.    flash glucose sensor (FREESTYLE JULIO 14 DAY SENSOR) Kit Inject 1 each into the skin.    FREESTYLE JULIO 2 SENSOR Kit CHANGE EVERY 14 DAYS FOR BLOOD GLUCOSE MONITORING    FREESTYLE JULIO 3 READER Misc 1 Device by Misc.(Non-Drug; Combo Route) route once. for 1 dose    insulin syringe-needle U-100 1 mL 31 gauge x 5/16 Syrg     meclizine (ANTIVERT) 50 MG tablet Take 50 mg by mouth 2 (two) times daily.    meloxicam (MOBIC) 15 MG tablet Take 15 mg by mouth.    pravastatin (PRAVACHOL) 40 MG tablet Take 40 mg by mouth every evening.    TRUE METRIX GLUCOSE TEST STRIP Strp 1 strip 4 (four) times daily.    TRUEPLUS LANCETS 33 gauge Misc USE TO TEST BLOOD SUGAR TWO TIMES DAILY     Family History       Problem Relation (Age of Onset)    Alzheimer's disease Mother    Cancer Father          Tobacco Use    Smoking status: Every Day     Current packs/day: 2.50     Average packs/day: 2.5 packs/day for 36.5 years (91.2 ttl pk-yrs)     Types: Cigarettes     Start date: 1989    " Smokeless tobacco: Never    Tobacco comments:     Tobacco cessation   Substance and Sexual Activity    Alcohol use: No     Alcohol/week: 0.0 standard drinks of alcohol    Drug use: No     Comment: 30YEARS AGO, PATIENT ON PRESCRIBED PAIN MEDICATION.     Sexual activity: Not Currently     Review of Systems   Constitutional:  Negative for chills and fever.   HENT:  Negative for congestion, postnasal drip and sore throat.    Eyes:  Negative for photophobia.   Respiratory:  Negative for chest tightness and shortness of breath.    Cardiovascular:  Negative for chest pain.   Gastrointestinal:  Negative for abdominal distention, abdominal pain, blood in stool, nausea and vomiting.   Genitourinary:  Negative for dysuria, flank pain and hematuria.   Musculoskeletal:  Negative for back pain.   Skin:  Negative for pallor.   Neurological:  Negative for dizziness, seizures, facial asymmetry, speech difficulty and numbness.   Hematological:  Does not bruise/bleed easily.   Psychiatric/Behavioral:  Negative for agitation and suicidal ideas. The patient is not nervous/anxious.      Objective:     Vital Signs (Most Recent):  Temp: 97.5 °F (36.4 °C) (06/19/25 1515)  Pulse: 99 (06/19/25 1515)  Resp: 20 (06/19/25 1515)  BP: 134/76 (06/19/25 1515)  SpO2: 96 % (06/19/25 1515) Vital Signs (24h Range):  Temp:  [97.5 °F (36.4 °C)-98.2 °F (36.8 °C)] 97.5 °F (36.4 °C)  Pulse:  [] 99  Resp:  [16-22] 20  SpO2:  [93 %-97 %] 96 %  BP: (127-134)/(63-78) 134/76     Weight: 71.3 kg (157 lb 3 oz)  Body mass index is 27.84 kg/m².     Physical Exam  Vitals and nursing note reviewed.   Constitutional:       Appearance: He is well-developed.   HENT:      Head: Normocephalic and atraumatic.      Nose: Nose normal.   Eyes:      Conjunctiva/sclera: Conjunctivae normal.      Pupils: Pupils are equal, round, and reactive to light.   Neck:      Thyroid: No thyromegaly.      Vascular: No JVD.   Cardiovascular:      Rate and Rhythm: Normal rate and regular  "rhythm.      Heart sounds: Normal heart sounds.   Pulmonary:      Effort: Pulmonary effort is normal.      Breath sounds: Normal breath sounds.   Abdominal:      General: Bowel sounds are normal. There is no distension.      Palpations: Abdomen is soft. There is no mass.      Tenderness: There is no abdominal tenderness. There is no guarding.       Musculoskeletal:         General: Normal range of motion.      Cervical back: Normal range of motion and neck supple.   Lymphadenopathy:      Cervical: No cervical adenopathy.   Skin:     General: Skin is warm and dry.      Coloration: Skin is not pale.      Findings: No rash.   Neurological:      Mental Status: He is alert and oriented to person, place, and time.      Cranial Nerves: No cranial nerve deficit.      Deep Tendon Reflexes: Reflexes are normal and symmetric.              CRANIAL NERVES     CN III, IV, VI   Pupils are equal, round, and reactive to light.       Significant Labs: All pertinent labs within the past 24 hours have been reviewed.  A1C:   Recent Labs   Lab 02/11/25  1830 06/17/25  1804   HGBA1C 7.5* 7.3*     CBC:   Recent Labs   Lab 06/18/25  0420 06/19/25  0439   WBC 22.00* 25.60*   HGB 16.0 16.5   HCT 47.3 50.5    238     CMP:   Recent Labs   Lab 06/18/25  0420 06/19/25  0439   * 133*   K 3.9 5.2*    102   CO2 19* 15*   GLU 92 227*   BUN 17 24*   CREATININE 0.8 1.2   CALCIUM 9.0 9.9   PROT 7.3 8.3   ALBUMIN 3.6 3.6   BILITOT 1.3* 1.0   ALKPHOS 80 98   AST 15 15   ALT 20 20   ANIONGAP 11 16     Troponin:   No results for input(s): "TROPONINI", "TROPONINIHS" in the last 48 hours.    Urine Studies:   No results for input(s): "COLORU", "APPEARANCEUA", "PHUR", "SPECGRAV", "PROTEINUA", "GLUCUA", "KETONESU", "BILIRUBINUA", "OCCULTUA", "NITRITE", "UROBILINOGEN", "LEUKOCYTESUR", "RBCUA", "WBCUA", "BACTERIA", "SQUAMEPITHEL", "HYALINECASTS" in the last 48 hours.    Invalid input(s): "WRIGHTSUR"      Significant Imaging: I have reviewed all " pertinent imaging results/findings within the past 24 hours.  CT: I have reviewed all pertinent results/findings within the past 24 hours and my personal findings are:  The appendix is dilated and fluid-filled, and there is surrounding inflammatory fat stranding.  Findings are consistent with acute appendicitis.  There is no abscess or perforation.  The appendix is noted to be retrocecal in location, located in the right mid abdomen.  No inflammation is seen elsewhere in the bowel.  There are few distal colonic diverticula.  There is no free intraperitoneal fluid or air.

## 2025-06-19 NOTE — PLAN OF CARE
Problem: Adult Inpatient Plan of Care  Goal: Optimal Comfort and Wellbeing  Outcome: Progressing     Problem: Fall Injury Risk  Goal: Absence of Fall and Fall-Related Injury  Outcome: Progressing     Problem: Pain Acute  Goal: Optimal Pain Control and Function  Outcome: Progressing     Problem: Infection  Goal: Absence of Infection Signs and Symptoms  Outcome: Progressing

## 2025-06-19 NOTE — ASSESSMENT & PLAN NOTE
Patient's FSGs are uncontrolled due to hyperglycemia on current medication regimen.  Last A1c reviewed-   Lab Results   Component Value Date    HGBA1C 7.3 (H) 06/17/2025     Most recent fingerstick glucose reviewed-   Recent Labs   Lab 06/18/25  1607 06/18/25 2000 06/19/25  0705 06/19/25  1118   POCTGLUCOSE 176* 266* 225* 295*     Current correctional scale  Low  Maintain anti-hyperglycemic dose as follows-   Antihyperglycemics (From admission, onward)      Start     Stop Route Frequency Ordered    06/18/25 2111  insulin aspart U-100 pen 0-5 Units         -- SubQ Before meals & nightly PRN 06/18/25 2011          Hold Oral hypoglycemics while patient is in the hospital.

## 2025-06-19 NOTE — ASSESSMENT & PLAN NOTE
Patient's blood pressure range in the last 24 hours was: BP  Min: 127/64  Max: 134/76.The patient's inpatient anti-hypertensive regimen is listed below:  Current Antihypertensives     Blood pressure is well controlled he is NPO we will hold meds for now    Resume home medications 6/19

## 2025-06-19 NOTE — PROGRESS NOTES
Patient is postop day 1. From laparoscopic appendectomy for gangrenous appendicitis.  White count up to 26,000 today.  Tolerating liquids.  No flatus yet.  Continue IV Zosyn until patient's white blood cell count trends down then he can be discharged home on oral Augmentin for 3-5 days.

## 2025-06-19 NOTE — ASSESSMENT & PLAN NOTE
NPO   IV fluids   Pain control  IV Zosyn  General surgery consulted     6/19 S/p appendectomy.  Continue leukocytosis.  Will continue IV zosyn and repeat CBC tomorrow

## 2025-06-19 NOTE — PLAN OF CARE
Problem: Adult Inpatient Plan of Care  Goal: Plan of Care Review  Outcome: Progressing  Goal: Patient-Specific Goal (Individualized)  Outcome: Progressing  Goal: Absence of Hospital-Acquired Illness or Injury  Outcome: Progressing  Goal: Optimal Comfort and Wellbeing  Outcome: Progressing  Goal: Readiness for Transition of Care  Outcome: Progressing     Problem: Diabetes Comorbidity  Goal: Blood Glucose Level Within Targeted Range  Outcome: Progressing     Problem: Fall Injury Risk  Goal: Absence of Fall and Fall-Related Injury  Outcome: Progressing     Problem: Pain Acute  Goal: Optimal Pain Control and Function  Outcome: Progressing     Problem: Infection  Goal: Absence of Infection Signs and Symptoms  Outcome: Progressing

## 2025-06-20 VITALS
HEIGHT: 63 IN | TEMPERATURE: 98 F | RESPIRATION RATE: 15 BRPM | BODY MASS INDEX: 27.85 KG/M2 | DIASTOLIC BLOOD PRESSURE: 54 MMHG | SYSTOLIC BLOOD PRESSURE: 99 MMHG | HEART RATE: 75 BPM | OXYGEN SATURATION: 99 % | WEIGHT: 157.19 LBS

## 2025-06-20 PROBLEM — Z79.4 TYPE 2 DIABETES MELLITUS, WITH LONG-TERM CURRENT USE OF INSULIN: Status: ACTIVE | Noted: 2022-01-06

## 2025-06-20 PROBLEM — Z90.49 STATUS POST LAPAROSCOPIC APPENDECTOMY: Status: ACTIVE | Noted: 2025-06-20

## 2025-06-20 PROBLEM — Z79.4 TYPE 2 DIABETES MELLITUS WITH KETOACIDOSIS, WITH LONG-TERM CURRENT USE OF INSULIN: Status: ACTIVE | Noted: 2025-06-20

## 2025-06-20 PROBLEM — E11.10 DIABETIC KETOACIDOSIS: Status: ACTIVE | Noted: 2025-06-20

## 2025-06-20 PROBLEM — K37 APPENDICITIS: Status: ACTIVE | Noted: 2025-06-20

## 2025-06-20 LAB
ABSOLUTE EOSINOPHIL (OHS): 0.01 K/UL
ABSOLUTE MONOCYTE (OHS): 0.99 K/UL (ref 0.3–1)
ABSOLUTE NEUTROPHIL COUNT (OHS): 19.93 K/UL (ref 1.8–7.7)
ALBUMIN SERPL BCP-MCNC: 3.5 G/DL (ref 3.5–5.2)
ALLENS TEST: ABNORMAL
ALLENS TEST: ABNORMAL
ALP SERPL-CCNC: 132 UNIT/L (ref 40–150)
ALT SERPL W/O P-5'-P-CCNC: 19 UNIT/L (ref 10–44)
ANION GAP (OHS): 12 MMOL/L (ref 8–16)
ANION GAP (OHS): 23 MMOL/L (ref 8–16)
AST SERPL-CCNC: 17 UNIT/L (ref 11–45)
B-OH-BUTYR BLD STRIP-SCNC: 5.2 MMOL/L
BACTERIA #/AREA URNS HPF: ABNORMAL /HPF
BASOPHILS # BLD AUTO: 0.19 K/UL
BASOPHILS NFR BLD AUTO: 0.8 %
BILIRUB SERPL-MCNC: 0.6 MG/DL (ref 0.1–1)
BILIRUB UR QL STRIP.AUTO: NEGATIVE
BNP SERPL-MCNC: 15 PG/ML (ref 0–99)
BUN SERPL-MCNC: 33 MG/DL (ref 8–23)
BUN SERPL-MCNC: 37 MG/DL (ref 8–23)
CALCIUM SERPL-MCNC: 10.6 MG/DL (ref 8.7–10.5)
CALCIUM SERPL-MCNC: 9.1 MG/DL (ref 8.7–10.5)
CHLORIDE SERPL-SCNC: 102 MMOL/L (ref 95–110)
CHLORIDE SERPL-SCNC: 106 MMOL/L (ref 95–110)
CLARITY UR: CLEAR
CO2 SERPL-SCNC: 15 MMOL/L (ref 23–29)
CO2 SERPL-SCNC: 6 MMOL/L (ref 23–29)
COLOR UR AUTO: YELLOW
CREAT SERPL-MCNC: 1.2 MG/DL (ref 0.5–1.4)
CREAT SERPL-MCNC: 1.6 MG/DL (ref 0.5–1.4)
DELSYS: ABNORMAL
DELSYS: ABNORMAL
ERYTHROCYTE [DISTWIDTH] IN BLOOD BY AUTOMATED COUNT: 14 % (ref 11.5–14.5)
FIO2: 21 (ref 21–100)
FIO2: 21 (ref 21–100)
GFR SERPLBLD CREATININE-BSD FMLA CKD-EPI: 49 ML/MIN/1.73/M2
GFR SERPLBLD CREATININE-BSD FMLA CKD-EPI: >60 ML/MIN/1.73/M2
GLUCOSE SERPL-MCNC: 173 MG/DL (ref 70–110)
GLUCOSE SERPL-MCNC: 314 MG/DL (ref 70–110)
GLUCOSE UR QL STRIP: ABNORMAL
HCO3 UR-SCNC: 10.4 MMOL/L (ref 22–26)
HCO3 UR-SCNC: 4.4 MMOL/L (ref 22–26)
HCT VFR BLD AUTO: 51 % (ref 40–54)
HGB BLD-MCNC: 15.9 GM/DL (ref 14–18)
HGB UR QL STRIP: ABNORMAL
HOLD SPECIMEN: NORMAL
HYALINE CASTS #/AREA URNS LPF: 0 /LPF (ref 0–1)
IMM GRANULOCYTES # BLD AUTO: 0.72 K/UL (ref 0–0.04)
IMM GRANULOCYTES NFR BLD AUTO: 3.2 % (ref 0–0.5)
KETONES UR QL STRIP: ABNORMAL
LACTATE SERPL-SCNC: 1.8 MMOL/L (ref 0.5–2.2)
LACTATE SERPL-SCNC: 1.8 MMOL/L (ref 0.5–2.2)
LACTATE SERPL-SCNC: 2.2 MMOL/L (ref 0.5–2.2)
LEUKOCYTE ESTERASE UR QL STRIP: NEGATIVE
LYMPHOCYTES # BLD AUTO: 0.59 K/UL (ref 1–4.8)
MAGNESIUM SERPL-MCNC: 2.7 MG/DL (ref 1.6–2.6)
MCH RBC QN AUTO: 28.8 PG (ref 27–31)
MCHC RBC AUTO-ENTMCNC: 31.2 G/DL (ref 32–36)
MCV RBC AUTO: 92 FL (ref 82–98)
MICROSCOPIC COMMENT: ABNORMAL
NITRITE UR QL STRIP: NEGATIVE
NUCLEATED RBC (/100WBC) (OHS): 0 /100 WBC
OHS QRS DURATION: 102 MS
OHS QTC CALCULATION: 432 MS
PCO2 BLDA: 19 MMHG (ref 35–45)
PCO2 BLDA: 26 MMHG (ref 35–45)
PH SMN: 6.97 [PH] (ref 7.35–7.45)
PH SMN: 7.21 [PH] (ref 7.35–7.45)
PH UR STRIP: 5 [PH]
PLATELET # BLD AUTO: 260 K/UL (ref 150–450)
PMV BLD AUTO: 9.4 FL (ref 9.2–12.9)
PO2 BLDA: 105 MMHG (ref 75–100)
PO2 BLDA: 89 MMHG (ref 75–100)
POC BE: -15.7 MMOL/L (ref -2–2)
POC BE: -26 MMOL/L (ref -2–2)
POC COHB: 1.3 % (ref 0–3)
POC COHB: 2.1 % (ref 0–3)
POC METHB: 0.8 % (ref 0–1.5)
POC METHB: 1.2 % (ref 0–1.5)
POC O2HB ARTERIAL: 94.4 % (ref 94–100)
POC O2HB ARTERIAL: 94.7 % (ref 94–100)
POC SATURATED O2: 96.8 % (ref 90–100)
POC SATURATED O2: 97.6 % (ref 90–100)
POC TCO2: 11.2 MMOL/L
POC TCO2: 5 MMOL/L
POC THB: 15.2 G/DL (ref 12–18)
POC THB: 16 G/DL (ref 12–18)
POCT GLUCOSE: 149 MG/DL (ref 70–110)
POCT GLUCOSE: 171 MG/DL (ref 70–110)
POCT GLUCOSE: 180 MG/DL (ref 70–110)
POCT GLUCOSE: 195 MG/DL (ref 70–110)
POCT GLUCOSE: 226 MG/DL (ref 70–110)
POCT GLUCOSE: 258 MG/DL (ref 70–110)
POCT GLUCOSE: 283 MG/DL (ref 70–110)
POCT GLUCOSE: 288 MG/DL (ref 70–110)
POCT GLUCOSE: 306 MG/DL (ref 70–110)
POCT GLUCOSE: 349 MG/DL (ref 70–110)
POCT GLUCOSE: 371 MG/DL (ref 70–110)
POTASSIUM SERPL-SCNC: 4.5 MMOL/L (ref 3.5–5.1)
POTASSIUM SERPL-SCNC: 5.7 MMOL/L (ref 3.5–5.1)
PROT SERPL-MCNC: 8.3 GM/DL (ref 6–8.4)
PROT UR QL STRIP: ABNORMAL
RBC # BLD AUTO: 5.53 M/UL (ref 4.6–6.2)
RBC #/AREA URNS HPF: 6 /HPF (ref 0–4)
RELATIVE EOSINOPHIL (OHS): 0 %
RELATIVE LYMPHOCYTE (OHS): 2.6 % (ref 18–48)
RELATIVE MONOCYTE (OHS): 4.4 % (ref 4–15)
RELATIVE NEUTROPHIL (OHS): 89 % (ref 38–73)
SITE: ABNORMAL
SITE: ABNORMAL
SODIUM SERPL-SCNC: 131 MMOL/L (ref 136–145)
SODIUM SERPL-SCNC: 133 MMOL/L (ref 136–145)
SP GR UR STRIP: 1.01
TROPONIN I SERPL DL<=0.01 NG/ML-MCNC: <0.006 NG/ML
UROBILINOGEN UR STRIP-ACNC: NEGATIVE EU/DL
WBC # BLD AUTO: 22.43 K/UL (ref 3.9–12.7)
WBC #/AREA URNS HPF: 0 /HPF (ref 0–5)

## 2025-06-20 PROCEDURE — 36415 COLL VENOUS BLD VENIPUNCTURE: CPT | Performed by: PHYSICIAN ASSISTANT

## 2025-06-20 PROCEDURE — 82803 BLOOD GASES ANY COMBINATION: CPT | Performed by: PHYSICIAN ASSISTANT

## 2025-06-20 PROCEDURE — 83605 ASSAY OF LACTIC ACID: CPT | Performed by: INTERNAL MEDICINE

## 2025-06-20 PROCEDURE — S5010 5% DEXTROSE AND 0.45% SALINE: HCPCS | Performed by: PHYSICIAN ASSISTANT

## 2025-06-20 PROCEDURE — 63600175 PHARM REV CODE 636 W HCPCS: Performed by: INTERNAL MEDICINE

## 2025-06-20 PROCEDURE — 80053 COMPREHEN METABOLIC PANEL: CPT | Performed by: PHYSICIAN ASSISTANT

## 2025-06-20 PROCEDURE — 83880 ASSAY OF NATRIURETIC PEPTIDE: CPT | Performed by: PHYSICIAN ASSISTANT

## 2025-06-20 PROCEDURE — 25000003 PHARM REV CODE 250: Performed by: INTERNAL MEDICINE

## 2025-06-20 PROCEDURE — 93005 ELECTROCARDIOGRAM TRACING: CPT

## 2025-06-20 PROCEDURE — 82803 BLOOD GASES ANY COMBINATION: CPT | Performed by: INTERNAL MEDICINE

## 2025-06-20 PROCEDURE — 36600 WITHDRAWAL OF ARTERIAL BLOOD: CPT

## 2025-06-20 PROCEDURE — 83735 ASSAY OF MAGNESIUM: CPT | Performed by: PHYSICIAN ASSISTANT

## 2025-06-20 PROCEDURE — 25000003 PHARM REV CODE 250: Performed by: PHYSICIAN ASSISTANT

## 2025-06-20 PROCEDURE — 81003 URINALYSIS AUTO W/O SCOPE: CPT | Performed by: PHYSICIAN ASSISTANT

## 2025-06-20 PROCEDURE — 87040 BLOOD CULTURE FOR BACTERIA: CPT | Performed by: INTERNAL MEDICINE

## 2025-06-20 PROCEDURE — 85025 COMPLETE CBC W/AUTO DIFF WBC: CPT | Performed by: PHYSICIAN ASSISTANT

## 2025-06-20 PROCEDURE — 99900035 HC TECH TIME PER 15 MIN (STAT)

## 2025-06-20 PROCEDURE — 36415 COLL VENOUS BLD VENIPUNCTURE: CPT | Performed by: INTERNAL MEDICINE

## 2025-06-20 PROCEDURE — 94760 N-INVAS EAR/PLS OXIMETRY 1: CPT | Mod: XB

## 2025-06-20 PROCEDURE — 84484 ASSAY OF TROPONIN QUANT: CPT | Performed by: PHYSICIAN ASSISTANT

## 2025-06-20 PROCEDURE — 82010 KETONE BODYS QUAN: CPT | Performed by: INTERNAL MEDICINE

## 2025-06-20 PROCEDURE — 63600175 PHARM REV CODE 636 W HCPCS: Performed by: NURSE PRACTITIONER

## 2025-06-20 PROCEDURE — 93010 ELECTROCARDIOGRAM REPORT: CPT | Mod: ,,, | Performed by: INTERNAL MEDICINE

## 2025-06-20 RX ORDER — SODIUM CHLORIDE 9 MG/ML
1000 INJECTION, SOLUTION INTRAVENOUS CONTINUOUS
Status: DISCONTINUED | OUTPATIENT
Start: 2025-06-20 | End: 2025-06-20

## 2025-06-20 RX ORDER — DEXTROSE MONOHYDRATE AND SODIUM CHLORIDE 5; .45 G/100ML; G/100ML
INJECTION, SOLUTION INTRAVENOUS CONTINUOUS PRN
Status: DISCONTINUED | OUTPATIENT
Start: 2025-06-20 | End: 2025-06-20

## 2025-06-20 RX ORDER — SODIUM CHLORIDE 0.9 % (FLUSH) 0.9 %
10 SYRINGE (ML) INJECTION
Status: DISCONTINUED | OUTPATIENT
Start: 2025-06-20 | End: 2025-06-20 | Stop reason: HOSPADM

## 2025-06-20 RX ORDER — DEXTROSE MONOHYDRATE AND SODIUM CHLORIDE 5; .45 G/100ML; G/100ML
INJECTION, SOLUTION INTRAVENOUS CONTINUOUS PRN
Status: DISCONTINUED | OUTPATIENT
Start: 2025-06-20 | End: 2025-06-20 | Stop reason: HOSPADM

## 2025-06-20 RX ORDER — SODIUM CHLORIDE 9 MG/ML
1000 INJECTION, SOLUTION INTRAVENOUS CONTINUOUS
Status: ACTIVE | OUTPATIENT
Start: 2025-06-20 | End: 2025-06-20

## 2025-06-20 RX ORDER — FAMOTIDINE 20 MG/1
20 TABLET, FILM COATED ORAL DAILY
Status: DISCONTINUED | OUTPATIENT
Start: 2025-06-20 | End: 2025-06-20 | Stop reason: HOSPADM

## 2025-06-20 RX ORDER — SODIUM CHLORIDE 9 MG/ML
INJECTION, SOLUTION INTRAVENOUS CONTINUOUS
Status: DISCONTINUED | OUTPATIENT
Start: 2025-06-20 | End: 2025-06-20

## 2025-06-20 RX ADMIN — INSULIN HUMAN 0.05 UNITS/KG/HR: 1 INJECTION, SOLUTION INTRAVENOUS at 03:06

## 2025-06-20 RX ADMIN — PIPERACILLIN SODIUM AND TAZOBACTAM SODIUM 4.5 G: 4; .5 INJECTION, POWDER, LYOPHILIZED, FOR SOLUTION INTRAVENOUS at 06:06

## 2025-06-20 RX ADMIN — ONDANSETRON 4 MG: 2 INJECTION INTRAMUSCULAR; INTRAVENOUS at 07:06

## 2025-06-20 RX ADMIN — DEXTROSE AND SODIUM CHLORIDE: 5; 450 INJECTION, SOLUTION INTRAVENOUS at 02:06

## 2025-06-20 RX ADMIN — SODIUM CHLORIDE 1000 ML: 9 INJECTION, SOLUTION INTRAVENOUS at 09:06

## 2025-06-20 RX ADMIN — INSULIN HUMAN 0.1 UNITS/KG/HR: 1 INJECTION, SOLUTION INTRAVENOUS at 09:06

## 2025-06-20 RX ADMIN — SODIUM CHLORIDE: 9 INJECTION, SOLUTION INTRAVENOUS at 07:06

## 2025-06-20 RX ADMIN — PIPERACILLIN SODIUM AND TAZOBACTAM SODIUM 4.5 G: 4; .5 INJECTION, POWDER, LYOPHILIZED, FOR SOLUTION INTRAVENOUS at 02:06

## 2025-06-20 RX ADMIN — SODIUM BICARBONATE: 84 INJECTION, SOLUTION INTRAVENOUS at 07:06

## 2025-06-20 NOTE — ASSESSMENT & PLAN NOTE
Patient's FSGs are controlled on current medication regimen.  Last A1c reviewed-   Lab Results   Component Value Date    HGBA1C 7.3 (H) 06/17/2025     Most recent fingerstick glucose reviewed-   Recent Labs   Lab 06/20/25  1023 06/20/25  1130 06/20/25  1230 06/20/25  1331   POCTGLUCOSE 349* 288* 283* 226*     Current correctional scale  Low  Maintain anti-hyperglycemic dose as follows-   Antihyperglycemics (From admission, onward)      Start     Stop Route Frequency Ordered    06/20/25 0915  insulin regular in 0.9 % NaCl 100 unit/100 mL (1 unit/mL) infusion        Question Answer Comment   Insulin Rate Adjustment (DO NOT MODIFY ANSWER) \\[employer] [email or web address]\[email or web address]\Images\Pharmacy\InsulinInfusions\INSULIN ADJUSTMENT DKA version [id number].pdf    Initial dose (DO NOT CHANGE): 0.1 units/kg/hr        -- IV Continuous 06/20/25 0903    06/18/25 2111  insulin aspart U-100 pen 0-5 Units         -- SubQ Before meals & nightly PRN 06/18/25 2011          Hold Oral hypoglycemics while patient is in the hospital.    6/20:Beta hydroxybutyrate elevated, 4+ ketones, anion gap metabolic acidosis.  Bicarb ordered.  Insulin gtt started and transfer to higher level of care.

## 2025-06-20 NOTE — PROGRESS NOTES
[facility] Medicine  Progress Note    Patient Name: Ross Crystal  MRN: [id number]  Patient Class: IP- Inpatient   Admission Date: 6/17/2025  Length of Stay: 2 days  Attending Physician: Bhupinder Erwin MD  Primary Care Provider: Ross Valverde MD        Subjective     Principal Problem:Diabetic ketoacidosis        HPI:  Ross Crystal is a 60 y.o. male  with a past medical history of T2DM, HTN, HLD, Neuropathy here for for acute appendicitis.  He reports right lower quadrant pain for 2 days.  The pain has been getting worse, 8/10 in intensity, worse with movement and coughing.  He denies vomiting.  Some nausea is reported.  Workup in the emergency room showed leukocytosis with white count more than 21,000.  CT scan showed The appendix is dilated and fluid-filled, and there is surrounding inflammatory fat stranding.  Findings are consistent with acute appendicitis.  There is no abscess or perforation.  The appendix is noted to be retrocecal in location, located in the right mid abdomen.  No inflammation is seen elsewhere in the bowel.  There are few distal colonic diverticula.  There is no free intraperitoneal fluid or air.   Acute appendicitis without abscess or perforation.    I saw him on 3rd floor where he has been admitted to hospital service after surgical consultation.  He has been placed on OR list for tomorrow morning.  He will be NPO.  Pain control.  IV Zosyn.    Overview/Hospital Course:  Admitted for appendicitis.  IV fluids, IV zosyn and IV pain control.  General surgery consulted for appendectomy.      6/19 S/p appendectomy.  Unable to discharge home due to up trending leukocytosis.  Continue IV zosyn and repeat CBC tomorrow.      6/20 Pt HD stable on room air.  Patient has had a persistent leukocytosis and developed anion gap overnight.  Metabolic acidosis seen on abg.  4+ ketones and elevated betahydroxybutyrate. Bicarb ordered and insulin gtt started for DKA.     Past  Medical History:   Diagnosis Date    Benign paroxysmal vertigo, bilateral     Diabetes mellitus     Diabetes mellitus type I     Hyperlipemia     Renal disorder     Urinary tract infection        Past Surgical History:   Procedure Laterality Date    CARPAL TUNNEL RELEASE  25 YEARS AGO    COLONOSCOPY      COLONOSCOPY N/A 1/25/2016    Procedure: COLONOSCOPY;  Surgeon: Luis Bogran-Reyes, MD;  Location: [facility];  Service: Endoscopy;  Laterality: N/A;    DEBRIDEMENT, PHALANX, HAND Left 12/29/2022    Procedure: DEBRIDEMENT, PHALANX, HAND;  Surgeon: Abdon Reid Jr., MD;  Location: [facility];  Service: Orthopedics;  Laterality: Left;    DEBRIDEMENT, PHALANX, HAND Left 2/20/2024    Procedure: DEBRIDEMENT, PHALANX, HAND;  Surgeon: Abdon Reid Jr., MD;  Location: [facility];  Service: Orthopedics;  Laterality: Left;    ESOPHAGOGASTRODUODENOSCOPY      HERNIA REPAIR      HERNIA REPAIR      LAPAROSCOPIC APPENDECTOMY N/A 6/18/2025    Procedure: APPENDECTOMY, LAPAROSCOPIC;  Surgeon: Jamshid Bailey MD;  Location: [facility];  Service: General;  Laterality: N/A;       Review of patient's allergies indicates:   Allergen Reactions    Sulfamethoxazole      Other Reaction(s): hives rash    Trimethoprim      Other Reaction(s): hives rash    Bactrim [sulfamethoxazole-trimethoprim] Rash       No current facility-administered medications on file prior to encounter.     Current Outpatient Medications on File Prior to Encounter   Medication Sig    aspirin (ECOTRIN) 81 MG EC tablet Take 81 mg by mouth once daily.    atorvastatin (LIPITOR) 80 MG tablet Take 80 mg by mouth.    cyclobenzaprine (FLEXERIL) 10 MG tablet Take 10 mg by mouth.    empagliflozin (JARDIANCE) 25 mg tablet Take 1 tablet (25 mg total) by mouth once daily.    finasteride (PROSCAR) 5 mg tablet Take 1 tablet (5 mg total) by mouth once daily.    fish oil-omega-3 fatty acids 300-1,000 mg capsule Take 1 capsule by mouth 2 (two) times a day.    HYDROcodone-acetaminophen  "(NORCO) 5-325 mg per tablet Take 1 tablet by mouth every 4 (four) hours as needed for Pain.    insulin aspart U-100 (NOVOLOG FLEXPEN U-100 INSULIN) 100 unit/mL (3 mL) InPn pen Inject 7 Units into the skin 3 (three) times daily with meals.    insulin glargine U-100, Lantus, 100 unit/mL (3 mL) SubQ InPn pen Inject 14 Units into the skin once daily.    tamsulosin (FLOMAX) 0.4 mg Cap Take 1 capsule (0.4 mg total) by mouth 2 (two) times a day.    BAQSIMI 3 mg/actuation [place] 3 mg by Nasal route.    BD ULTRA-FINE JENNY PEN NEEDLE 32 gauge x 5/32" Ndle 4 (four) times daily.    BD ULTRA-FINE JENNY PEN NEEDLE 32 gauge x 5/32" Ndle To inject insulin 4 times daily    blood sugar diagnostic (TRUE METRIX GLUCOSE TEST STRIP) Strp Check BG three times daily    blood-glucose sensor (FREESTYLE JULIO 3 PLUS SENSOR) Sadaf To use for glucose monitoring every 15 days.    flash glucose scanning reader (FREESTYLE JULIO 2 READER) Misc Use as instructed for blood glucose monitoring.    flash glucose sensor (FREESTYLE JULIO 14 DAY SENSOR) Kit Inject 1 each into the skin.    FREESTYLE JULIO 2 SENSOR Kit CHANGE EVERY 14 DAYS FOR BLOOD GLUCOSE MONITORING    FREESTYLE JULIO 3 READER Misc 1 Device by Misc.(Non-Drug; Combo Route) route once. for 1 dose    insulin syringe-needle U-100 1 mL 31 gauge x 5/16 Syrg     meclizine (ANTIVERT) 50 MG tablet Take 50 mg by mouth 2 (two) times daily.    meloxicam (MOBIC) 15 MG tablet Take 15 mg by mouth.    pravastatin (PRAVACHOL) 40 MG tablet Take 40 mg by mouth every evening.    TRUE METRIX GLUCOSE TEST STRIP Strp 1 strip 4 (four) times daily.    TRUEPLUS LANCETS 33 gauge Misc USE TO TEST BLOOD SUGAR TWO TIMES DAILY     Family History       Problem Relation (Age of Onset)    Alzheimer's disease Mother    Cancer Father          Tobacco Use    Smoking status: Every Day     Current packs/day: 2.50     Average packs/day: 2.5 packs/day for 36.5 years (91.2 ttl pk-yrs)     Types: Cigarettes     Start date: 1989    " Smokeless tobacco: Never    Tobacco comments:     Tobacco cessation   Substance and Sexual Activity    Alcohol use: No     Alcohol/week: 0.0 standard drinks of alcohol    Drug use: No     Comment: 30YEARS AGO, PATIENT ON PRESCRIBED PAIN MEDICATION.     Sexual activity: Not Currently     Review of Systems   Constitutional:  Negative for chills and fever.   HENT:  Negative for congestion, postnasal drip and sore throat.    Eyes:  Negative for photophobia.   Respiratory:  Negative for chest tightness and shortness of breath.    Cardiovascular:  Negative for chest pain.   Gastrointestinal:  Negative for abdominal distention, abdominal pain, blood in stool, nausea and vomiting.   Genitourinary:  Negative for dysuria, flank pain and hematuria.   Musculoskeletal:  Negative for back pain.   Skin:  Negative for pallor.   Neurological:  Negative for dizziness, seizures, facial asymmetry, speech difficulty and numbness.   Hematological:  Does not bruise/bleed easily.   Psychiatric/Behavioral:  Negative for agitation and suicidal ideas. The patient is not nervous/anxious.      Objective:     Vital Signs (Most Recent):  Temp: 97.7 °F (36.5 °C) (06/20/25 0804)  Pulse: 90 (06/20/25 1232)  Resp: 20 (06/20/25 1228)  BP: 120/60 (06/20/25 1232)  SpO2: 100 % (06/20/25 1232) Vital Signs (24h Range):  Temp:  [97.3 °F (36.3 °C)-97.9 °F (36.6 °C)] 97.7 °F (36.5 °C)  Pulse:  [] 90  Resp:  [16-22] 20  SpO2:  [94 %-100 %] 100 %  BP: (117-175)/(60-80) 120/60     Weight: 71.3 kg (157 lb 3 oz)  Body mass index is 27.84 kg/m².     Physical Exam  Vitals and nursing note reviewed.   Constitutional:       Appearance: He is well-developed.   HENT:      Head: Normocephalic and atraumatic.      Nose: Nose normal.   Eyes:      Conjunctiva/sclera: Conjunctivae normal.      Pupils: Pupils are equal, round, and reactive to light.   Neck:      Thyroid: No thyromegaly.      Vascular: No JVD.   Cardiovascular:      Rate and Rhythm: Normal rate and  regular rhythm.      Heart sounds: Normal heart sounds.   Pulmonary:      Effort: Pulmonary effort is normal.      Breath sounds: Normal breath sounds.   Abdominal:      General: Bowel sounds are normal. There is no distension.      Palpations: Abdomen is soft. There is no mass.      Tenderness: There is no abdominal tenderness. There is no guarding.       Musculoskeletal:         General: Normal range of motion.      Cervical back: Normal range of motion and neck supple.   Lymphadenopathy:      Cervical: No cervical adenopathy.   Skin:     General: Skin is warm and dry.      Coloration: Skin is not pale.      Findings: No rash.   Neurological:      Mental Status: He is alert and oriented to person, place, and time.      Cranial Nerves: No cranial nerve deficit.      Deep Tendon Reflexes: Reflexes are normal and symmetric.              CRANIAL NERVES     CN III, IV, VI   Pupils are equal, round, and reactive to light.       Significant Labs: All pertinent labs within the past 24 hours have been reviewed.  A1C:   Recent Labs   Lab 02/11/25  1830 06/17/25  1804   HGBA1C 7.5* 7.3*     CBC:   Recent Labs   Lab 06/19/25  0439 06/20/25  0416   WBC 25.60* 22.43*   HGB 16.5 15.9   HCT 50.5 51.0    260     CMP:   Recent Labs   Lab 06/19/25  0439 06/20/25  0416   * 131*   K 5.2* 5.7*    102   CO2 15* 6*   * 314*   BUN 24* 37*   CREATININE 1.2 1.6*   CALCIUM 9.9 10.6*   PROT 8.3 8.3   ALBUMIN 3.6 3.5   BILITOT 1.0 0.6   ALKPHOS 98 132   AST 15 17   ALT 20 19   ANIONGAP 16 23*     Troponin:   Recent Labs   Lab 06/20/25  0902   TROPONINI <0.006       Urine Studies:   Recent Labs   Lab 06/20/25  0902   COLORU Yellow   APPEARANCEUA Clear   PHUR 5.0   SPECGRAV 1.015   PROTEINUA 1+*   GLUCUA 2+*   BILIRUBINUA Negative   OCCULTUA Trace*   NITRITE Negative   UROBILINOGEN Negative   LEUKOCYTESUR Negative   RBCUA 6*   WBCUA 0   BACTERIA Rare   HYALINECASTS 0         Significant Imaging: I have reviewed all  pertinent imaging results/findings within the past 24 hours.  CT: I have reviewed all pertinent results/findings within the past 24 hours and my personal findings are:  The appendix is dilated and fluid-filled, and there is surrounding inflammatory fat stranding.  Findings are consistent with acute appendicitis.  There is no abscess or perforation.  The appendix is noted to be retrocecal in location, located in the right mid abdomen.  No inflammation is seen elsewhere in the bowel.  There are few distal colonic diverticula.  There is no free intraperitoneal fluid or air.      Assessment & Plan  Essential hypertension  Patient's blood pressure range in the last 24 hours was: BP  Min: 117/60  Max: 175/79.The patient's inpatient anti-hypertensive regimen is listed below:  Current Antihypertensives     Blood pressure is well controlled he is NPO we will hold meds for now    Resume home medications 6/19  Pure hypercholesterolemia  He is on statin.  Hold for now    Resume home statin 6/19    Type 2 diabetes mellitus without complication  Patient's FSGs are uncontrolled due to hyperglycemia on current medication regimen.  Last A1c reviewed-   Lab Results   Component Value Date    HGBA1C 7.3 (H) 06/17/2025     Most recent fingerstick glucose reviewed-   Recent Labs   Lab 06/20/25  1023 06/20/25  1130 06/20/25  1230 06/20/25  1331   POCTGLUCOSE 349* 288* 283* 226*     Current correctional scale  Low  Maintain anti-hyperglycemic dose as follows-   Antihyperglycemics (From admission, onward)      Start     Stop Route Frequency Ordered    06/20/25 0915  insulin regular in 0.9 % NaCl 100 unit/100 mL (1 unit/mL) infusion        Question Answer Comment   Insulin Rate Adjustment (DO NOT MODIFY ANSWER) \\[email or web address]\epic\Images\Pharmacy\InsulinInfusions\INSULIN ADJUSTMENT DKA version [id number].pdf    Initial dose (DO NOT CHANGE): 0.1 units/kg/hr        -- IV Continuous 06/20/25 0903    06/18/25 2111  insulin aspart U-100 pen 0-5 Units          -- SubQ Before meals & nightly PRN 06/18/25 2011          Hold Oral hypoglycemics while patient is in the hospital.  BPH (benign prostatic hyperplasia)  Resume home flomax and finasteride    Acute appendicitis with localized peritonitis, without perforation, abscess, or gangrene  NPO   IV fluids   Pain control  IV Zosyn  General surgery consulted     6/19 S/p appendectomy.  Continue leukocytosis.  Will continue IV zosyn and repeat CBC tomorrow     6/20 continued leukocytosis     Leukocytosis  White count initially elevated due to acute appendicitis  Trending up after appendectomy  Repeat CBC.  Would like to make sure improving before discharge.  Continue IV zosyn and general surgery recommending discharge with po abx.      Continues to be elevated   Diabetic ketoacidosis  Patient's FSGs are controlled on current medication regimen.  Last A1c reviewed-   Lab Results   Component Value Date    HGBA1C 7.3 (H) 06/17/2025     Most recent fingerstick glucose reviewed-   Recent Labs   Lab 06/20/25  1023 06/20/25  1130 06/20/25  1230 06/20/25  1331   POCTGLUCOSE 349* 288* 283* 226*     Current correctional scale  Low  Maintain anti-hyperglycemic dose as follows-   Antihyperglycemics (From admission, onward)      Start     Stop Route Frequency Ordered    06/20/25 0915  insulin regular in 0.9 % NaCl 100 unit/100 mL (1 unit/mL) infusion        Question Answer Comment   Insulin Rate Adjustment (DO NOT MODIFY ANSWER) \\[email or web address]\epic\Images\Pharmacy\InsulinInfusions\INSULIN ADJUSTMENT DKA version [id number].pdf    Initial dose (DO NOT CHANGE): 0.1 units/kg/hr        -- IV Continuous 06/20/25 0903    06/18/25 2111  insulin aspart U-100 pen 0-5 Units         -- SubQ Before meals & nightly PRN 06/18/25 2011          Hold Oral hypoglycemics while patient is in the hospital.    6/20:Beta hydroxybutyrate elevated, 4+ ketones, anion gap metabolic acidosis.  Bicarb ordered.  Insulin gtt started and transfer to higher level of care.     VTE Risk Mitigation (From admission, onward)           Ordered     IP VTE HIGH RISK PATIENT  Once         06/20/25 0903     Place sequential compression device  Until discontinued         06/20/25 0903     Place sequential compression device  Until discontinued         06/20/25 0825                    Discharge Planning   NAZ: 6/20/2025     Code Status: Full Code   Medical Readiness for Discharge Date: 6/19/2025  Discharge Plan A: Home                        Sherie Lezama PA-C  Department of Hospital Medicine   [facility] - Emergency Dept

## 2025-06-20 NOTE — NURSING
Pt transferred to ED room 5 due to needing to be transferred to a facility with ICU. Pt VSS and free of falls. Bedside handoff given to ROBERTO Marino.

## 2025-06-20 NOTE — PROGRESS NOTES
[place] - [facility] Surg ([facility])  Surgery  Post operative SOAP note    Patient Name: Ross Crystal  MRN: [id number]  Admission Date: 6/17/2025  Hospital Length of Stay: 2 days  Code Status: Full Code   Attending Provider: Bhupinder Erwin MD   Consulting Provider: Jamshid Bailey MD  Primary Care Physician: Ross Valverde MD  Principal Problem:Acute appendicitis with localized peritonitis, without perforation, abscess, or gangrene    Consults  Subjective:     Subjective:  pod#2, patient aggravated about his persistently elevated blood glucose levels.  Agitated.  Per daughter the patient had large volume emesis this am.  Passing flatus but no bm.  Discussed with nusing this am that dr. Prince has put in for  a transfer to a facility with icu for probable dka.      Objective:          Vital Signs (Most Recent):  Temp: 97.9 °F (36.6 °C) (06/20/25 0412)  Pulse: 109 (06/20/25 0600)  Resp: 20 (06/20/25 0412)  BP: 130/80 (06/20/25 0630)  SpO2: 95 % (06/20/25 0412) Vital Signs (24h Range):  Temp:  [97.3 °F (36.3 °C)-97.9 °F (36.6 °C)] 97.9 °F (36.6 °C)  Pulse:  [] 109  Resp:  [16-22] 20  SpO2:  [94 %-97 %] 95 %  BP: (127-152)/(63-80) 130/80     Weight: 71.3 kg (157 lb 3 oz)  Body mass index is 27.84 kg/m².    SpO2: 95 %         Intake/Output Summary (Last 24 hours) at 6/20/2025 0710  Last data filed at 6/19/2025 2219  Gross per 24 hour   Intake 395.08 ml   Output 300 ml   Net 95.08 ml       Lines/Drains/Airways       Peripheral Intravenous Line  Duration                  Peripheral IV - Single Lumen 06/17/25 1337 20 G Left Antecubital 2 days                    Physical Exam:  Gen:in bed, agitated  HEENT:wnl  CV:rrr  Chest:ctab  Abdomen:soft, slight tympany but not severe, flakito incisional tenderness   Extremity:wnl/scd    Significant Labs:   Recent Lab Results  (Last 5 results in the past 24 hours)        06/20/25  0645   06/20/25  0416   06/20/25  0410   06/19/25  1958   06/19/25  1622        [facility]                POC O2Hb Arterial 94.7               POC COHb 1.3               POC MetHb 0.8               Albumin   3.5             ALP   132             Allens Test Pass               ALT   19             Anion Gap   23             AST   17             Baso #   0.19             Basophil %   0.8             BILIRUBIN TOTAL   0.6  Comment: For infants and newborns, interpretation of results should be based   on gestational age, weight and in agreement with clinical   observations.    Premature Infant recommended reference ranges:   0-24 hours:  <8.0 mg/dL   24-48 hours: <12.0 mg/dL   3-5 days:    <15.0 mg/dL   6-29 days:   <15.0 mg/dL             Site Right Radial               BUN   37             Calcium   10.6             Chloride   102             CO2   6             Creatinine   1.6             DelSys Room Air               eGFR   49  Comment: Estimated GFR calculated using the CKD-EPI creatinine (2021) equation.             Eos #   0.01             Eos %   0.0             FiO2 21               Glucose   314             Gran # (ANC)   19.93             Hematocrit   51.0             Hemoglobin   15.9             Immature Grans (Abs)   0.72  Comment: Mild elevation in immature granulocytes is non specific and can be seen in a variety of conditions including stress response, acute inflammation, trauma and pregnancy. Correlation with other laboratory and clinical findings is essential.             Immature Granulocytes   3.2             Lymph #   0.59             Lymph %   2.6             Magnesium    2.7             MCH   28.8             MCHC   31.2             MCV   92             Mono #   0.99             Mono %   4.4             MPV   9.4             Neut %   89.0             nRBC   0             Platelet Count   260             POC BE -26.00               POC HCO3 4.40               POC PCO2 19               POC PH 6.970               POC PO2 105               POC SATURATED O2 96.8               POC TCO2 5                POCT Glucose     258   249   268       Potassium   5.7             PROTEIN TOTAL   8.3             RBC   5.53             RDW   14.0             Sodium   131             WBC   22.43                                    Significant Imaging:         Assessment/Plan:  Pod#2 s/p lap appendectomy for gangrenous appendecitis  1.patient not tolerating po suspect ileus at this point but he is passing flatus.  Needs to be back on ivf  2.persistently elevated blood glucose as well as worsening acidosis-dr. Prince has put In transfer request to facility with icu for management of DKA  3.from surgical standpoint he needs to be on clears only and continue abx for now, if leukocytosis fails to improve he will need ct of abdomen in 24-48 hours to assess for developing intraabdominal abscess.  4. Discussed with family at the bedside and questions answered  5.informed house supervisor that I am on call at [facility] as well as [facility] so I will be able to see him to continue care at either of those facilities if patient is accepted by medicine/icu at either of those facilities                    Jamshid Bailey MD  Surgery  [place] - [facility] Surg ([facility])  06/20/2025 7:10 AM

## 2025-06-20 NOTE — ASSESSMENT & PLAN NOTE
NPO   IV fluids   Pain control  IV Zosyn  General surgery consulted     6/19 S/p appendectomy.  Continue leukocytosis.  Will continue IV zosyn and repeat CBC tomorrow     6/20 continued leukocytosis

## 2025-06-20 NOTE — ASSESSMENT & PLAN NOTE
Patient's blood pressure range in the last 24 hours was: BP  Min: 117/60  Max: 175/79.The patient's inpatient anti-hypertensive regimen is listed below:  Current Antihypertensives     Blood pressure is well controlled he is NPO we will hold meds for now    Resume home medications 6/19

## 2025-06-20 NOTE — ASSESSMENT & PLAN NOTE
White count initially elevated due to acute appendicitis  Trending up after appendectomy  Repeat CBC.  Would like to make sure improving before discharge.  Continue IV zosyn and general surgery recommending discharge with po abx.      Continues to be elevated

## 2025-06-20 NOTE — PROGRESS NOTES
Pharmacist Renal Dose Adjustment Note    Ross Crystal is a 60 y.o. male being treated with the medication famotidine    Patient Data:    Vital Signs (Most Recent):  Temp: 97.9 °F (36.6 °C) (06/20/25 0412)  Pulse: 110 (06/20/25 0412)  Resp: 20 (06/20/25 0412)  BP: (!) 150/70 (06/20/25 0412)  SpO2: 95 % (06/20/25 0412) Vital Signs (72h Range):  Temp:  [97.3 °F (36.3 °C)-100.2 °F (37.9 °C)]   Pulse:  []   Resp:  [14-22]   BP: (100-152)/(55-82)   SpO2:  [91 %-99 %]      Recent Labs   Lab 06/17/25  1337 06/18/25  0420 06/19/25  0439   CREATININE 1.0 0.8 1.2     Serum creatinine: 1.2 mg/dL 06/19/25 0439  Estimated creatinine clearance: 57.3 mL/min    Medication:famotidine dose: 20mg frequency bid will be changed to medication:famotidine dose:20mg frequency:daily    Pharmacist's Name: Torrey Mathias

## 2025-06-20 NOTE — PLAN OF CARE
Problem: Adult Inpatient Plan of Care  Goal: Plan of Care Review  Outcome: Progressing     Problem: Adult Inpatient Plan of Care  Goal: Absence of Hospital-Acquired Illness or Injury  Outcome: Progressing     Problem: Adult Inpatient Plan of Care  Goal: Optimal Comfort and Wellbeing  Outcome: Progressing     Problem: Adult Inpatient Plan of Care  Goal: Readiness for Transition of Care  Outcome: Progressing     Problem: Fall Injury Risk  Goal: Absence of Fall and Fall-Related Injury  Outcome: Progressing     Problem: Wound  Goal: Optimal Coping  Outcome: Progressing     Problem: Wound  Goal: Optimal Pain Control and Function  Outcome: Progressing     Problem: Wound  Goal: Optimal Wound Healing  Outcome: Progressing

## 2025-06-20 NOTE — SUBJECTIVE & OBJECTIVE
Past Medical History:   Diagnosis Date    Benign paroxysmal vertigo, bilateral     Diabetes mellitus     Diabetes mellitus type I     Hyperlipemia     Renal disorder     Urinary tract infection        Past Surgical History:   Procedure Laterality Date    CARPAL TUNNEL RELEASE  25 YEARS AGO    COLONOSCOPY      COLONOSCOPY N/A 1/25/2016    Procedure: COLONOSCOPY;  Surgeon: Luis Bogran-Reyes, MD;  Location: [facility];  Service: Endoscopy;  Laterality: N/A;    DEBRIDEMENT, PHALANX, HAND Left 12/29/2022    Procedure: DEBRIDEMENT, PHALANX, HAND;  Surgeon: Abdon Reid Jr., MD;  Location: [facility];  Service: Orthopedics;  Laterality: Left;    DEBRIDEMENT, PHALANX, HAND Left 2/20/2024    Procedure: DEBRIDEMENT, PHALANX, HAND;  Surgeon: Abdon Reid Jr., MD;  Location: [facility];  Service: Orthopedics;  Laterality: Left;    ESOPHAGOGASTRODUODENOSCOPY      HERNIA REPAIR      HERNIA REPAIR      LAPAROSCOPIC APPENDECTOMY N/A 6/18/2025    Procedure: APPENDECTOMY, LAPAROSCOPIC;  Surgeon: Jamshid Bailey MD;  Location: [facility];  Service: General;  Laterality: N/A;       Review of patient's allergies indicates:   Allergen Reactions    Sulfamethoxazole      Other Reaction(s): hives rash    Trimethoprim      Other Reaction(s): hives rash    Bactrim [sulfamethoxazole-trimethoprim] Rash       No current facility-administered medications on file prior to encounter.     Current Outpatient Medications on File Prior to Encounter   Medication Sig    aspirin (ECOTRIN) 81 MG EC tablet Take 81 mg by mouth once daily.    atorvastatin (LIPITOR) 80 MG tablet Take 80 mg by mouth.    cyclobenzaprine (FLEXERIL) 10 MG tablet Take 10 mg by mouth.    empagliflozin (JARDIANCE) 25 mg tablet Take 1 tablet (25 mg total) by mouth once daily.    finasteride (PROSCAR) 5 mg tablet Take 1 tablet (5 mg total) by mouth once daily.    fish oil-omega-3 fatty acids 300-1,000 mg capsule Take 1 capsule by mouth 2 (two) times a day.    HYDROcodone-acetaminophen  "(NORCO) 5-325 mg per tablet Take 1 tablet by mouth every 4 (four) hours as needed for Pain.    insulin aspart U-100 (NOVOLOG FLEXPEN U-100 INSULIN) 100 unit/mL (3 mL) InPn pen Inject 7 Units into the skin 3 (three) times daily with meals.    insulin glargine U-100, Lantus, 100 unit/mL (3 mL) SubQ InPn pen Inject 14 Units into the skin once daily.    tamsulosin (FLOMAX) 0.4 mg Cap Take 1 capsule (0.4 mg total) by mouth 2 (two) times a day.    BAQSIMI 3 mg/actuation [place] 3 mg by Nasal route.    BD ULTRA-FINE JENNY PEN NEEDLE 32 gauge x 5/32" Ndle 4 (four) times daily.    BD ULTRA-FINE JENNY PEN NEEDLE 32 gauge x 5/32" Ndle To inject insulin 4 times daily    blood sugar diagnostic (TRUE METRIX GLUCOSE TEST STRIP) Strp Check BG three times daily    blood-glucose sensor (FREESTYLE JULIO 3 PLUS SENSOR) Sadaf To use for glucose monitoring every 15 days.    flash glucose scanning reader (FREESTYLE JULIO 2 READER) Misc Use as instructed for blood glucose monitoring.    flash glucose sensor (FREESTYLE JULIO 14 DAY SENSOR) Kit Inject 1 each into the skin.    FREESTYLE JULIO 2 SENSOR Kit CHANGE EVERY 14 DAYS FOR BLOOD GLUCOSE MONITORING    FREESTYLE JULIO 3 READER Misc 1 Device by Misc.(Non-Drug; Combo Route) route once. for 1 dose    insulin syringe-needle U-100 1 mL 31 gauge x 5/16 Syrg     meclizine (ANTIVERT) 50 MG tablet Take 50 mg by mouth 2 (two) times daily.    meloxicam (MOBIC) 15 MG tablet Take 15 mg by mouth.    pravastatin (PRAVACHOL) 40 MG tablet Take 40 mg by mouth every evening.    TRUE METRIX GLUCOSE TEST STRIP Strp 1 strip 4 (four) times daily.    TRUEPLUS LANCETS 33 gauge Misc USE TO TEST BLOOD SUGAR TWO TIMES DAILY     Family History       Problem Relation (Age of Onset)    Alzheimer's disease Mother    Cancer Father          Tobacco Use    Smoking status: Every Day     Current packs/day: 2.50     Average packs/day: 2.5 packs/day for 36.5 years (91.2 ttl pk-yrs)     Types: Cigarettes     Start date: 1989    " Smokeless tobacco: Never    Tobacco comments:     Tobacco cessation   Substance and Sexual Activity    Alcohol use: No     Alcohol/week: 0.0 standard drinks of alcohol    Drug use: No     Comment: 30YEARS AGO, PATIENT ON PRESCRIBED PAIN MEDICATION.     Sexual activity: Not Currently     Review of Systems   Constitutional:  Negative for chills and fever.   HENT:  Negative for congestion, postnasal drip and sore throat.    Eyes:  Negative for photophobia.   Respiratory:  Negative for chest tightness and shortness of breath.    Cardiovascular:  Negative for chest pain.   Gastrointestinal:  Negative for abdominal distention, abdominal pain, blood in stool, nausea and vomiting.   Genitourinary:  Negative for dysuria, flank pain and hematuria.   Musculoskeletal:  Negative for back pain.   Skin:  Negative for pallor.   Neurological:  Negative for dizziness, seizures, facial asymmetry, speech difficulty and numbness.   Hematological:  Does not bruise/bleed easily.   Psychiatric/Behavioral:  Negative for agitation and suicidal ideas. The patient is not nervous/anxious.      Objective:     Vital Signs (Most Recent):  Temp: 97.7 °F (36.5 °C) (06/20/25 0804)  Pulse: 90 (06/20/25 1232)  Resp: 20 (06/20/25 1228)  BP: 120/60 (06/20/25 1232)  SpO2: 100 % (06/20/25 1232) Vital Signs (24h Range):  Temp:  [97.3 °F (36.3 °C)-97.9 °F (36.6 °C)] 97.7 °F (36.5 °C)  Pulse:  [] 90  Resp:  [16-22] 20  SpO2:  [94 %-100 %] 100 %  BP: (117-175)/(60-80) 120/60     Weight: 71.3 kg (157 lb 3 oz)  Body mass index is 27.84 kg/m².     Physical Exam  Vitals and nursing note reviewed.   Constitutional:       Appearance: He is well-developed.   HENT:      Head: Normocephalic and atraumatic.      Nose: Nose normal.   Eyes:      Conjunctiva/sclera: Conjunctivae normal.      Pupils: Pupils are equal, round, and reactive to light.   Neck:      Thyroid: No thyromegaly.      Vascular: No JVD.   Cardiovascular:      Rate and Rhythm: Normal rate and  regular rhythm.      Heart sounds: Normal heart sounds.   Pulmonary:      Effort: Pulmonary effort is normal.      Breath sounds: Normal breath sounds.   Abdominal:      General: Bowel sounds are normal. There is no distension.      Palpations: Abdomen is soft. There is no mass.      Tenderness: There is no abdominal tenderness. There is no guarding.       Musculoskeletal:         General: Normal range of motion.      Cervical back: Normal range of motion and neck supple.   Lymphadenopathy:      Cervical: No cervical adenopathy.   Skin:     General: Skin is warm and dry.      Coloration: Skin is not pale.      Findings: No rash.   Neurological:      Mental Status: He is alert and oriented to person, place, and time.      Cranial Nerves: No cranial nerve deficit.      Deep Tendon Reflexes: Reflexes are normal and symmetric.              CRANIAL NERVES     CN III, IV, VI   Pupils are equal, round, and reactive to light.       Significant Labs: All pertinent labs within the past 24 hours have been reviewed.  A1C:   Recent Labs   Lab 02/11/25  1830 06/17/25  1804   HGBA1C 7.5* 7.3*     CBC:   Recent Labs   Lab 06/19/25  0439 06/20/25  0416   WBC 25.60* 22.43*   HGB 16.5 15.9   HCT 50.5 51.0    260     CMP:   Recent Labs   Lab 06/19/25  0439 06/20/25  0416   * 131*   K 5.2* 5.7*    102   CO2 15* 6*   * 314*   BUN 24* 37*   CREATININE 1.2 1.6*   CALCIUM 9.9 10.6*   PROT 8.3 8.3   ALBUMIN 3.6 3.5   BILITOT 1.0 0.6   ALKPHOS 98 132   AST 15 17   ALT 20 19   ANIONGAP 16 23*     Troponin:   Recent Labs   Lab 06/20/25  0902   TROPONINI <0.006       Urine Studies:   Recent Labs   Lab 06/20/25  0902   COLORU Yellow   APPEARANCEUA Clear   PHUR 5.0   SPECGRAV 1.015   PROTEINUA 1+*   GLUCUA 2+*   BILIRUBINUA Negative   OCCULTUA Trace*   NITRITE Negative   UROBILINOGEN Negative   LEUKOCYTESUR Negative   RBCUA 6*   WBCUA 0   BACTERIA Rare   HYALINECASTS 0         Significant Imaging: I have reviewed all  pertinent imaging results/findings within the past 24 hours.  CT: I have reviewed all pertinent results/findings within the past 24 hours and my personal findings are:  The appendix is dilated and fluid-filled, and there is surrounding inflammatory fat stranding.  Findings are consistent with acute appendicitis.  There is no abscess or perforation.  The appendix is noted to be retrocecal in location, located in the right mid abdomen.  No inflammation is seen elsewhere in the bowel.  There are few distal colonic diverticula.  There is no free intraperitoneal fluid or air.

## 2025-06-20 NOTE — ASSESSMENT & PLAN NOTE
Patient's FSGs are uncontrolled due to hyperglycemia on current medication regimen.  Last A1c reviewed-   Lab Results   Component Value Date    HGBA1C 7.3 (H) 06/17/2025     Most recent fingerstick glucose reviewed-   Recent Labs   Lab 06/20/25  1023 06/20/25  1130 06/20/25  1230 06/20/25  1331   POCTGLUCOSE 349* 288* 283* 226*     Current correctional scale  Low  Maintain anti-hyperglycemic dose as follows-   Antihyperglycemics (From admission, onward)      Start     Stop Route Frequency Ordered    06/20/25 0915  insulin regular in 0.9 % NaCl 100 unit/100 mL (1 unit/mL) infusion        Question Answer Comment   Insulin Rate Adjustment (DO NOT MODIFY ANSWER) [email or web address] ADJUSTMENT DKA version [email or web address]    Initial dose (DO NOT CHANGE): 0.1 units/kg/hr        -- IV Continuous 06/20/25 0903    06/18/25 2111  insulin aspart U-100 pen 0-5 Units         -- SubQ Before meals & nightly PRN 06/18/25 2011          Hold Oral hypoglycemics while patient is in the hospital.

## 2025-06-21 PROBLEM — N17.9 AKI (ACUTE KIDNEY INJURY): Status: ACTIVE | Noted: 2025-06-21

## 2025-06-24 LAB
ESTROGEN SERPL-MCNC: NORMAL PG/ML
INSULIN SERPL-ACNC: NORMAL U[IU]/ML
LAB AP CLINICAL INFORMATION: NORMAL
LAB AP GROSS DESCRIPTION: NORMAL
LAB AP PERFORMING LOCATION(S): NORMAL
LAB AP REPORT FOOTNOTES: NORMAL

## 2025-06-25 LAB — BACTERIA BLD CULT: NORMAL

## 2025-06-25 NOTE — ASSESSMENT & PLAN NOTE
"Patient's FSGs are controlled on current medication regimen.  Last A1c reviewed-   Lab Results   Component Value Date    HGBA1C 7.3 (H) 06/17/2025     Most recent fingerstick glucose reviewed-   No results for input(s): "POCTGLUCOSE" in the last 24 hours.    Current correctional scale  Low  Maintain anti-hyperglycemic dose as follows-   Antihyperglycemics (From admission, onward)      None          Hold Oral hypoglycemics while patient is in the hospital.    6/20:Beta hydroxybutyrate elevated, 4+ ketones, anion gap metabolic acidosis.  Bicarb ordered.  Insulin gtt started and transfer to higher level of care.    "

## 2025-06-25 NOTE — ASSESSMENT & PLAN NOTE
"Patient's FSGs are uncontrolled due to hyperglycemia on current medication regimen.  Last A1c reviewed-   Lab Results   Component Value Date    HGBA1C 7.3 (H) 06/17/2025     Most recent fingerstick glucose reviewed-   No results for input(s): "POCTGLUCOSE" in the last 24 hours.    Current correctional scale  Low  Maintain anti-hyperglycemic dose as follows-   Antihyperglycemics (From admission, onward)      None          Hold Oral hypoglycemics while patient is in the hospital.  "

## 2025-06-25 NOTE — ASSESSMENT & PLAN NOTE
Patient's blood pressure range in the last 24 hours was: No data recorded.The patient's inpatient anti-hypertensive regimen is listed below:  Current Antihypertensives     Blood pressure is well controlled he is NPO we will hold meds for now    Resume home medications 6/19

## 2025-06-25 NOTE — DISCHARGE SUMMARY
[facility] Medicine  Discharge Summary      Patient Name: Ross Crystal  MRN: [id number]  FRED: [id number]  Patient Class: IP- Inpatient  Admission Date: 6/17/2025  Hospital Length of Stay: 2 days  Discharge Date and Time: 6/20/2025  5:49 PM  Attending Physician: No att. providers found   Discharging Provider: Bhupinder Erwin MD  Primary Care Provider: Ross Valverde MD    Primary Care Team: Networked reference to record PCT     HPI:   Ross Crystal is a 60 y.o. male  with a past medical history of T2DM, HTN, HLD, Neuropathy here for for acute appendicitis.  He reports right lower quadrant pain for 2 days.  The pain has been getting worse, 8/10 in intensity, worse with movement and coughing.  He denies vomiting.  Some nausea is reported.  Workup in the emergency room showed leukocytosis with white count more than 21,000.  CT scan showed The appendix is dilated and fluid-filled, and there is surrounding inflammatory fat stranding.  Findings are consistent with acute appendicitis.  There is no abscess or perforation.  The appendix is noted to be retrocecal in location, located in the right mid abdomen.  No inflammation is seen elsewhere in the bowel.  There are few distal colonic diverticula.  There is no free intraperitoneal fluid or air.   Acute appendicitis without abscess or perforation.    I saw him on 3rd floor where he has been admitted to hospital service after surgical consultation.  He has been placed on OR list for tomorrow morning.  He will be NPO.  Pain control.  IV Zosyn.    Procedure(s) (LRB):  APPENDECTOMY, LAPAROSCOPIC (N/A)      Hospital Course:   Admitted for appendicitis.  IV fluids, IV zosyn and IV pain control.  General surgery consulted for appendectomy.      6/19 S/p appendectomy.  Unable to discharge home due to up trending leukocytosis.  Continue IV zosyn and repeat CBC tomorrow.      6/20 Pt HD stable on room air.  Patient has had a persistent leukocytosis and  "developed anion gap overnight.  Metabolic acidosis seen on abg.  4+ ketones and elevated betahydroxybutyrate. Bicarb ordered and insulin gtt started for DKA.      Goals of Care Treatment Preferences:  Code Status: Full Code         Consults:   Consults (From admission, onward)          Status Ordering Provider     Inpatient consult to General Surgery  Once        Provider:  Jamshid Bailey MD    Completed LOS ANDRES            Assessment & Plan  Essential hypertension  Patient's blood pressure range in the last 24 hours was: No data recorded.The patient's inpatient anti-hypertensive regimen is listed below:  Current Antihypertensives     Blood pressure is well controlled he is NPO we will hold meds for now    Resume home medications 6/19  Pure hypercholesterolemia  He is on statin.  Hold for now    Resume home statin 6/19    Type 2 diabetes mellitus, with long-term current use of insulin  Patient's FSGs are uncontrolled due to hyperglycemia on current medication regimen.  Last A1c reviewed-   Lab Results   Component Value Date    HGBA1C 7.3 (H) 06/17/2025     Most recent fingerstick glucose reviewed-   No results for input(s): "POCTGLUCOSE" in the last 24 hours.    Current correctional scale  Low  Maintain anti-hyperglycemic dose as follows-   Antihyperglycemics (From admission, onward)      None          Hold Oral hypoglycemics while patient is in the hospital.  BPH (benign prostatic hyperplasia)  Resume home flomax and finasteride    Acute appendicitis with localized peritonitis, without perforation, abscess, or gangrene  NPO   IV fluids   Pain control  IV Zosyn  General surgery consulted     6/19 S/p appendectomy.  Continue leukocytosis.  Will continue IV zosyn and repeat CBC tomorrow     6/20 continued leukocytosis     Leukocytosis  White count initially elevated due to acute appendicitis  Trending up after appendectomy  Repeat CBC.  Would like to make sure improving before discharge.  Continue IV zosyn " "and general surgery recommending discharge with po abx.      Continues to be elevated   Diabetic ketoacidosis without coma associated with type 2 diabetes mellitus  Patient's FSGs are controlled on current medication regimen.  Last A1c reviewed-   Lab Results   Component Value Date    HGBA1C 7.3 (H) 06/17/2025     Most recent fingerstick glucose reviewed-   No results for input(s): "POCTGLUCOSE" in the last 24 hours.    Current correctional scale  Low  Maintain anti-hyperglycemic dose as follows-   Antihyperglycemics (From admission, onward)      None          Hold Oral hypoglycemics while patient is in the hospital.    6/20:Beta hydroxybutyrate elevated, 4+ ketones, anion gap metabolic acidosis.  Bicarb ordered.  Insulin gtt started and transfer to higher level of care.    Final Active Diagnoses:    Diagnosis Date Noted POA    PRINCIPAL PROBLEM:  Diabetic ketoacidosis without coma associated with type 2 diabetes mellitus [E11.10] 06/20/2025 Yes    Acute appendicitis with localized peritonitis, without perforation, abscess, or gangrene [K35.30] 06/17/2025 Yes    BPH (benign prostatic hyperplasia) [N40.0] 02/12/2025 Yes    Leukocytosis [D72.829] 12/28/2022 Yes    Essential hypertension [I10] 01/06/2022 Yes    Pure hypercholesterolemia [E78.00] 01/06/2022 Yes    Type 2 diabetes mellitus, with long-term current use of insulin [E11.9, Z79.4] 01/06/2022 Yes      Problems Resolved During this Admission:       Discharged Condition: poor    Disposition: Home or Self Care    Follow Up:   Follow-up Information       Ross Valverde MD Follow up.    Specialty: Family Medicine  Contact information:  [address] [facility] [address] [phone]                           Patient Instructions:   No discharge procedures on file.    Significant Diagnostic Studies: N/A    Pending Diagnostic Studies:       None           Medications:  Transfer Medications (for Discharge Readmit only): Current " "Medications[1]    Indwelling Lines/Drains at time of discharge:   Lines/Drains/Airways       None                       Time spent on the discharge of patient: 20 minutes         Bhupinder Erwin MD  Department of Hospital Medicine  [facility] - Emergency Dept       [1]   No current facility-administered medications for this encounter.     Current Outpatient Medications   Medication Sig Dispense Refill    aspirin (ECOTRIN) 81 MG EC tablet Take 81 mg by mouth once daily.      atorvastatin (LIPITOR) 80 MG tablet Take 80 mg by mouth.      cyclobenzaprine (FLEXERIL) 10 MG tablet Take 10 mg by mouth.      empagliflozin (JARDIANCE) 25 mg tablet Take 1 tablet (25 mg total) by mouth once daily. 30 tablet 11    finasteride (PROSCAR) 5 mg tablet Take 1 tablet (5 mg total) by mouth once daily. 30 tablet 11    fish oil-omega-3 fatty acids 300-1,000 mg capsule Take 1 capsule by mouth 2 (two) times a day.      HYDROcodone-acetaminophen (NORCO) 5-325 mg per tablet Take 1 tablet by mouth every 4 (four) hours as needed for Pain. 20 tablet 0    insulin aspart U-100 (NOVOLOG FLEXPEN U-100 INSULIN) 100 unit/mL (3 mL) InPn pen Inject 7 Units into the skin 3 (three) times daily with meals. 9 mL 11    insulin glargine U-100, Lantus, 100 unit/mL (3 mL) SubQ InPn pen Inject 14 Units into the skin once daily. 6 mL 11    tamsulosin (FLOMAX) 0.4 mg Cap Take 1 capsule (0.4 mg total) by mouth 2 (two) times a day. 60 capsule 11    BAQSIMI 3 mg/actuation [place] 3 mg by Nasal route.      BD ULTRA-FINE JENNY PEN NEEDLE 32 gauge x 5/32" Ndle 4 (four) times daily.      BD ULTRA-FINE JENNY PEN NEEDLE 32 gauge x 5/32" Ndle To inject insulin 4 times daily 100 each 11    blood sugar diagnostic (TRUE METRIX GLUCOSE TEST STRIP) Strp Check BG three times daily 100 strip 11    blood-glucose sensor (FREESTYLE JULIO 3 PLUS SENSOR) Sadaf To use for glucose monitoring every 15 days. 2 each 11    flash glucose scanning reader (FREESTYLE JULIO 2 READER) [facility] Use as " instructed for blood glucose monitoring.      flash glucose sensor (FREESTYLE JULIO 14 DAY SENSOR) Kit Inject 1 each into the skin.      FREESTYLE JULIO 2 SENSOR Kit CHANGE EVERY 14 DAYS FOR BLOOD GLUCOSE MONITORING      FREESTYLE JULIO 3 READER Misc 1 Device by Misc.(Non-Drug; Combo Route) route once. for 1 dose 1 each 0    insulin syringe-needle U-100 1 mL 31 gauge x 5/16 Syrg       meclizine (ANTIVERT) 50 MG tablet Take 50 mg by mouth 2 (two) times daily.      meloxicam (MOBIC) 15 MG tablet Take 15 mg by mouth.      nicotine, polacrilex, (NICORETTE) 2 mg Gum Take 1 each (2 mg total) by mouth every hour as needed (smoking cessation). 90 each 3    pravastatin (PRAVACHOL) 40 MG tablet Take 40 mg by mouth every evening.      TRUE METRIX GLUCOSE TEST STRIP Strp 1 strip 4 (four) times daily.      TRUEPLUS LANCETS 33 gauge Misc USE TO TEST BLOOD SUGAR TWO TIMES DAILY

## 2025-06-27 NOTE — PLAN OF CARE
[facility] - Emergency Dept  Discharge Final Note    Primary Care Provider: Ross Valverde MD    Expected Discharge Date: 6/20/2025    Final Discharge Note (most recent)       Final Note - 06/27/25 1647          Final Note    Assessment Type Final Discharge Note (P)      Anticipated Discharge Disposition Short Term Hospital (P)                      Important Message from Medicare             Contact Info       Ross Valverde MD   Specialty: Family Medicine   Relationship: PCP - General    [address] [facility] [address]   Phone: [phone]       Next Steps: Follow up          Patient transferred to another facility for higher level of care.

## (undated) DEVICE — STOCKINET TUBULAR 1 PLY 6X60IN

## (undated) DEVICE — BANDAGE ESMARK ELASTIC ST 4X9

## (undated) DEVICE — SYR B-D DISP CONTROL 10CC100/C

## (undated) DEVICE — MANIFOLD 4 PORT

## (undated) DEVICE — GOWN POLY REINF BRTH SLV LG

## (undated) DEVICE — TROCAR KII FIOS ZTHREAD 12X100

## (undated) DEVICE — GAUZE SPONGE 4X4 12PLY

## (undated) DEVICE — KIT COLLECTION E SWAB REGULAR

## (undated) DEVICE — SEE MEDLINE ITEM 152186

## (undated) DEVICE — SEE L#120831

## (undated) DEVICE — ELECTRODE ELECSURG L-HK 32CM

## (undated) DEVICE — PACK LOWER EXTREMITY

## (undated) DEVICE — BLADE SURG #15 CARBON STEEL

## (undated) DEVICE — SUT ETHILON 5-0 PS-2 18IN

## (undated) DEVICE — NDL 18GA

## (undated) DEVICE — BLADE SCALP OPHTL BEVEL STR

## (undated) DEVICE — BANDAGE MATRIX HK LOOP 2IN 5YD

## (undated) DEVICE — DRESSING XEROFORM FOIL PK 1X8

## (undated) DEVICE — CART STAPLE FLEX ETX 3.5MM BLU

## (undated) DEVICE — PACKING STRIP IDOFORM 1X5YD

## (undated) DEVICE — TROCAR ENDO Z THREAD KII 5X100

## (undated) DEVICE — STAPLER INT LINEAR ARTC 3.5-45

## (undated) DEVICE — BANDAGE MATRIX HK LOOP 3IN 5YD

## (undated) DEVICE — CART STAPLE RELD 45MM WHT

## (undated) DEVICE — COVER OVERHEAD SURG LT BLUE

## (undated) DEVICE — PAD CAST SPECIALIST STRL 3

## (undated) DEVICE — GAUZE AVANT SPNG 4PLY STRL 4X4

## (undated) DEVICE — BANDAGE ESMARK 3INX9YD

## (undated) DEVICE — DRAPE HAND STERILE

## (undated) DEVICE — PAD CAST 2 IN X 4YDS STERILE

## (undated) DEVICE — SWAB BBL CULTURETTE

## (undated) DEVICE — Device

## (undated) DEVICE — PAD PREP CUFFED NS 24X48IN

## (undated) DEVICE — TOURNIQUET SB QC SP 18X4IN

## (undated) DEVICE — GLOVE SURG BIOGEL LATEX SZ 7.5

## (undated) DEVICE — BAG TISS RETRV MONARCH 10MM

## (undated) DEVICE — PACK SURGERY START

## (undated) DEVICE — KIT ANTIFOG

## (undated) DEVICE — PACK BASIC

## (undated) DEVICE — HANDLE CTRL PSTL GRIP E/S S/I

## (undated) DEVICE — SOL SALINE STER BOTTLE 500ML

## (undated) DEVICE — DISSECTOR EPIX LAPA 5MMX35CM

## (undated) DEVICE — DRESSING XEROFORM NONADH 1X8IN

## (undated) DEVICE — NDL HYPO REG 25G X 1 1/2

## (undated) DEVICE — GOWN POLY REINF BRTH SLV XL

## (undated) DEVICE — SEE MEDLINE ITEM 154981

## (undated) DEVICE — APPLICATOR CHLORAPREP ORN 26ML

## (undated) DEVICE — ELECTRODE REM POLYHESIVE II

## (undated) DEVICE — CANNULA LAP SEAL Z THRD 5X100